# Patient Record
Sex: MALE | Race: WHITE | NOT HISPANIC OR LATINO | Employment: OTHER | URBAN - METROPOLITAN AREA
[De-identification: names, ages, dates, MRNs, and addresses within clinical notes are randomized per-mention and may not be internally consistent; named-entity substitution may affect disease eponyms.]

---

## 2016-12-28 LAB — CRC RECOMMENDATION EXT: NORMAL

## 2019-07-15 ENCOUNTER — HISTORICAL (OUTPATIENT)
Dept: RADIOLOGY | Facility: HOSPITAL | Age: 64
End: 2019-07-15

## 2019-07-19 ENCOUNTER — HISTORICAL (OUTPATIENT)
Dept: RADIOLOGY | Facility: HOSPITAL | Age: 64
End: 2019-07-19

## 2019-08-26 ENCOUNTER — HISTORICAL (OUTPATIENT)
Dept: RADIOLOGY | Facility: HOSPITAL | Age: 64
End: 2019-08-26

## 2020-02-12 LAB
ALBUMIN SERPL-MCNC: 3.9 GM/DL (ref 3.4–5)
ALBUMIN/GLOB SERPL: 1.3 {RATIO}
ALP SERPL-CCNC: 86 UNIT/L (ref 50–136)
ALT SERPL-CCNC: 66 UNIT/L (ref 12–78)
APPEARANCE, UA: CLEAR
APTT PPP: 29.6 SECOND(S) (ref 24.2–33.9)
AST SERPL-CCNC: 29 UNIT/L (ref 15–37)
BACTERIA #/AREA URNS AUTO: NORMAL /HPF
BILIRUB SERPL-MCNC: 0.8 MG/DL (ref 0.2–1)
BILIRUB UR QL STRIP: NEGATIVE
BILIRUBIN DIRECT+TOT PNL SERPL-MCNC: 0.1 MG/DL (ref 0–0.2)
BILIRUBIN DIRECT+TOT PNL SERPL-MCNC: 0.7 MG/DL (ref 0–0.8)
BUN SERPL-MCNC: 16 MG/DL (ref 7–18)
CALCIUM SERPL-MCNC: 8.8 MG/DL (ref 8.5–10.1)
CHLORIDE SERPL-SCNC: 104 MMOL/L (ref 98–107)
CO2 SERPL-SCNC: 28 MMOL/L (ref 21–32)
COLOR UR: YELLOW
CREAT SERPL-MCNC: 0.78 MG/DL (ref 0.7–1.3)
CRP SERPL HS-MCNC: 1.64 MG/L (ref 0–3)
ERYTHROCYTE [DISTWIDTH] IN BLOOD BY AUTOMATED COUNT: 12.7 % (ref 11.5–17)
ERYTHROCYTE [SEDIMENTATION RATE] IN BLOOD: 7 MM/HR (ref 0–15)
GLOBULIN SER-MCNC: 3.1 GM/DL (ref 2.4–3.5)
GLUCOSE (UA): NEGATIVE
GLUCOSE SERPL-MCNC: 110 MG/DL (ref 74–106)
HCT VFR BLD AUTO: 43.6 % (ref 42–52)
HGB BLD-MCNC: 14.3 GM/DL (ref 14–18)
HGB UR QL STRIP: NEGATIVE
INR PPP: 1 (ref 0–1.3)
KETONES UR QL STRIP: NEGATIVE
LEUKOCYTE ESTERASE UR QL STRIP: NEGATIVE
MCH RBC QN AUTO: 32.1 PG (ref 27–31)
MCHC RBC AUTO-ENTMCNC: 32.8 GM/DL (ref 33–36)
MCV RBC AUTO: 97.8 FL (ref 80–94)
NITRITE UR QL STRIP.AUTO: NEGATIVE
PH UR STRIP: 5 [PH] (ref 5–9)
PLATELET # BLD AUTO: 252 X10(3)/MCL (ref 130–400)
PMV BLD AUTO: 9.9 FL (ref 9.4–12.4)
POTASSIUM SERPL-SCNC: 4.9 MMOL/L (ref 3.5–5.1)
PREALB SERPL-MCNC: 37.4 MG/DL (ref 18–38)
PROT SERPL-MCNC: 7 GM/DL (ref 6.4–8.2)
PROT UR QL STRIP: NEGATIVE
PROTHROMBIN TIME: 12.2 SECOND(S) (ref 12–14)
RBC # BLD AUTO: 4.46 X10(6)/MCL (ref 4.7–6.1)
RBC #/AREA URNS HPF: NORMAL /[HPF]
SODIUM SERPL-SCNC: 137 MMOL/L (ref 136–145)
SP GR UR STRIP: 1.01 (ref 1–1.03)
SQUAMOUS EPITHELIAL, UA: NORMAL
TRANSFERRIN SERPL-MCNC: 279 MG/DL (ref 200–360)
UROBILINOGEN UR STRIP-ACNC: 0.2
WBC # SPEC AUTO: 7.2 X10(3)/MCL (ref 4.5–11.5)
WBC #/AREA URNS AUTO: NORMAL /HPF (ref 0–3)

## 2020-02-20 ENCOUNTER — HISTORICAL (OUTPATIENT)
Dept: ADMINISTRATIVE | Facility: HOSPITAL | Age: 65
End: 2020-02-20

## 2020-03-17 ENCOUNTER — HISTORICAL (OUTPATIENT)
Dept: RADIOLOGY | Facility: HOSPITAL | Age: 65
End: 2020-03-17

## 2020-05-22 ENCOUNTER — HISTORICAL (OUTPATIENT)
Dept: RADIOLOGY | Facility: HOSPITAL | Age: 65
End: 2020-05-22

## 2020-07-28 ENCOUNTER — HISTORICAL (OUTPATIENT)
Dept: ADMINISTRATIVE | Facility: HOSPITAL | Age: 65
End: 2020-07-28

## 2020-07-28 LAB
ABS NEUT (OLG): 4.03 X10(3)/MCL (ref 2.1–9.2)
ALBUMIN SERPL-MCNC: 4 GM/DL (ref 3.4–4.8)
ALBUMIN/GLOB SERPL: 1.3 RATIO (ref 1.1–2)
ALP SERPL-CCNC: 93 UNIT/L (ref 40–150)
ALT SERPL-CCNC: 40 UNIT/L (ref 0–55)
APPEARANCE, UA: CLEAR
AST SERPL-CCNC: 24 UNIT/L (ref 5–34)
BILIRUB SERPL-MCNC: 0.6 MG/DL (ref 0.2–1.2)
BILIRUB UR QL STRIP: NEGATIVE
BILIRUBIN DIRECT+TOT PNL SERPL-MCNC: 0.2 MG/DL (ref 0–0.5)
BILIRUBIN DIRECT+TOT PNL SERPL-MCNC: 0.4 MG/DL (ref 0–0.8)
BUN SERPL-MCNC: 15 MG/DL (ref 8.4–25.7)
CALCIUM SERPL-MCNC: 9.9 MG/DL (ref 8.8–10)
CHLORIDE SERPL-SCNC: 102 MMOL/L (ref 98–107)
CO2 SERPL-SCNC: 28 MMOL/L (ref 23–31)
COLOR UR: YELLOW
CREAT SERPL-MCNC: 0.94 MG/DL (ref 0.72–1.25)
ERYTHROCYTE [DISTWIDTH] IN BLOOD BY AUTOMATED COUNT: 12.9 % (ref 11.5–17)
EST. AVERAGE GLUCOSE BLD GHB EST-MCNC: 125.5 MG/DL
GLOBULIN SER-MCNC: 3.1 GM/DL (ref 2.4–3.5)
GLUCOSE (UA): NEGATIVE
GLUCOSE SERPL-MCNC: 124 MG/DL (ref 82–115)
HBA1C MFR BLD: 6 %
HCT VFR BLD AUTO: 43.5 % (ref 42–52)
HGB BLD-MCNC: 14.7 GM/DL (ref 14–18)
HGB UR QL STRIP: NEGATIVE
KETONES UR QL STRIP: NEGATIVE
LEUKOCYTE ESTERASE UR QL STRIP: NEGATIVE
MCH RBC QN AUTO: 32.5 PG (ref 27–31)
MCHC RBC AUTO-ENTMCNC: 33.8 GM/DL (ref 33–36)
MCV RBC AUTO: 96.2 FL (ref 80–94)
MRSA SCREEN BY PCR: NEGATIVE
NITRITE UR QL STRIP: NEGATIVE
NRBC BLD AUTO-RTO: 0 % (ref 0–0.2)
PH UR STRIP: 5 [PH] (ref 5–7)
PLATELET # BLD AUTO: 264 X10(3)/MCL (ref 130–400)
PMV BLD AUTO: 9.6 FL (ref 7.4–10.4)
POTASSIUM SERPL-SCNC: 4.3 MMOL/L (ref 3.5–5.1)
PROT SERPL-MCNC: 7.1 GM/DL (ref 5.8–7.6)
PROT UR QL STRIP: NEGATIVE
RBC # BLD AUTO: 4.52 X10(6)/MCL (ref 4.7–6.1)
SODIUM SERPL-SCNC: 139 MMOL/L (ref 136–145)
SP GR UR STRIP: 1.02 (ref 1–1.03)
UROBILINOGEN UR STRIP-ACNC: NEGATIVE
WBC # SPEC AUTO: 7.4 X10(3)/MCL (ref 4.5–11.5)

## 2020-10-22 ENCOUNTER — HISTORICAL (OUTPATIENT)
Dept: ADMINISTRATIVE | Facility: HOSPITAL | Age: 65
End: 2020-10-22

## 2020-11-17 ENCOUNTER — HISTORICAL (OUTPATIENT)
Dept: ADMINISTRATIVE | Facility: HOSPITAL | Age: 65
End: 2020-11-17

## 2020-12-15 ENCOUNTER — HISTORICAL (OUTPATIENT)
Dept: ADMINISTRATIVE | Facility: HOSPITAL | Age: 65
End: 2020-12-15

## 2021-11-18 ENCOUNTER — HISTORICAL (OUTPATIENT)
Dept: ADMINISTRATIVE | Facility: HOSPITAL | Age: 66
End: 2021-11-18

## 2022-04-10 ENCOUNTER — HISTORICAL (OUTPATIENT)
Dept: ADMINISTRATIVE | Facility: HOSPITAL | Age: 67
End: 2022-04-10

## 2022-04-28 VITALS
DIASTOLIC BLOOD PRESSURE: 72 MMHG | WEIGHT: 205 LBS | BODY MASS INDEX: 27.77 KG/M2 | HEIGHT: 72 IN | SYSTOLIC BLOOD PRESSURE: 119 MMHG

## 2022-05-04 NOTE — HISTORICAL [facility]
This is a historical note converted from Leandra. Formatting and pictures may have been removed.  Please reference Leandra for original formatting and attached multimedia. Chief Complaint  2 wk post op LT STEPAN 11/2/20, gl 1/31/21  History of Present Illness  65-year-old male presents here follow-up total of the left side. ?Patient is 2 weeks out. ?Doing very well. ?Using a walker for ambulation. ?Pain is controlled. ?Happy to recovery thus far.  Review of Systems  Denies fevers, chills, chest pain, shortness of breath. Comprehensive review of systems performed and otherwise negative except as noted in HPI.  Physical Exam  Vitals & Measurements  T:?36.1? ?C (Temporal Artery)? HR:?80(Peripheral)? BP:?112/68?  HT:?182.88?cm? WT:?93.000?kg? BMI:?27.81?  General: No acute distress, alert and oriented, healthy appearing?  HEENT: Head is atraumatic, mucous membranes are moist?  Cardiovascular: Brisk capillary refill  Lungs: Breathing non-labored?  Skin: no rashes appreciated?  Neurologic: Sensation is grossly intact distally  ?  Hip exam:  Left?hip incision clean dry and intact. No erythema, drainage, or signs of infection  No swelling distally. No signs of DVT  No pain with logroll  Sensation intact distally to right foot  Positive FHL/EHL/gastrocsoleus/tib ant brisk capillary refill right foot  ?  Assessment/Plan  Aftercare following joint replacement?Z47.1  ?Patient doing excellent following hip arthroplasty. ?His pain is controlled. ?He is ambulating well.? Happy with his recovery thus far.? Plan to see him back in a month. ?Continue physical therapy for gait training.  Ordered:  Clinic Follow up, *Est. 12/15/20 3:00:00 CST, Order for future visit, Aftercare following joint replacement, [facility]  Post-Op follow-up visit [id number] PC, Aftercare following joint replacement, [facility], 11/17/20 15:48:00 CST  XR Hip Left 2 Views, Routine, *Est. 12/15/20 3:00:00 CST, Arthritis, None, Wheelchair, Patient Has IV?, Rad  Type, Order for future visit, Aftercare following joint replacement, Not Scheduled, *Est. 12/15/20 3:00:00 CST  ?  Orders:  Sugical technique, use of robotics PC, 11/02/20 15:55:00 CST, [facility], Routine, 11/02/20 15:55:00 CST, Osteoarthritis of left hip  Total hip arthroplasty [id number] PC, 11/02/20 15:55:00 CST, LT, [facility], Routine, 11/02/20 15:55:00 CST, Osteoarthritis of left hip  Referrals  Clinic Follow up, *Est. 12/15/20 3:00:00 CST, Order for future visit, Aftercare following joint replacement, [facility]   Problem List/Past Medical History  Ongoing  Back pain  Back pain  Cardiomegaly  Congestive heart failure  Constipation  Diverticulosis  Hypertension  Internal disruption of lumbar intervertebral disc  Lumbar radiculopathy  Neuropathy of upper limb  Sleep apnea  Historical  Acute peptic ulcer of stomach  Chest discomfort  Laxative abuse  Pneumonia  Procedure/Surgical History  AUGUSTO COLLADO Total Hip Arthroplasty (Left) (11/02/2020)  Replacement of Left Hip Joint with Metal on Polyethylene Synthetic Substitute, Open Approach (11/02/2020)  Excision of Lumbar Vertebral Disc, Open Approach (02/20/2020)  Laminectomy Lumbar MIS (.) (02/20/2020)  Laminotomy (hemilaminectomy), with decompression of nerve root(s), including partial facetectomy, foraminotomy and/or excision of herniated intervertebral disc; 1 interspace, lumbar (02/20/2020)  Laminotomy (hemilaminectomy), with decompression of nerve root(s), including partial facetectomy, foraminotomy and/or excision of herniated intervertebral disc; each additional interspace, cervical or lumbar (List separately in addition to code for primar (02/20/2020)  Release Lumbar Nerve, Open Approach (02/20/2020)  Cystoscopy/Retrograde Pyelogram (Left) (07/02/2019)  Drainage of Bladder, Via Natural or Artificial Opening Endoscopic (07/02/2019)  Fluoroscopy of Left Kidney, Ureter and Bladder using Low Osmolar Contrast (07/02/2019)  Colonoscopy  (12/28/2016)  Colonoscopy, flexible; diagnostic, including collection of specimen(s) by brushing or washing, when performed (separate procedure) (12/28/2016)  Inspection of Lower Intestinal Tract, Via Natural or Artificial Opening Endoscopic (12/28/2016)  Esophagogastroduodenoscopy (05/25/2016)  Esophagogastroduodenoscopy, flexible, transoral; with biopsy, single or multiple (05/24/2016)  Excision of Duodenum, Via Natural or Artificial Opening Endoscopic (05/24/2016)  Colonoscopy (2004)  Right knee surgery (1987)  Bilateral inguinal hernia surgery  Repair of facial bone   Medications  Aldactone 25 mg oral tablet, 12.5 mg= 0.5 tab(s), Oral, Daily  aspirin 81 mg oral Delayed Release (EC) tablet, 81 mg= 1 tab(s), Oral, BID  aspirin 81 mg oral tablet, CHEWABLE, 81 mg= 1 tab(s), Chewed, Daily  atorvastatin 40 mg oral tablet, 40 mg= 1 tab(s), Oral, Daily  gabapentin 100 mg oral capsule, 200 mg= 2 cap(s), Oral, BID  lisinopril 2.5 mg oral tablet, 2.5 mg= 1 tab(s), Oral, Daily  meloxicam 7.5 mg oral tablet, 7.5 mg= 1 tab(s), Oral, Daily,? ?Not taking: Last Dose Date/Time Unknown  Nitrostat 0.4 mg sublingual tab, 0.4 mg= 1 tab(s), SL, q5min, PRN  polyethylene glycol 3350 oral powder for reconstitution, Oral, Daily  Toprol-XL 25 mg oral tablet, extended release, 25 mg= 1 tab(s), Oral, Daily  Tylenol, 500 mg= 1 tab(s), Oral, q4hr  Allergies  No Known Allergies  Social History  Abuse/Neglect  No, 11/17/2020  Alcohol  Current, Daily, 11/17/2020  Employment/School  Employed, Work/School description: [employer]. Highest education level: Some college., 06/06/2016  Exercise  Home/Environment  Lives with Spouse. Alcohol abuse in household: No. Substance abuse in household: No. Smoker in household: Yes. Injuries/Abuse/Neglect in household: No. Feels unsafe at home: No. Safe place to go: Yes. Agency(s)/Others notified: No. Family/Friends available for support: Yes. Concern for family members at home: No. Major illness in household: No.  Financial concerns: No. TV/Computer concerns: No., 06/06/2016  Nutrition/Health  Regular, 06/06/2016  Sexual  Sexually active: Yes. History of sexual abuse: No., 06/06/2016  Spiritual/Cultural  Spiritism, Yes, 02/12/2020  Substance Use  Past, 11/17/2020  Tobacco  Former smoker, quit more than 30 days ago, N/A, 11/17/2020  Family History  Alzheimers disease: Father.  Diabetes mellitus type 2: Mother.  Hyperlipidemia.: Mother.  Hypertension.: Mother.  Primary malignant neoplasm of breast: Mother.  Health Maintenance  Health Maintenance  ???Pending?(in the next year)  ??? ??OverDue  ??? ? ? ?HF-ACEI/ARB Prescribed if Clinically Indicated due??12/27/17??and every 1??year(s)  ??? ? ? ?Cognitive Screening due??01/02/20??and every 1??year(s)  ??? ??Due?  ??? ? ? ?Influenza Vaccine due??10/01/20??and every 1??day(s)  ??? ? ? ?ADL Screening due??11/17/20??and every 1??year(s)  ??? ? ? ?HF-Ejection Fraction Past 13 Months if Hospitalized due??11/17/20??and every 1??year(s)  ??? ? ? ?HF-Fluid Restriction/Low Sodium Diet Education due??11/17/20??and every 1??year(s)  ??? ? ? ?HF-LVEF due??11/17/20??Unknown Frequency  ??? ? ? ?Hypertension Management-Education due??11/17/20??and every 1??year(s)  ??? ? ? ?Medicare Annual Wellness Exam due??11/17/20??and every 1??year(s)  ??? ? ? ?Pneumococcal Vaccine due??11/17/20??Unknown Frequency  ??? ? ? ?Tetanus Vaccine due??11/17/20??and every 10??year(s)  ??? ? ? ?Zoster Vaccine due??11/17/20??Unknown Frequency  ??? ??Due In Future?  ??? ? ? ?Obesity Screening not due until??01/01/21??and every 1??year(s)  ??? ? ? ?Advance Directive not due until??01/02/21??and every 1??year(s)  ??? ? ? ?Alcohol Misuse Screening not due until??01/02/21??and every 1??year(s)  ??? ? ? ?Fall Risk Assessment not due until??01/02/21??and every 1??year(s)  ??? ? ? ?Functional Assessment not due until??01/02/21??and every 1??year(s)  ??? ? ? ?Blood Pressure Screening not due until??10/22/21??and every  1??year(s)  ??? ? ? ?Body Mass Index Check not due until??10/22/21??and every 1??year(s)  ??? ? ? ?Depression Screening not due until??10/22/21??and every 1??year(s)  ??? ? ? ?Hypertension Management-Blood Pressure not due until??10/22/21??and every 1??year(s)  ??? ? ? ?Diabetes Screening not due until??11/03/21??and every 1??year(s)  ??? ? ? ?HF-Heart Failure Education not due until??11/03/21??and every 1??year(s)  ??? ? ? ?Hypertension Management-BMP not due until??11/03/21??and every 1??year(s)  ???Satisfied?(in the past 1 year)  ??? ??Satisfied?  ??? ? ? ?Advance Directive on??11/02/20.??Satisfied by Harris Toribio RN  ??? ? ? ?Alcohol Misuse Screening on??07/14/20.??Satisfied by Karey Meyers  ??? ? ? ?Aspirin Therapy for CVD Prevention on??12/17/20.??Satisfied by Bria Negro NP  ??? ? ? ?Blood Pressure Screening on??11/17/20.??Satisfied by Lulu Walker  ??? ? ? ?Body Mass Index Check on??11/17/20.??Satisfied by Lulu Walker  ??? ? ? ?Coronary Artery Disease Maintenance-Antiplatelet Agent Prescribed on??12/17/20.??Satisfied by Bria Negro NP  ??? ? ? ?Depression Screening on??11/17/20.??Satisfied by Lulu Walker  ??? ? ? ?Diabetes Screening on??11/03/20.??Satisfied by Micky Palma  ??? ? ? ?Fall Risk Assessment on??11/17/20.??Satisfied by Lulu Walker  ??? ? ? ?Functional Assessment on??11/03/20.??Satisfied by Minerva Stevens RN  ??? ? ? ?Hypertension Management-Blood Pressure on??11/17/20.??Satisfied by Lulu Walker  ??? ? ? ?Hypertension Management-BMP on??11/03/20.??Satisfied by Micky Palma  ??? ? ? ?Influenza Vaccine on??11/02/20.??Satisfied by Catarino ALSTON, Harris QUINTANA  ??? ? ? ?Obesity Screening on??11/17/20.??Satisfied by Lulu Walker  ?  Diagnostic Results  AP lateral hip reviewed. ?Patients implants appear well fixed with no signs of loosening or subsidence noted.

## 2022-05-04 NOTE — HISTORICAL [facility]
This is a historical note converted from Leandra. Formatting and pictures may have been removed.  Please reference [facility] for original formatting and attached multimedia. Chief Complaint  PRE-OP TODAY FOR LEFT STEPAN SX ON 11/2/20, HAS CARDIAC CLEARANCE.  History of Present Illness  65-year-old patient follow-up?osteoarthritis of the hip.? Patient with history of?surgery scheduled prior to this however?was canceled due to cardiac issues.? He has been cleared.? Continues to have significant complaints of pain to the left hip. ?He has tried activity modification, anti-inflammatories. ?Despite all this he continues to have significant complaints of pain in?disability. ?He would like to proceed with total hip arthroplasty in the left side.  Review of Systems  Denies fevers, chills, chest pain, shortness of breath. Comprehensive review of systems performed and otherwise negative except as noted in HPI.  Physical Exam  Vitals & Measurements  T:?97.9? ?F (Oral)? BP:?128/72?  HT:?182.86?cm? WT:?91.100?kg? BMI:?27.24?  General: No acute distress, alert and oriented, healthy appearing?  HEENT: Head is atraumatic, mucous membranes are moist  Cardiovascular: Brisk capillary refill  Lungs: Breathing non-labored  Skin: no rashes appreciated  Neurologic: Sensation is grossly intact distally  ?  Left hip:  Brisk cap refill distally. ?Sensation intact to his foot. ?Pain with range of motion to left hip including internal and external rotation. ?He has a positive [place]inchfield.  Assessment/Plan  1.?Primary osteoarthritis of left hip?M16.12  At this point the patient is tried and failed all conservative management with regards to their left hip. ?They have tried and failed nonoperative management including: Anti-inflammatories, activity modification, and assistive devices. All of these have failed to completely remove their pain. They have pain putting on shoes and socks, getting in and out of a car, as well as walking on level ground.  Their hip pain is affecting activities of daily living They feel that theyve reached a point of disability with regards to their hip. X-rays reveal advanced, end-stage degenerative osteoarthritis as noted by subchondral sclerosis, joint space narrowing, and periarticular osteophytes. The patient would like to proceed with surgical intervention and would be a good candidate for total hip replacement.  Total hip arthroplasty procedure, alternatives, risks, and benefits were discussed in detail. The risks including but not limited to: infection, need for revision surgery, pain, swelling, loosening, injury to surrounding neurovascular structures, stiffness, incomplete resolution of pain, limb length discrepancy, DVT, PE, and death were discussed in detail. Despite these risks, the patient would like to proceed with surgical intervention. All questions were answered, no guarantees made. Will plan for left STEPAN on 11/2/2020.  Ordered:  Office/Outpatient Visit Level 3 Established [id number] PC, Primary osteoarthritis of left hip, [facility], 10/22/20 9:26:00 CDT  Office/Outpatient Visit Level 3 Established [id number] PC, Primary osteoarthritis of left hip, [facility], 10/22/20 9:17:00 CDT  XR Spine Lumbar 2 or 3 Views, Routine, 10/22/20 9:26:00 CDT, None, Wheelchair, Patient Has IV?, Sitting/Standing Lateral only, Rad Type, Primary osteoarthritis of left hip, Not Scheduled, 10/22/20 9:26:00 CDT  ?   Problem List/Past Medical History  Ongoing  Back pain  Back pain  Cardiomegaly  Congestive heart failure  Constipation  Diverticulosis  Hypertension  Internal disruption of lumbar intervertebral disc  Lumbar radiculopathy  Neuropathy of upper limb  Sleep apnea  Historical  Acute peptic ulcer of stomach  Chest discomfort  Laxative abuse  Pneumonia  Procedure/Surgical History  Excision of Lumbar Vertebral Disc, Open Approach (02/20/2020)  Laminectomy Lumbar MIS (.) (02/20/2020)  Laminotomy (hemilaminectomy), with  decompression of nerve root(s), including partial facetectomy, foraminotomy and/or excision of herniated intervertebral disc; 1 interspace, lumbar (02/20/2020)  Laminotomy (hemilaminectomy), with decompression of nerve root(s), including partial facetectomy, foraminotomy and/or excision of herniated intervertebral disc; each additional interspace, cervical or lumbar (List separately in addition to code for primar (02/20/2020)  Release Lumbar Nerve, Open Approach (02/20/2020)  Cystoscopy/Retrograde Pyelogram (Left) (07/02/2019)  Drainage of Bladder, Via Natural or Artificial Opening Endoscopic (07/02/2019)  Fluoroscopy of Left Kidney, Ureter and Bladder using Low Osmolar Contrast (07/02/2019)  Colonoscopy (12/28/2016)  Colonoscopy, flexible; diagnostic, including collection of specimen(s) by brushing or washing, when performed (separate procedure) (12/28/2016)  Inspection of Lower Intestinal Tract, Via Natural or Artificial Opening Endoscopic (12/28/2016)  Esophagogastroduodenoscopy (05/25/2016)  Esophagogastroduodenoscopy, flexible, transoral; with biopsy, single or multiple (05/24/2016)  Excision of Duodenum, Via Natural or Artificial Opening Endoscopic (05/24/2016)  Colonoscopy (2004)  Right knee surgery (1987)  Bilateral inguinal hernia surgery  Repair of facial bone   Medications  acetaminophen-hydrocodone 325 mg-10 mg oral tablet, 1 tab(s), Oral, QID,? ?Not taking: prn Last Dose Date/Time Unknown  Aldactone 25 mg oral tablet, 12.5 mg= 0.5 tab(s), Oral, Daily  Aspir 81 oral delayed release tablet, 81 mg= 1 tab(s), Oral, Daily  atorvastatin 40 mg oral tablet, 40 mg= 1 tab(s), Oral, Daily  cyclobenzaprine 10 mg oral tablet, 10 mg= 1 tab(s), Oral, TID,? ?Not taking: Last Dose Date/Time Unknown  gabapentin 100 mg oral capsule, 200 mg= 2 cap(s), Oral, TID,? ?Still taking, not as prescribed: Takes two tabs BID  Lipitor 10 mg oral tablet, 10 mg= 1 tab(s), Oral, Daily,? ?Not taking: Last Dose Date/Time  Unknown  lisinopril 2.5 mg oral tablet, 2.5 mg= 1 tab(s), Oral, Daily  meloxicam 7.5 mg oral tablet, 7.5 mg= 1 tab(s), Oral, Daily,? ?Not taking: Last Dose Date/Time Unknown  Nitrostat 0.4 mg sublingual tab, 0.4 mg= 1 tab(s), SL, q5min, PRN  prednisONE 20 mg oral tablet, 20 mg= 1 tab(s), Oral, Daily,? ?Not taking: Last Dose Date/Time Unknown  Toprol-XL 25 mg oral tablet, extended release, 25 mg= 1 tab(s), Oral, Daily  Allergies  No Known Allergies  Social History  Abuse/Neglect  No, No, Yes, 10/22/2020  Alcohol  Current, Beer, Daily, 6 drinks/episode average., 06/06/2016  Employment/School  Employed, Work/School description: [employer]. Highest education level: Some college., 06/06/2016  Exercise  Home/Environment  Lives with Spouse. Alcohol abuse in household: No. Substance abuse in household: No. Smoker in household: Yes. Injuries/Abuse/Neglect in household: No. Feels unsafe at home: No. Safe place to go: Yes. Agency(s)/Others notified: No. Family/Friends available for support: Yes. Concern for family members at home: No. Major illness in household: No. Financial concerns: No. TV/Computer concerns: No., 06/06/2016  Nutrition/Health  Regular, 06/06/2016  Sexual  Sexually active: Yes. History of sexual abuse: No., 06/06/2016  Spiritual/Cultural  Faith, Yes, 02/12/2020  Substance Use  Past, 06/06/2016  Tobacco  Former smoker, quit more than 30 days ago, Cigarettes, N/A, 10 per day. Household tobacco concerns: No. 27 Years (Age stopped)., 10/22/2020  Family History  Alzheimers disease: Father.  Diabetes mellitus type 2: Mother.  Hyperlipidemia.: Mother.  Hypertension.: Mother.  Primary malignant neoplasm of breast: Mother.  Health Maintenance  Health Maintenance  ???Pending?(in the next year)  ??? ??OverDue  ??? ? ? ?Coronary Artery Disease Maintenance-Antiplatelet Agent Prescribed due??and every?  ??? ? ? ?Depression Screening due??08/17/17??and every 1??year(s)  ??? ? ? ?HF-ACEI/ARB Prescribed if Clinically  Indicated due??12/27/17??and every 1??year(s)  ??? ? ? ?Influenza Vaccine due??10/01/19??and every 1??day(s)  ??? ? ? ?Advance Directive due??01/02/20??and every 1??year(s)  ??? ? ? ?Cognitive Screening due??01/02/20??and every 1??year(s)  ??? ? ? ?Aspirin Therapy for CVD Prevention due??07/02/20??and every 1??year(s)  ??? ??Due?  ??? ? ? ?ADL Screening due??10/22/20??and every 1??year(s)  ??? ? ? ?HF-Ejection Fraction Past 13 Months if Hospitalized due??10/22/20??and every 1??year(s)  ??? ? ? ?HF-Fluid Restriction/Low Sodium Diet Education due??10/22/20??and every 1??year(s)  ??? ? ? ?HF-LVEF due??10/22/20??and every?  ??? ? ? ?Hypertension Management-Education due??10/22/20??and every 1??year(s)  ??? ? ? ?Medicare Annual Wellness Exam due??10/22/20??and every 1??year(s)  ??? ? ? ?Pneumococcal Vaccine due??10/22/20??and every?  ??? ? ? ?Tetanus Vaccine due??10/22/20??and every 10??year(s)  ??? ? ? ?Zoster Vaccine due??10/22/20??and every?  ??? ??Due In Future?  ??? ? ? ?Obesity Screening not due until??01/01/21??and every 1??year(s)  ??? ? ? ?Alcohol Misuse Screening not due until??01/02/21??and every 1??year(s)  ??? ? ? ?Fall Risk Assessment not due until??01/02/21??and every 1??year(s)  ??? ? ? ?Functional Assessment not due until??01/02/21??and every 1??year(s)  ??? ? ? ?HF-Heart Failure Education not due until??02/11/21??and every 1??year(s)  ??? ? ? ?Diabetes Screening not due until??10/03/21??and every 1??year(s)  ??? ? ? ?Hypertension Management-BMP not due until??10/03/21??and every 1??year(s)  ???Satisfied?(in the past 1 year)  ??? ??Satisfied?  ??? ? ? ?Alcohol Misuse Screening on??07/14/20.??Satisfied by Karey Meyers  ??? ? ? ?Blood Pressure Screening on??10/22/20.??Satisfied by Milly Rios  ??? ? ? ?Body Mass Index Check on??10/22/20.??Satisfied by Milly Rios  ??? ? ? ?Depression Screening on??10/22/20.??Satisfied by Milly Rios  ??? ? ? ?Diabetes Screening on??10/03/20.??Satisfied by Rodrigo  Davidson OSULLIVAN  ??? ? ? ?Fall Risk Assessment on??10/22/20.??Satisfied by Milly Rios  ??? ? ? ?Functional Assessment on??02/20/20.??Satisfied by Luis ALSTON, Roopa MARQUEZ.  ??? ? ? ?Hypertension Management-Blood Pressure on??10/22/20.??Satisfied by Milly Rios  ??? ? ? ?Obesity Screening on??10/22/20.??Satisfied by Milly Rios  ?  Diagnostic Results  AP lateral hip reviewed. ?Patient with end-stage arthritis with loss of joint space and bone-on-bone articulation.      The patient has a history of CHF and Cardiomegaly and is at higher risk of adverse effects of surgery and anesthesia. MD expect patient to stay 2 midnights in the hospital. Will admit the patient as inpatient for closer monitoring of fluid intake and output, vital signs, neurological assessments and will use telemetry if needed. Plan for discharge once the patient is stable and at baseline.?  In addition, The patient has a history of Sleep apnea and is at higher risk of adverse effects of surgery and anesthesia. MD expect patient to stay 2 midnights in the hospital. Will admit the patient as inpatient for Oxygen Therapy, closer monitoring of oxygenation, respiratory status, Incentive spirometry and Nebs PRN. Plan for discharge once the patient is oxygenating well, stable and at baseline. ?

## 2022-05-04 NOTE — HISTORICAL [facility]
This is a historical note converted from Leandra. Formatting and pictures may have been removed.  Please reference Leandra for original formatting and attached multimedia. Chief Complaint  1 MONTH F/U LEFT STEPAN ON 11/2/20. ?DOING GREAT. ?IN PHYSICAL THERAPY DOING THE QUATS HE FEELS PRESSURE AND HE STOPS.  History of Present Illness  Six 5-year-old female presents today for follow-up of total hip on the?left side. ?6 weeks out. ?Doing very well. ?Not on any pain medication.? Happy to recovery. ?Denies any complaints of pain today. ?Using no assistive devices.  Review of Systems  Denies fevers, chills, chest pain, shortness of breath. Comprehensive review of systems performed and otherwise negative except as noted in HPI.  Physical Exam  Vitals & Measurements  T:?99.1? ?F (Oral)? BP:?111/64?  HT:?182.88?cm? WT:?93.000?kg? BMI:?27.81?  General: No acute distress, alert and oriented, healthy appearing?  HEENT: Head is atraumatic, mucous membranes are moist?  Cardiovascular: Brisk capillary refill  Lungs: Breathing non-labored?  Skin: no rashes appreciated?  Neurologic: Sensation is grossly intact distally  ?  Hip exam:  Left?hip incision clean dry and intact. No erythema, drainage, or signs of infection  No swelling distally. No signs of DVT  No pain with logroll  Sensation intact distally to right foot  Positive FHL/EHL/gastrocsoleus/tib ant brisk capillary refill right foot  ?  Assessment/Plan  1.?Aftercare following joint replacement?Z47.1  ?Patient doing excellent following up arthroplasty. ?Very satisfied his recovery. ?Plan to see him back in 2 months?or sooner with any issues.? We did discuss the current recommendations regarding antibiotic prophylaxis prior to any dental work or colonoscopies. Patient is aware of this and will contact her office should they need any prescriptions for antibiotics called in.  Ordered:  Clinic Follow up, *Est. 02/15/21 3:00:00 CST, Order for future visit, Aftercare following joint  replacement, [facility]  Post-Op follow-up visit [id number], Aftercare following joint replacement, [facility], 12/15/20 10:24:00 CST  ?  Referrals  Clinic Follow up, *Est. 02/18/21 10:15:00 CST, Order for future visit, Aftercare following joint replacement, [facility]   Problem List/Past Medical History  Ongoing  Back pain  Back pain  Cardiomegaly  Congestive heart failure  Constipation  Diverticulosis  Hypertension  Internal disruption of lumbar intervertebral disc  Lumbar radiculopathy  Neuropathy of upper limb  Sleep apnea  Historical  Acute peptic ulcer of stomach  Chest discomfort  Laxative abuse  Pneumonia  Procedure/Surgical History  AUGUSTO COLLADO Total Hip Arthroplasty (Left) (11/02/2020)  Replacement of Left Hip Joint with Metal on Polyethylene Synthetic Substitute, Open Approach (11/02/2020)  Excision of Lumbar Vertebral Disc, Open Approach (02/20/2020)  Laminectomy Lumbar MIS (.) (02/20/2020)  Laminotomy (hemilaminectomy), with decompression of nerve root(s), including partial facetectomy, foraminotomy and/or excision of herniated intervertebral disc; 1 interspace, lumbar (02/20/2020)  Laminotomy (hemilaminectomy), with decompression of nerve root(s), including partial facetectomy, foraminotomy and/or excision of herniated intervertebral disc; each additional interspace, cervical or lumbar (List separately in addition to code for primar (02/20/2020)  Release Lumbar Nerve, Open Approach (02/20/2020)  Cystoscopy/Retrograde Pyelogram (Left) (07/02/2019)  Drainage of Bladder, Via Natural or Artificial Opening Endoscopic (07/02/2019)  Fluoroscopy of Left Kidney, Ureter and Bladder using Low Osmolar Contrast (07/02/2019)  Colonoscopy (12/28/2016)  Colonoscopy, flexible; diagnostic, including collection of specimen(s) by brushing or washing, when performed (separate procedure) (12/28/2016)  Inspection of Lower Intestinal Tract, Via Natural or Artificial Opening Endoscopic  (12/28/2016)  Esophagogastroduodenoscopy (05/25/2016)  Esophagogastroduodenoscopy, flexible, transoral; with biopsy, single or multiple (05/24/2016)  Excision of Duodenum, Via Natural or Artificial Opening Endoscopic (05/24/2016)  Colonoscopy (2004)  Right knee surgery (1987)  Bilateral inguinal hernia surgery  Repair of facial bone   Medications  Aldactone 25 mg oral tablet, 12.5 mg= 0.5 tab(s), Oral, Daily  aspirin 81 mg oral Delayed Release (EC) tablet, 81 mg= 1 tab(s), Oral, BID  aspirin 81 mg oral tablet, CHEWABLE, 81 mg= 1 tab(s), Chewed, Daily,? ?Not taking  atorvastatin 40 mg oral tablet, 40 mg= 1 tab(s), Oral, Daily  gabapentin 100 mg oral capsule, 200 mg= 2 cap(s), Oral, BID  lisinopril 2.5 mg oral tablet, 2.5 mg= 1 tab(s), Oral, Daily  meloxicam 7.5 mg oral tablet, 7.5 mg= 1 tab(s), Oral, Daily,? ?Not taking: Last Dose Date/Time Unknown  Nitrostat 0.4 mg sublingual tab, 0.4 mg= 1 tab(s), SL, q5min, PRN  polyethylene glycol 3350 oral powder for reconstitution, Oral, Daily  Toprol-XL 25 mg oral tablet, extended release, 25 mg= 1 tab(s), Oral, Daily  Tylenol, 500 mg= 1 tab(s), Oral, q4hr  Allergies  No Known Allergies  Social History  Abuse/Neglect  No, 12/15/2020  Alcohol  Current, Daily, 11/17/2020  Employment/School  Employed, Work/School description: [employer]. Highest education level: Some college., 06/06/2016  Exercise  Home/Environment  Lives with Spouse. Alcohol abuse in household: No. Substance abuse in household: No. Smoker in household: Yes. Injuries/Abuse/Neglect in household: No. Feels unsafe at home: No. Safe place to go: Yes. Agency(s)/Others notified: No. Family/Friends available for support: Yes. Concern for family members at home: No. Major illness in household: No. Financial concerns: No. TV/Computer concerns: No., 06/06/2016  Nutrition/Health  Regular, 06/06/2016  Sexual  Sexually active: Yes. History of sexual abuse: No., 06/06/2016  Spiritual/Cultural  Jew, Yes,  02/12/2020  Substance Use  Past, 11/17/2020  Tobacco  Former smoker, quit more than 30 days ago, N/A, 12/15/2020  Family History  Alzheimers disease: Father.  Diabetes mellitus type 2: Mother.  Hyperlipidemia.: Mother.  Hypertension.: Mother.  Primary malignant neoplasm of breast: Mother.  Health Maintenance  Health Maintenance  ???Pending?(in the next year)  ??? ??OverDue  ??? ? ? ?HF-ACEI/ARB Prescribed if Clinically Indicated due??12/27/17??and every 1??year(s)  ??? ? ? ?Cognitive Screening due??01/02/20??and every 1??year(s)  ??? ? ? ?Influenza Vaccine due??10/01/20??and every 1??day(s)  ??? ??Due?  ??? ? ? ?ADL Screening due??12/15/20??and every 1??year(s)  ??? ? ? ?HF-Ejection Fraction Past 13 Months if Hospitalized due??12/15/20??and every 1??year(s)  ??? ? ? ?HF-Fluid Restriction/Low Sodium Diet Education due??12/15/20??and every 1??year(s)  ??? ? ? ?HF-LVEF due??12/15/20??Unknown Frequency  ??? ? ? ?Hypertension Management-Education due??12/15/20??and every 1??year(s)  ??? ? ? ?Medicare Annual Wellness Exam due??12/15/20??and every 1??year(s)  ??? ? ? ?Pneumococcal Vaccine due??12/15/20??Unknown Frequency  ??? ? ? ?Tetanus Vaccine due??12/15/20??and every 10??year(s)  ??? ? ? ?Zoster Vaccine due??12/15/20??Unknown Frequency  ??? ??Due In Future?  ??? ? ? ?Obesity Screening not due until??01/01/21??and every 1??year(s)  ??? ? ? ?Advance Directive not due until??01/02/21??and every 1??year(s)  ??? ? ? ?Alcohol Misuse Screening not due until??01/02/21??and every 1??year(s)  ??? ? ? ?Fall Risk Assessment not due until??01/02/21??and every 1??year(s)  ??? ? ? ?Functional Assessment not due until??01/02/21??and every 1??year(s)  ??? ? ? ?Diabetes Screening not due until??11/03/21??and every 1??year(s)  ??? ? ? ?HF-Heart Failure Education not due until??11/03/21??and every 1??year(s)  ??? ? ? ?Hypertension Management-BMP not due until??11/03/21??and every 1??year(s)  ??? ? ? ?Depression Screening not due  until??11/17/21??and every 1??year(s)  ???Satisfied?(in the past 1 year)  ??? ??Satisfied?  ??? ? ? ?Advance Directive on??11/02/20.??Satisfied by Hraris Toribio RN.  ??? ? ? ?Alcohol Misuse Screening on??07/14/20.??Satisfied by Karey Meyers  ??? ? ? ?Aspirin Therapy for CVD Prevention on??12/17/20.??Satisfied by Bria Negro NP  ??? ? ? ?Blood Pressure Screening on??12/15/20.??Satisfied by Karey Meyers  ??? ? ? ?Body Mass Index Check on??12/15/20.??Satisfied by Karey Meyers  ??? ? ? ?Coronary Artery Disease Maintenance-Antiplatelet Agent Prescribed on??12/17/20.??Satisfied by Bria Negro NP  ??? ? ? ?Depression Screening on??11/17/20.??Satisfied by Lulu Walker.  ??? ? ? ?Diabetes Screening on??11/03/20.??Satisfied by Micky Palma.  ??? ? ? ?Fall Risk Assessment on??12/15/20.??Satisfied by Karey Meyers  ??? ? ? ?Functional Assessment on??11/03/20.??Satisfied by Minerva Stevens RN  ??? ? ? ?Hypertension Management-Blood Pressure on??12/15/20.??Satisfied by Karey Meyers  ??? ? ? ?Hypertension Management-BMP on??11/03/20.??Satisfied by Micky Palma.  ??? ? ? ?Influenza Vaccine on??12/15/20.??Satisfied by Karey Meyers  ??? ? ? ?Obesity Screening on??12/15/20.??Satisfied by Karey Meyers  ?  Diagnostic Results  AP lateral hip reviewed. ?Patients implants appear well fixed with no signs of loosening or subsidence noted.

## 2022-05-04 NOTE — HISTORICAL [facility]
This is a historical note converted from Leandra. Formatting and pictures may have been removed.  Please reference Leandra for original formatting and attached multimedia. Chief Complaint  pt here for 9 MONTH F/U LEFT STEPAN SX ON 11/2/20, he reports mild stiffness in the mornings but otherwise feels great. no complaints.  History of Present Illness  66-year-old male presented to the clinic today 1 year status post left total hip arthroplasty. ?Overall the patient is doing extremely well. ?He has no complaints today here in clinic.? He denies any pain. ?He ambulates without any assistance.  Review of Systems  Denies fevers, chills, chest pain, shortness of breath. Comprehensive review of systems performed and otherwise negative except as noted in HPI  Physical Exam  Vitals & Measurements  T:?37? ?C (Oral)? HR:?60(Peripheral)? BP:?119/72?  HT:?182.88?cm? WT:?93.000?kg? BMI:?27.81?  General: awake and alert, no acute distress, healthy appearing  ?Head and Neck: Head atraumatic/normocephalic. Moist MM  ?CV: brisk cap refill  ?Lungs: non-labored breathing, w/o cough or SOB  ?Skin: no rashes present, warm to touch  ?Neuro: sensation grossly intact distally  ?  Left hip exam:  Left hip incision clean dry and intact. No erythema, drainage, or signs of infection  ?No swelling distally. No signs of DVT  Brisk cap refill right ?foot  ?Sensation intact distally to right foot  ?Positive FHL/EHL/gastrocsoleus/tib ant  Assessment/Plan  1.?Aftercare following left hip joint replacement surgery?Z47.1  ?Patient presents to clinic today 1 year status post left total hip arthroplasty. ?Overall the patient is doing very well. ?Upon examination and x-rays today here in clinic his hip is stable.? We did discuss the current recommendations regarding antibiotic prophylaxis prior to any dental work or colonoscopies. Patient is aware of this and will contact her office should they need any prescriptions for antibiotics called in.? Patient states  understanding.? We will have him follow-up in about 3 to 5 years with x-rays?to reassess his hip at this time.  The above findings, diagnostics, and treatment plan were discussed with Dr. Fernando Sauer who is in agreement with the plan of care.   Problem List/Past Medical History  Ongoing  Back pain  Back pain  Cardiomegaly  Congestive heart failure  Constipation  Diverticulosis  Hypertension  Internal disruption of lumbar intervertebral disc  Lumbar radiculopathy  Neuropathy of upper limb  Sleep apnea  Historical  Acute peptic ulcer of stomach  Chest discomfort  Laxative abuse  Pneumonia  Procedure/Surgical History  AUGUSTO COLLADO Total Hip Arthroplasty (Left) (11/02/2020)  Replacement of Left Hip Joint with Metal on Polyethylene Synthetic Substitute, Open Approach (11/02/2020)  Excision of Lumbar Vertebral Disc, Open Approach (02/20/2020)  Laminectomy Lumbar MIS (.) (02/20/2020)  Laminotomy (hemilaminectomy), with decompression of nerve root(s), including partial facetectomy, foraminotomy and/or excision of herniated intervertebral disc; 1 interspace, lumbar (02/20/2020)  Laminotomy (hemilaminectomy), with decompression of nerve root(s), including partial facetectomy, foraminotomy and/or excision of herniated intervertebral disc; each additional interspace, cervical or lumbar (List separately in addition to code for primar (02/20/2020)  Release Lumbar Nerve, Open Approach (02/20/2020)  Cystoscopy/Retrograde Pyelogram (Left) (07/02/2019)  Drainage of Bladder, Via Natural or Artificial Opening Endoscopic (07/02/2019)  Fluoroscopy of Left Kidney, Ureter and Bladder using Low Osmolar Contrast (07/02/2019)  Colonoscopy (12/28/2016)  Colonoscopy, flexible; diagnostic, including collection of specimen(s) by brushing or washing, when performed (separate procedure) (12/28/2016)  Inspection of Lower Intestinal Tract, Via Natural or Artificial Opening Endoscopic (12/28/2016)  Esophagogastroduodenoscopy  (05/25/2016)  Esophagogastroduodenoscopy, flexible, transoral; with biopsy, single or multiple (05/24/2016)  Excision of Duodenum, Via Natural or Artificial Opening Endoscopic (05/24/2016)  Colonoscopy (2004)  Right knee surgery (1987)  Bilateral inguinal hernia surgery  Repair of facial bone   Medications  Aldactone 25 mg oral tablet, 12.5 mg= 0.5 tab(s), Oral, Daily  aspirin 81 mg oral tablet, CHEWABLE, 81 mg= 1 tab(s), Chewed, Daily  atorvastatin 40 mg oral tablet, 40 mg= 1 tab(s), Oral, Daily  gabapentin 100 mg oral capsule, 200 mg= 2 cap(s), Oral, BID  lisinopril 2.5 mg oral tablet, 2.5 mg= 1 tab(s), Oral, Daily  meloxicam 7.5 mg oral tablet, 7.5 mg= 1 tab(s), Oral, Daily,? ?Not taking: Last Dose Date/Time Unknown  Nitrostat 0.4 mg sublingual tab, 0.4 mg= 1 tab(s), SL, q5min, PRN  polyethylene glycol 3350 oral powder for reconstitution, Oral, Daily  Toprol-XL 25 mg oral tablet, extended release, 25 mg= 1 tab(s), Oral, Daily  Tylenol, 500 mg= 1 tab(s), Oral, q4hr  Allergies  No Known Allergies  Social History  Abuse/Neglect  No, 11/18/2021  Alcohol  Current, Daily, 11/17/2020  Employment/School  Employed, Work/School description: [employer]. Highest education level: Some college., 06/06/2016  Exercise  Home/Environment  Lives with Spouse. Alcohol abuse in household: No. Substance abuse in household: No. Smoker in household: Yes. Injuries/Abuse/Neglect in household: No. Feels unsafe at home: No. Safe place to go: Yes. Agency(s)/Others notified: No. Family/Friends available for support: Yes. Concern for family members at home: No. Major illness in household: No. Financial concerns: No. TV/Computer concerns: No., 06/06/2016  Nutrition/Health  Regular, 06/06/2016  Sexual  Sexually active: Yes. History of sexual abuse: No., 06/06/2016  Spiritual/Cultural  Congregation, Yes, 02/12/2020  Substance Use  Past, 11/17/2020  Tobacco  Former smoker, quit more than 30 days ago, N/A, 11/18/2021  Family History  Alzheimers disease:  Father.  Diabetes mellitus type 2: Mother.  Hyperlipidemia.: Mother.  Hypertension.: Mother.  Primary malignant neoplasm of breast: Mother.  Health Maintenance  Health Maintenance  ???Pending?(in the next year)  ??? ??OverDue  ??? ? ? ?HF-ACEI/ARB Prescribed if Clinically Indicated due??12/27/17??and every 1??year(s)  ??? ? ? ?Advance Directive due??01/02/21??and every 1??year(s)  ??? ? ? ?Alcohol Misuse Screening due??01/02/21??and every 1??year(s)  ??? ? ? ?Cognitive Screening due??01/02/21??and every 1??year(s)  ??? ? ? ?Functional Assessment due??01/02/21??and every 1??year(s)  ??? ? ? ?HF-Heart Failure Education due??11/03/21??and every 1??year(s)  ??? ? ? ?Hypertension Management-BMP due??11/03/21??and every 1??year(s)  ??? ??Due?  ??? ? ? ?Diabetes Screening due??11/03/21??and every 1??year(s)  ??? ? ? ?ADL Screening due??11/18/21??and every 1??year(s)  ??? ? ? ?HF-Ejection Fraction Past 13 Months if Hospitalized due??11/18/21??and every 1??year(s)  ??? ? ? ?HF-Fluid Restriction/Low Sodium Diet Education due??11/18/21??and every 1??year(s)  ??? ? ? ?HF-LVEF due??11/18/21??Unknown Frequency  ??? ? ? ?Hypertension Management-Education due??11/18/21??and every 1??year(s)  ??? ? ? ?Medicare Annual Wellness Exam due??11/18/21??and every 1??year(s)  ??? ? ? ?Pneumococcal Vaccine due??11/18/21??Unknown Frequency  ??? ? ? ?Tetanus Vaccine due??11/18/21??and every 10??year(s)  ??? ? ? ?Zoster Vaccine due??11/18/21??Unknown Frequency  ??? ??Due In Future?  ??? ? ? ?Aspirin Therapy for CVD Prevention not due until??12/17/21??and every 1??year(s)  ??? ? ? ?Obesity Screening not due until??01/01/22??and every 1??year(s)  ??? ? ? ?Fall Risk Assessment not due until??01/02/22??and every 1??year(s)  ???Satisfied?(in the past 1 year)  ??? ??Satisfied?  ??? ? ? ?Aspirin Therapy for CVD Prevention on??12/17/20.??Satisfied by Bria Negro NP  ??? ? ? ?Blood Pressure Screening on??11/18/21.??Satisfied by Thao Wright  Ruby  ??? ? ? ?Body Mass Index Check on??11/18/21.??Satisfied by Thao Wright  ??? ? ? ?Coronary Artery Disease Maintenance-Antiplatelet Agent Prescribed on??12/17/20.??Satisfied by Bria Negro NP  ??? ? ? ?Depression Screening on??11/18/21.??Satisfied by Thao Wright  ??? ? ? ?Fall Risk Assessment on??11/18/21.??Satisfied by Thao Wright  ??? ? ? ?Hypertension Management-Blood Pressure on??11/18/21.??Satisfied by Thao Wright  ??? ? ? ?Influenza Vaccine on??11/18/21.??Satisfied by Thao Wright  ??? ? ? ?Obesity Screening on??11/18/21.??Satisfied by Thao Wright  ?  Diagnostic Results  AP &?lateral?left hip?reviewed. ?Patients implants well fixed with no signs of loosening or subsidence noted.

## 2022-07-26 DIAGNOSIS — Z86.010 PERSONAL HISTORY OF COLONIC POLYPS: Primary | ICD-10-CM

## 2022-07-27 ENCOUNTER — LAB VISIT (OUTPATIENT)
Dept: LAB | Facility: HOSPITAL | Age: 67
End: 2022-07-27
Attending: INTERNAL MEDICINE
Payer: MEDICARE

## 2022-07-27 ENCOUNTER — CLINICAL SUPPORT (OUTPATIENT)
Dept: RESPIRATORY THERAPY | Facility: HOSPITAL | Age: 67
End: 2022-07-27
Attending: INTERNAL MEDICINE
Payer: MEDICARE

## 2022-07-27 ENCOUNTER — ANESTHESIA EVENT (OUTPATIENT)
Dept: SURGERY | Facility: HOSPITAL | Age: 67
End: 2022-07-27
Payer: MEDICARE

## 2022-07-27 DIAGNOSIS — K63.5 POLYP OF COLON, UNSPECIFIED PART OF COLON, UNSPECIFIED TYPE: Primary | ICD-10-CM

## 2022-07-27 DIAGNOSIS — R79.1 ABNORMAL COAGULATION PROFILE: ICD-10-CM

## 2022-07-27 DIAGNOSIS — Z86.010 PERSONAL HISTORY OF COLONIC POLYPS: ICD-10-CM

## 2022-07-27 LAB
ALBUMIN SERPL-MCNC: 3.8 GM/DL (ref 3.4–4.8)
ALBUMIN/GLOB SERPL: 1.3 RATIO (ref 1.1–2)
ALP SERPL-CCNC: 71 UNIT/L (ref 40–150)
ALT SERPL-CCNC: 28 UNIT/L (ref 0–55)
APTT PPP: 29.1 SECONDS (ref 23.2–33.7)
AST SERPL-CCNC: 18 UNIT/L (ref 5–34)
BILIRUBIN DIRECT+TOT PNL SERPL-MCNC: 0.4 MG/DL
BUN SERPL-MCNC: 14 MG/DL (ref 8.4–25.7)
CALCIUM SERPL-MCNC: 9.1 MG/DL (ref 8.8–10)
CHLORIDE SERPL-SCNC: 105 MMOL/L (ref 98–107)
CO2 SERPL-SCNC: 26 MMOL/L (ref 23–31)
CREAT SERPL-MCNC: 0.74 MG/DL (ref 0.73–1.18)
ERYTHROCYTE [DISTWIDTH] IN BLOOD BY AUTOMATED COUNT: 14 % (ref 11.5–17)
GLOBULIN SER-MCNC: 2.9 GM/DL (ref 2.4–3.5)
GLUCOSE SERPL-MCNC: 106 MG/DL (ref 82–115)
HCT VFR BLD AUTO: 40.3 % (ref 42–52)
HGB BLD-MCNC: 13.3 GM/DL (ref 14–18)
INR BLD: 0.98 (ref 0–1.3)
MCH RBC QN AUTO: 32.2 PG (ref 27–31)
MCHC RBC AUTO-ENTMCNC: 33 MG/DL (ref 33–36)
MCV RBC AUTO: 97.6 FL (ref 80–94)
PLATELET # BLD AUTO: 253 X10(3)/MCL (ref 130–400)
PMV BLD AUTO: 9.7 FL (ref 7.4–10.4)
POTASSIUM SERPL-SCNC: 4.9 MMOL/L (ref 3.5–5.1)
PROT SERPL-MCNC: 6.7 GM/DL (ref 5.8–7.6)
PROTHROMBIN TIME: 12.9 SECONDS (ref 12.5–14.5)
RBC # BLD AUTO: 4.13 X10(6)/MCL (ref 4.7–6.1)
SODIUM SERPL-SCNC: 140 MMOL/L (ref 136–145)
WBC # SPEC AUTO: 6.4 X10(3)/MCL (ref 4.5–11.5)

## 2022-07-27 PROCEDURE — 85610 PROTHROMBIN TIME: CPT

## 2022-07-27 PROCEDURE — 85027 COMPLETE CBC AUTOMATED: CPT

## 2022-07-27 PROCEDURE — 80053 COMPREHEN METABOLIC PANEL: CPT

## 2022-07-27 PROCEDURE — 93005 ELECTROCARDIOGRAM TRACING: CPT

## 2022-07-27 PROCEDURE — 36415 COLL VENOUS BLD VENIPUNCTURE: CPT

## 2022-07-27 PROCEDURE — 85730 THROMBOPLASTIN TIME PARTIAL: CPT

## 2022-07-27 RX ORDER — GABAPENTIN 300 MG/1
1 CAPSULE ORAL 2 TIMES DAILY
COMMUNITY
Start: 2022-07-07

## 2022-07-27 RX ORDER — ATORVASTATIN CALCIUM 10 MG/1
10 TABLET, FILM COATED ORAL EVERY MORNING
COMMUNITY
Start: 2022-06-15 | End: 2022-09-08 | Stop reason: ALTCHOICE

## 2022-07-27 RX ORDER — LISINOPRIL 2.5 MG/1
2.5 TABLET ORAL EVERY MORNING
COMMUNITY
Start: 2022-07-22

## 2022-07-27 RX ORDER — SPIRONOLACTONE 25 MG/1
12.5 TABLET ORAL EVERY MORNING
COMMUNITY
Start: 2022-05-09

## 2022-07-27 RX ORDER — METOPROLOL SUCCINATE 25 MG/1
1 TABLET, EXTENDED RELEASE ORAL EVERY MORNING
COMMUNITY

## 2022-07-27 NOTE — ANESTHESIA PREPROCEDURE EVALUATION
07/27/2022  Slick Kamara is a 67 y.o., male.      Pre-op Assessment    I have reviewed the Patient Summary Reports.     I have reviewed the Nursing Notes. I have reviewed the NPO Status.   I have reviewed the Medications.     Review of Systems  Anesthesia Hx:  Denies Family Hx of Anesthesia complications.   Denies Personal Hx of Anesthesia complications.   Social:  Alcohol Use, Former Smoker    Hematology/Oncology:  Hematology Normal   Oncology Normal     EENT/Dental:EENT/Dental Normal   Cardiovascular:   Hypertension, well controlled CHF NYHA Classification III ECG has been reviewed.    Pulmonary:  Pulmonary Normal    Renal/:  Renal/ Normal     Hepatic/GI:   GERD    Musculoskeletal:  Musculoskeletal Normal    Neurological:  Neurology Normal    Endocrine:  Endocrine Normal    Dermatological:  Skin Normal    Psych:  Psychiatric Normal           Physical Exam  General: Cooperative, Alert and Oriented    Airway:  Mallampati: II   Mouth Opening: Normal  TM Distance: Normal  Tongue: Normal  Neck ROM: Normal ROM    Dental:  Intact        Anesthesia Plan  Type of Anesthesia, risks & benefits discussed:    Anesthesia Type: Gen Natural Airway  Intra-op Monitoring Plan: Standard ASA Monitors  Post Op Pain Control Plan:   (medical reason for not using multimodal pain management)  Induction:  IV  Informed Consent: Informed consent signed with the Patient and all parties understand the risks and agree with anesthesia plan.  All questions answered. Patient consented to blood products? Yes  ASA Score: 3    Ready For Surgery From Anesthesia Perspective.     .

## 2022-07-28 ENCOUNTER — ANESTHESIA (OUTPATIENT)
Dept: SURGERY | Facility: HOSPITAL | Age: 67
End: 2022-07-28
Payer: MEDICARE

## 2022-07-28 ENCOUNTER — HOSPITAL ENCOUNTER (OUTPATIENT)
Facility: HOSPITAL | Age: 67
Discharge: HOME | End: 2022-07-28
Attending: INTERNAL MEDICINE | Admitting: INTERNAL MEDICINE
Payer: MEDICARE

## 2022-07-28 DIAGNOSIS — Z86.010 HX OF COLONIC POLYPS: ICD-10-CM

## 2022-07-28 LAB — POCT GLUCOSE: 95 MG/DL (ref 70–110)

## 2022-07-28 PROCEDURE — 37000009 HC ANESTHESIA EA ADD 15 MINS: Performed by: INTERNAL MEDICINE

## 2022-07-28 PROCEDURE — G0105 COLORECTAL SCRN; HI RISK IND: HCPCS | Performed by: INTERNAL MEDICINE

## 2022-07-28 PROCEDURE — 63600175 PHARM REV CODE 636 W HCPCS: Performed by: NURSE ANESTHETIST, CERTIFIED REGISTERED

## 2022-07-28 PROCEDURE — 25000003 PHARM REV CODE 250: Performed by: NURSE ANESTHETIST, CERTIFIED REGISTERED

## 2022-07-28 PROCEDURE — 37000008 HC ANESTHESIA 1ST 15 MINUTES: Performed by: INTERNAL MEDICINE

## 2022-07-28 PROCEDURE — 63600175 PHARM REV CODE 636 W HCPCS: Performed by: ANESTHESIOLOGY

## 2022-07-28 RX ORDER — PROPOFOL 10 MG/ML
VIAL (ML) INTRAVENOUS
Status: DISCONTINUED | OUTPATIENT
Start: 2022-07-28 | End: 2022-07-28

## 2022-07-28 RX ORDER — FENTANYL CITRATE 50 UG/ML
INJECTION, SOLUTION INTRAMUSCULAR; INTRAVENOUS
Status: DISCONTINUED | OUTPATIENT
Start: 2022-07-28 | End: 2022-07-28

## 2022-07-28 RX ORDER — SODIUM CHLORIDE, SODIUM LACTATE, POTASSIUM CHLORIDE, CALCIUM CHLORIDE 600; 310; 30; 20 MG/100ML; MG/100ML; MG/100ML; MG/100ML
INJECTION, SOLUTION INTRAVENOUS CONTINUOUS
Status: DISCONTINUED | OUTPATIENT
Start: 2022-07-28 | End: 2022-07-28 | Stop reason: HOSPADM

## 2022-07-28 RX ORDER — LIDOCAINE HYDROCHLORIDE 10 MG/ML
1 INJECTION, SOLUTION EPIDURAL; INFILTRATION; INTRACAUDAL; PERINEURAL ONCE
Status: ACTIVE | OUTPATIENT
Start: 2022-07-28

## 2022-07-28 RX ORDER — SODIUM CHLORIDE 0.9 % (FLUSH) 0.9 %
10 SYRINGE (ML) INJECTION
Status: DISCONTINUED | OUTPATIENT
Start: 2022-07-28 | End: 2022-07-28 | Stop reason: HOSPADM

## 2022-07-28 RX ORDER — LIDOCAINE HYDROCHLORIDE 20 MG/ML
INJECTION INTRAVENOUS
Status: DISCONTINUED | OUTPATIENT
Start: 2022-07-28 | End: 2022-07-28

## 2022-07-28 RX ADMIN — PROPOFOL 50 MG: 10 INJECTION, EMULSION INTRAVENOUS at 12:07

## 2022-07-28 RX ADMIN — FENTANYL CITRATE 100 MCG: 50 INJECTION, SOLUTION INTRAMUSCULAR; INTRAVENOUS at 12:07

## 2022-07-28 RX ADMIN — PROPOFOL 100 MG: 10 INJECTION, EMULSION INTRAVENOUS at 12:07

## 2022-07-28 RX ADMIN — SODIUM CHLORIDE, POTASSIUM CHLORIDE, SODIUM LACTATE AND CALCIUM CHLORIDE: 600; 310; 30; 20 INJECTION, SOLUTION INTRAVENOUS at 09:07

## 2022-07-28 RX ADMIN — LIDOCAINE HYDROCHLORIDE 20 MG: 20 INJECTION, SOLUTION INTRAVENOUS at 12:07

## 2022-07-28 NOTE — ANESTHESIA POSTPROCEDURE EVALUATION
Anesthesia Post Evaluation    Patient: Slick Kamara    Procedure(s) Performed: Procedure(s) (LRB):  COLONOSCOPY (N/A)    Final Anesthesia Type: general      Patient location during evaluation: OPS  Patient participation: Yes- Able to Participate  Level of consciousness: awake and alert and oriented  Post-procedure vital signs: reviewed and stable  Pain management: adequate  Airway patency: patent    PONV status at discharge: No PONV  Anesthetic complications: no      Cardiovascular status: blood pressure returned to baseline and stable  Respiratory status: unassisted, spontaneous ventilation and room air  Hydration status: euvolemic  Follow-up not needed.  Comments: Patient to bed per self          Vitals Value Taken Time   /72 07/28/22 0905   Temp 36.5 °C (97.7 °F) 07/28/22 0905   Pulse 61 07/28/22 0905   Resp 18 07/28/22 0905   SpO2 99 % 07/28/22 0905         No case tracking events are documented in the log.      Pain/Tess Score: No data recorded

## 2022-07-29 NOTE — OP NOTE
[facility]                     [address] [address]    PATIENT NAME:      AARON FLYNN  YOB: 1955  CSN:               [id number]  MRN:               [id number]  ADMIT DATE:        07/28/2022 08:54:00  PHYSICIAN:         Vern Mitchell III, MD                          OPERATIVE REPORT      DATE OF SURGERY:    07/28/2022 00:00:00    SURGEON:  Vern Mitchell III, MD    PREOPERATIVE DIAGNOSIS:  History of colon polyps.    POSTOPERATIVE DIAGNOSIS:  Normal colon, suboptimal prep.    INDICATIONS:  A 67-year-old white male with average colorectal cancer history   and risk.  The patient was consented for the procedure in my office.  The risks   and benefits of the procedure were explained to him in detail.  He is willing to   undergo the risks.    DESCRIPTION OF PROCEDURE:  He was brought down to the endoscopy suite.  Luis Anderson CRNA, present.  Please see his documentation for medications   administered.    Rectal exam was performed.  It was within normal limits.  Prostate was mildly   enlarged.  No discrete masses.    The Olympus colonoscope was then lubricated, advanced all the way to the cecum.    The cecum was visualized and photographed.  Terminal ileum to about 5 cm.  No   abnormalities noted.  Of note, prep was suboptimal.    360-degree circumferential views were taken of the entire colon.  The cecum,   ascending colon, hepatic flexure, transverse colon, splenic flexure, descending,   and sigmoid were within normal limits.  There was some sigmoid diverticular   disease without perforation and without bleed.  The rectum in retroflexion   showed no internal abnormalities noted.    We will recommend repeat colonoscopy in 5 years pending no changes in his   clinical status.    Condition upon discharge: Stable    Disposition:  Discharge patient to home    ______________________________  Vern Mitchell  III, MD STERLING/ABDIRAHMAN  DD:  07/28/2022  Time:  01:07PM  DT:  07/28/2022  Time:  01:15PM  Job #:  [id number]      OPERATIVE REPORT

## 2022-08-01 VITALS
WEIGHT: 188 LBS | OXYGEN SATURATION: 99 % | DIASTOLIC BLOOD PRESSURE: 69 MMHG | HEART RATE: 53 BPM | TEMPERATURE: 98 F | BODY MASS INDEX: 25.5 KG/M2 | SYSTOLIC BLOOD PRESSURE: 123 MMHG | RESPIRATION RATE: 18 BRPM

## 2022-09-08 ENCOUNTER — HOSPITAL ENCOUNTER (EMERGENCY)
Facility: HOSPITAL | Age: 67
Discharge: HOME | End: 2022-09-08
Attending: INTERNAL MEDICINE
Payer: MEDICARE

## 2022-09-08 VITALS
DIASTOLIC BLOOD PRESSURE: 63 MMHG | HEART RATE: 67 BPM | WEIGHT: 195.38 LBS | OXYGEN SATURATION: 94 % | SYSTOLIC BLOOD PRESSURE: 100 MMHG | HEIGHT: 71 IN | BODY MASS INDEX: 27.35 KG/M2 | TEMPERATURE: 98 F | RESPIRATION RATE: 16 BRPM

## 2022-09-08 DIAGNOSIS — R00.2 PALPITATIONS: ICD-10-CM

## 2022-09-08 DIAGNOSIS — I47.10 SVT (SUPRAVENTRICULAR TACHYCARDIA): Primary | ICD-10-CM

## 2022-09-08 DIAGNOSIS — R00.0 TACHYCARDIA: ICD-10-CM

## 2022-09-08 LAB
ALBUMIN SERPL-MCNC: 3.3 GM/DL (ref 3.4–4.8)
ALBUMIN/GLOB SERPL: 1.2 RATIO (ref 1.1–2)
ALP SERPL-CCNC: 79 UNIT/L (ref 40–150)
ALT SERPL-CCNC: 21 UNIT/L (ref 0–55)
APPEARANCE UR: CLEAR
AST SERPL-CCNC: 21 UNIT/L (ref 5–34)
BASOPHILS # BLD AUTO: 0.05 X10(3)/MCL (ref 0–0.2)
BASOPHILS NFR BLD AUTO: 0.5 %
BILIRUB UR QL STRIP.AUTO: NEGATIVE MG/DL
BILIRUBIN DIRECT+TOT PNL SERPL-MCNC: 0.3 MG/DL
BNP BLD-MCNC: 57.8 PG/ML
BUN SERPL-MCNC: 22 MG/DL (ref 8.4–25.7)
CALCIUM SERPL-MCNC: 8.5 MG/DL (ref 8.8–10)
CHLORIDE SERPL-SCNC: 108 MMOL/L (ref 98–107)
CO2 SERPL-SCNC: 24 MMOL/L (ref 23–31)
COLOR UR AUTO: YELLOW
CREAT SERPL-MCNC: 0.9 MG/DL (ref 0.73–1.18)
EOSINOPHIL # BLD AUTO: 0.08 X10(3)/MCL (ref 0–0.9)
EOSINOPHIL NFR BLD AUTO: 0.8 %
ERYTHROCYTE [DISTWIDTH] IN BLOOD BY AUTOMATED COUNT: 13.5 % (ref 11.5–17)
ETHANOL SERPL-MCNC: <10 MG/DL
GFR SERPLBLD CREATININE-BSD FMLA CKD-EPI: >60 MLS/MIN/1.73/M2
GLOBULIN SER-MCNC: 2.8 GM/DL (ref 2.4–3.5)
GLUCOSE SERPL-MCNC: 124 MG/DL (ref 82–115)
GLUCOSE UR QL STRIP.AUTO: NEGATIVE MG/DL
HCT VFR BLD AUTO: 40.8 % (ref 42–52)
HGB BLD-MCNC: 13.7 GM/DL (ref 14–18)
IMM GRANULOCYTES # BLD AUTO: 0.02 X10(3)/MCL (ref 0–0.04)
IMM GRANULOCYTES NFR BLD AUTO: 0.2 %
KETONES UR QL STRIP.AUTO: ABNORMAL MG/DL
LACTATE SERPL-SCNC: 0.9 MMOL/L (ref 0.5–2.2)
LEUKOCYTE ESTERASE UR QL STRIP.AUTO: NEGATIVE UNIT/L
LYMPHOCYTES # BLD AUTO: 2.61 X10(3)/MCL (ref 0.6–4.6)
LYMPHOCYTES NFR BLD AUTO: 26.8 %
MAGNESIUM SERPL-MCNC: 2.1 MG/DL (ref 1.6–2.6)
MCH RBC QN AUTO: 32.5 PG (ref 27–31)
MCHC RBC AUTO-ENTMCNC: 33.6 MG/DL (ref 33–36)
MCV RBC AUTO: 96.7 FL (ref 80–94)
MONOCYTES # BLD AUTO: 1.19 X10(3)/MCL (ref 0.1–1.3)
MONOCYTES NFR BLD AUTO: 12.2 %
NEUTROPHILS # BLD AUTO: 5.8 X10(3)/MCL (ref 2.1–9.2)
NEUTROPHILS NFR BLD AUTO: 59.5 %
NITRITE UR QL STRIP.AUTO: NEGATIVE
PH UR STRIP.AUTO: 7.5 [PH]
PLATELET # BLD AUTO: 246 X10(3)/MCL (ref 130–400)
PMV BLD AUTO: 10.1 FL (ref 7.4–10.4)
POTASSIUM SERPL-SCNC: 4.2 MMOL/L (ref 3.5–5.1)
PROT SERPL-MCNC: 6.1 GM/DL (ref 5.8–7.6)
PROT UR QL STRIP.AUTO: NEGATIVE MG/DL
RBC # BLD AUTO: 4.22 X10(6)/MCL (ref 4.7–6.1)
RBC UR QL AUTO: NEGATIVE UNIT/L
SARS-COV-2 RDRP RESP QL NAA+PROBE: NEGATIVE
SODIUM SERPL-SCNC: 139 MMOL/L (ref 136–145)
SP GR UR STRIP.AUTO: 1.02
TROPONIN I SERPL-MCNC: 0.02 NG/ML (ref 0–0.04)
TSH SERPL-ACNC: 0.92 UIU/ML (ref 0.35–4.94)
UROBILINOGEN UR STRIP-ACNC: 0.2 MG/DL
WBC # SPEC AUTO: 9.7 X10(3)/MCL (ref 4.5–11.5)

## 2022-09-08 PROCEDURE — 93005 ELECTROCARDIOGRAM TRACING: CPT

## 2022-09-08 PROCEDURE — 36415 COLL VENOUS BLD VENIPUNCTURE: CPT | Mod: 59 | Performed by: INTERNAL MEDICINE

## 2022-09-08 PROCEDURE — 84443 ASSAY THYROID STIM HORMONE: CPT | Performed by: INTERNAL MEDICINE

## 2022-09-08 PROCEDURE — 87635 SARS-COV-2 COVID-19 AMP PRB: CPT | Performed by: INTERNAL MEDICINE

## 2022-09-08 PROCEDURE — 81003 URINALYSIS AUTO W/O SCOPE: CPT | Performed by: INTERNAL MEDICINE

## 2022-09-08 PROCEDURE — 83735 ASSAY OF MAGNESIUM: CPT | Performed by: INTERNAL MEDICINE

## 2022-09-08 PROCEDURE — 83605 ASSAY OF LACTIC ACID: CPT | Performed by: INTERNAL MEDICINE

## 2022-09-08 PROCEDURE — 80053 COMPREHEN METABOLIC PANEL: CPT | Performed by: INTERNAL MEDICINE

## 2022-09-08 PROCEDURE — 82077 ASSAY SPEC XCP UR&BREATH IA: CPT | Performed by: INTERNAL MEDICINE

## 2022-09-08 PROCEDURE — 85025 COMPLETE CBC W/AUTO DIFF WBC: CPT | Performed by: INTERNAL MEDICINE

## 2022-09-08 PROCEDURE — 84484 ASSAY OF TROPONIN QUANT: CPT | Performed by: INTERNAL MEDICINE

## 2022-09-08 PROCEDURE — 25000003 PHARM REV CODE 250: Performed by: INTERNAL MEDICINE

## 2022-09-08 PROCEDURE — 99291 CRITICAL CARE FIRST HOUR: CPT

## 2022-09-08 PROCEDURE — 83880 ASSAY OF NATRIURETIC PEPTIDE: CPT | Performed by: INTERNAL MEDICINE

## 2022-09-08 RX ORDER — METOPROLOL TARTRATE 1 MG/ML
5 INJECTION, SOLUTION INTRAVENOUS
Status: COMPLETED | OUTPATIENT
Start: 2022-09-08 | End: 2022-09-08

## 2022-09-08 RX ORDER — METOPROLOL TARTRATE 25 MG/1
25 TABLET, FILM COATED ORAL
Status: COMPLETED | OUTPATIENT
Start: 2022-09-08 | End: 2022-09-08

## 2022-09-08 RX ORDER — SODIUM CHLORIDE 9 MG/ML
125 INJECTION, SOLUTION INTRAVENOUS ONCE
Status: COMPLETED | OUTPATIENT
Start: 2022-09-08 | End: 2022-09-08

## 2022-09-08 RX ADMIN — SODIUM CHLORIDE 125 ML/HR: 9 INJECTION, SOLUTION INTRAVENOUS at 02:09

## 2022-09-08 RX ADMIN — SODIUM CHLORIDE 1000 ML: 9 INJECTION, SOLUTION INTRAVENOUS at 01:09

## 2022-09-08 RX ADMIN — METOPROLOL TARTRATE 5 MG: 5 INJECTION, SOLUTION INTRAVENOUS at 02:09

## 2022-09-08 RX ADMIN — METOPROLOL TARTRATE 25 MG: 25 TABLET, FILM COATED ORAL at 01:09

## 2022-09-08 NOTE — ED NOTES
Pt arrives to ED ambulatory with reports of heart racing, SOB and left sided chest pain starting approx 1 hour pta. Pt states hard to catch breath. Pt also reports cough x3 days and requesting a COVID test while here. NAD is noted at time of assessment. Will continue to monitor. Caridac monitoring applied.

## 2022-09-08 NOTE — ED PROVIDER NOTES
Encounter Date: 9/8/2022       History     Chief Complaint   Patient presents with    Tachycardia     C/o heart racing, SOB and left sided chest pain starting tonight, tachycardic on arrival, speaking freely in triage     67-year-old white male presents emergency department with rapid heartbeat and chest tightness.  Patient states that he did some yd work earlier and drink a couple of beers them went inside had dinner and went to bed and shortly after he notes that it was feeling like his heart was racing so he checked his pressure his heart rate his heart rate was in the 150s 160s so he came to the emergency department.  He states this happened 1 time in the past he was given some fluids and medications and his heart rate slowed down and he has never been told that he and irregular heartbeat    Review of patient's allergies indicates:  No Known Allergies  Past Medical History:   Diagnosis Date    CHF (congestive heart failure)     GERD (gastroesophageal reflux disease)     High cholesterol     HTN (hypertension)      Past Surgical History:   Procedure Laterality Date    COLONOSCOPY      COLONOSCOPY N/A 7/28/2022    Procedure: COLONOSCOPY;  Surgeon: Vern Mitchell III, MD;  Location: [facility];  Service: Endoscopy;  Laterality: N/A;  Suboptimal prep. Diverticulosis without perforation.    FACIAL FRACTURE SURGERY      HERNIA REPAIR      HIP REPLACEMENT ARTHROPLASTY Left     KNEE ARTHROSCOPY Right     WRIST SURGERY Right      Family History   Problem Relation Age of Onset    Hypertension Mother     Hyperlipidemia Mother     Diabetes Mother     Breast cancer Mother     Pneumonia Father     Colon cancer Brother      Social History     Tobacco Use    Smoking status: Former    Smokeless tobacco: Never   Substance Use Topics    Alcohol use: Yes     Alcohol/week: 7.0 standard drinks     Types: 7 Cans of beer per week    Drug use: Never     Review of Systems   Constitutional: Negative.  Negative for activity change,  appetite change, chills, diaphoresis, fatigue, fever and unexpected weight change.   HENT: Negative.  Negative for congestion, dental problem, drooling, ear discharge, ear pain, facial swelling, hearing loss, mouth sores, nosebleeds, postnasal drip, rhinorrhea, sinus pressure, sinus pain, sneezing, sore throat, tinnitus, trouble swallowing and voice change.    Eyes: Negative.  Negative for photophobia, pain, discharge, redness, itching and visual disturbance.   Respiratory: Negative.  Negative for apnea, cough, choking, chest tightness, shortness of breath, wheezing and stridor.    Cardiovascular:  Positive for chest pain and palpitations. Negative for leg swelling.   Gastrointestinal: Negative.  Negative for abdominal distention, abdominal pain, anal bleeding, blood in stool, constipation, diarrhea, nausea, rectal pain and vomiting.   Endocrine: Negative.  Negative for cold intolerance, heat intolerance, polydipsia, polyphagia and polyuria.   Genitourinary: Negative.  Negative for decreased urine volume, difficulty urinating, dysuria, enuresis, flank pain, frequency, genital sores, hematuria, penile discharge, penile pain, penile swelling, scrotal swelling, testicular pain and urgency.   Musculoskeletal: Negative.  Negative for arthralgias, back pain, gait problem, joint swelling, myalgias, neck pain and neck stiffness.   Skin: Negative.  Negative for color change, pallor, rash and wound.   Allergic/Immunologic: Negative.  Negative for environmental allergies, food allergies and immunocompromised state.   Neurological: Negative.  Negative for dizziness, tremors, seizures, syncope, facial asymmetry, speech difficulty, weakness, light-headedness, numbness and headaches.   Hematological: Negative.  Negative for adenopathy. Does not bruise/bleed easily.   Psychiatric/Behavioral: Negative.  Negative for agitation, behavioral problems, confusion, decreased concentration, dysphoric mood, hallucinations, self-injury, sleep  disturbance and suicidal ideas. The patient is not nervous/anxious and is not hyperactive.    All other systems reviewed and are negative.    Physical Exam     Initial Vitals [09/08/22 0124]   BP Pulse Resp Temp SpO2   (!) 138/91 (!) 147 18 97.7 °F (36.5 °C) 95 %      MAP       --         Physical Exam    Nursing note and vitals reviewed.  Constitutional: He appears well-developed and well-nourished.   HENT:   Head: Normocephalic and atraumatic.   Eyes: Conjunctivae and EOM are normal. Pupils are equal, round, and reactive to light.   Neck: Neck supple.   Normal range of motion.  Cardiovascular:    Tachycardia present.         Heart rate is in the 160s to 170s   Pulmonary/Chest: Breath sounds normal.   Abdominal: Abdomen is soft. Bowel sounds are normal.   Musculoskeletal:         General: Normal range of motion.      Cervical back: Normal range of motion and neck supple.     Neurological: He is alert and oriented to person, place, and time.   Skin: Skin is warm and dry. Capillary refill takes less than 2 seconds.   Psychiatric: He has a normal mood and affect. His behavior is normal. Judgment and thought content normal.       ED Course   Critical Care    Date/Time: 9/8/2022 3:31 AM  Performed by: Tommie Gillette MD  Authorized by: Tommie Gillette MD   Direct patient critical care time: 30 minutes  Additional history critical care time: 5 minutes  Ordering / reviewing critical care time: 10 minutes  Documentation critical care time: 5 minutes  Total critical care time (exclusive of procedural time) : 50 minutes  Critical care time was exclusive of separately billable procedures and treating other patients.  Critical care was necessary to treat or prevent imminent or life-threatening deterioration of the following conditions: cardiac failure, circulatory failure and metabolic crisis.  Critical care was time spent personally by me on the following activities: development of treatment plan with patient or  surrogate, interpretation of cardiac output measurements, evaluation of patient's response to treatment, examination of patient, obtaining history from patient or surrogate, ordering and performing treatments and interventions, ordering and review of laboratory studies, ordering and review of radiographic studies, pulse oximetry, re-evaluation of patient's condition and review of old charts.      Admission on 09/08/2022   Component Date Value Ref Range Status    Sodium Level 09/08/2022 139  136 - 145 mmol/L Final    Potassium Level 09/08/2022 4.2  3.5 - 5.1 mmol/L Final    Chloride 09/08/2022 108 (H)  98 - 107 mmol/L Final    Carbon Dioxide 09/08/2022 24  23 - 31 mmol/L Final    Glucose Level 09/08/2022 124 (H)  82 - 115 mg/dL Final    Blood Urea Nitrogen 09/08/2022 22.0  8.4 - 25.7 mg/dL Final    Creatinine 09/08/2022 0.90  0.73 - 1.18 mg/dL Final    Calcium Level Total 09/08/2022 8.5 (L)  8.8 - 10.0 mg/dL Final    Protein Total 09/08/2022 6.1  5.8 - 7.6 gm/dL Final    Albumin Level 09/08/2022 3.3 (L)  3.4 - 4.8 gm/dL Final    Globulin 09/08/2022 2.8  2.4 - 3.5 gm/dL Final    Albumin/Globulin Ratio 09/08/2022 1.2  1.1 - 2.0 ratio Final    Bilirubin Total 09/08/2022 0.3  <=1.5 mg/dL Final    Alkaline Phosphatase 09/08/2022 79  40 - 150 unit/L Final    Alanine Aminotransferase 09/08/2022 21  0 - 55 unit/L Final    Aspartate Aminotransferase 09/08/2022 21  5 - 34 unit/L Final    eGFR 09/08/2022 >60  mls/min/1.73/m2 Final    Color, UA 09/08/2022 Yellow  Yellow, Colorless, Other, Clear Final    Appearance, UA 09/08/2022 Clear  Clear Final    Specific Gravity, UA 09/08/2022 1.020   Final    pH, UA 09/08/2022 7.5  5.0, 5.5, 6.0, 6.5, 7.0, 7.5, 8.0, 8.5 Final    Protein, UA 09/08/2022 Negative  Negative, 300  mg/dL Final    Glucose, UA 09/08/2022 Negative  Negative, Normal mg/dL Final    Ketones, UA 09/08/2022 Trace (A)  Negative, +1, +2, +3, +4, +5, >=160, >=80 mg/dL Final    Blood, UA 09/08/2022 Negative  Negative unit/L  Final    Bilirubin, UA 09/08/2022 Negative  Negative mg/dL Final    Urobilinogen, UA 09/08/2022 0.2  0.2, 1.0, Normal mg/dL Final    Nitrites, UA 09/08/2022 Negative  Negative Final    Leukocyte Esterase, UA 09/08/2022 Negative  Negative, 75  unit/L Final    Ethanol Level 09/08/2022 <10.0  <=10.0 mg/dL Final    Lactic Acid Level 09/08/2022 0.9  0.5 - 2.2 mmol/L Final    Natriuretic Peptide 09/08/2022 57.8  <=100.0 pg/mL Final    Troponin-I 09/08/2022 0.020  0.000 - 0.045 ng/mL Final    Thyroid Stimulating Hormone 09/08/2022 0.9245  0.3500 - 4.9400 uIU/mL Final    Magnesium Level 09/08/2022 2.10  1.60 - 2.60 mg/dL Final    WBC 09/08/2022 9.7  4.5 - 11.5 x10(3)/mcL Final    RBC 09/08/2022 4.22 (L)  4.70 - 6.10 x10(6)/mcL Final    Hgb 09/08/2022 13.7 (L)  14.0 - 18.0 gm/dL Final    Hct 09/08/2022 40.8 (L)  42.0 - 52.0 % Final    MCV 09/08/2022 96.7 (H)  80.0 - 94.0 fL Final    MCH 09/08/2022 32.5 (H)  27.0 - 31.0 pg Final    MCHC 09/08/2022 33.6  33.0 - 36.0 mg/dL Final    RDW 09/08/2022 13.5  11.5 - 17.0 % Final    Platelet 09/08/2022 246  130 - 400 x10(3)/mcL Final    MPV 09/08/2022 10.1  7.4 - 10.4 fL Final    Neut % 09/08/2022 59.5  % Final    Lymph % 09/08/2022 26.8  % Final    Mono % 09/08/2022 12.2  % Final    Eos % 09/08/2022 0.8  % Final    Basophil % 09/08/2022 0.5  % Final    Lymph # 09/08/2022 2.61  0.6 - 4.6 x10(3)/mcL Final    Neut # 09/08/2022 5.8  2.1 - 9.2 x10(3)/mcL Final    Mono # 09/08/2022 1.19  0.1 - 1.3 x10(3)/mcL Final    Eos # 09/08/2022 0.08  0 - 0.9 x10(3)/mcL Final    Baso # 09/08/2022 0.05  0 - 0.2 x10(3)/mcL Final    IG# 09/08/2022 0.02  0 - 0.04 x10(3)/mcL Final    IG% 09/08/2022 0.2  % Final    SARS COV-2 MOLECULAR 09/08/2022 Negative  Negative Final       Labs Reviewed   COMPREHENSIVE METABOLIC PANEL - Abnormal; Notable for the following components:       Result Value    Chloride 108 (*)     Glucose Level 124 (*)     Calcium Level Total 8.5 (*)     Albumin Level 3.3 (*)     All other  components within normal limits   URINALYSIS, REFLEX TO URINE CULTURE - Abnormal; Notable for the following components:    Ketones, UA Trace (*)     All other components within normal limits   CBC WITH DIFFERENTIAL - Abnormal; Notable for the following components:    RBC 4.22 (*)     Hgb 13.7 (*)     Hct 40.8 (*)     MCV 96.7 (*)     MCH 32.5 (*)     All other components within normal limits   ALCOHOL,MEDICAL (ETHANOL) - Normal   LACTIC ACID, PLASMA - Normal   B-TYPE NATRIURETIC PEPTIDE - Normal   TROPONIN I - Normal   TSH - Normal   MAGNESIUM - Normal   SARS-COV-2 RNA AMPLIFICATION, QUAL - Normal   CBC W/ AUTO DIFFERENTIAL    Narrative:     The following orders were created for panel order CBC auto differential.  Procedure                               Abnormality         Status                     ---------                               -----------         ------                     CBC with Differential[[id number]]        Abnormal            Final result                 Please view results for these tests on the individual orders.     EKG Readings: (Independently Interpreted)   EKG done at 1:23 a.m. on 09/08/2022 shows SVT with a heart rate of 143 with a left axis deviation and some nonspecific T-wave abnormalities    Repeat EKG done at 2:51 a.m. shows normal sinus rhythm with a ventricular rate of 71 with a left axis deviation       Imaging Results              X-Ray Chest AP Portable (Preliminary result)  Result time 09/08/22 03:30:52      ED Interpretation by Tommie Gillette MD (09/08/22 03:30:52, [facility] - Emergency Dept, Emergency Medicine)    No acute cardiopulmonary changes noted, enlarged cardiac silhouette seen                                     Medications   sodium chloride 0.9% bolus 1,000 mL (0 mLs Intravenous Stopped 9/8/22 0206)   0.9%  NaCl infusion (125 mL/hr Intravenous New Bag 9/8/22 0205)   metoprolol injection 5 mg (5 mg Intravenous Given 9/8/22 0200)   metoprolol tartrate  (LOPRESSOR) tablet 25 mg (25 mg Oral Given 9/8/22 0159)                 ED Course as of 09/08/22 0333   Thu Sep 08, 2022   0200 Patient is in the room and is being getting IV fluids and still very tachycardic so I have elected to give him IV and oral metoprolol [PL]   0232 Patient is responding well to metoprolol he is feeling better his heart rate is still above 100 but is slowly improving [PL]   0315 Patient's heart rate is now in the 70s and he feels good enough to go home [PL]      ED Course User Index  [PL] Tommie Gillette MD             Clinical Impression:   Final diagnoses:  [R00.0] Tachycardia  [I47.1] SVT (supraventricular tachycardia) (Primary)  [R00.2] Palpitations        ED Disposition Condition    Discharge Stable          ED Prescriptions    None       Follow-up Information       Follow up With Specialties Details Why Contact Info    Vern Mitchell III, MD Internal Medicine In 3 days  [address]  [place] [address]  [phone]                 Tommie Gillette MD  09/08/22 0332

## 2022-11-04 ENCOUNTER — DOCUMENTATION ONLY (OUTPATIENT)
Dept: PRIMARY CARE CLINIC | Facility: CLINIC | Age: 67
End: 2022-11-04
Payer: MEDICARE

## 2023-05-19 ENCOUNTER — HOSPITAL ENCOUNTER (OUTPATIENT)
Dept: RADIOLOGY | Facility: HOSPITAL | Age: 68
Discharge: HOME | End: 2023-05-19
Attending: FAMILY MEDICINE
Payer: MEDICARE

## 2023-05-19 DIAGNOSIS — I50.23 ACUTE ON CHRONIC SYSTOLIC HEART FAILURE: ICD-10-CM

## 2023-05-19 PROCEDURE — 71046 X-RAY EXAM CHEST 2 VIEWS: CPT | Mod: TC

## 2023-11-15 ENCOUNTER — HOSPITAL ENCOUNTER (EMERGENCY)
Facility: HOSPITAL | Age: 68
Discharge: HOME | End: 2023-11-15
Attending: FAMILY MEDICINE
Payer: MEDICARE

## 2023-11-15 VITALS
OXYGEN SATURATION: 97 % | WEIGHT: 213 LBS | TEMPERATURE: 98 F | BODY MASS INDEX: 29.71 KG/M2 | RESPIRATION RATE: 16 BRPM | SYSTOLIC BLOOD PRESSURE: 116 MMHG | HEART RATE: 76 BPM | DIASTOLIC BLOOD PRESSURE: 77 MMHG

## 2023-11-15 DIAGNOSIS — R06.00 DYSPNEA: ICD-10-CM

## 2023-11-15 DIAGNOSIS — J06.9 VIRAL UPPER RESPIRATORY INFECTION: Primary | ICD-10-CM

## 2023-11-15 LAB
ALBUMIN SERPL-MCNC: 3.7 G/DL (ref 3.4–4.8)
ALBUMIN/GLOB SERPL: 1.2 RATIO (ref 1.1–2)
ALP SERPL-CCNC: 70 UNIT/L (ref 40–150)
ALT SERPL-CCNC: 35 UNIT/L (ref 0–55)
AST SERPL-CCNC: 25 UNIT/L (ref 5–34)
BASOPHILS # BLD AUTO: 0.04 X10(3)/MCL
BASOPHILS NFR BLD AUTO: 0.6 %
BILIRUB SERPL-MCNC: 0.6 MG/DL
BNP BLD-MCNC: 497 PG/ML
BUN SERPL-MCNC: 15 MG/DL (ref 8.4–25.7)
CALCIUM SERPL-MCNC: 9.1 MG/DL (ref 8.8–10)
CHLORIDE SERPL-SCNC: 102 MMOL/L (ref 98–107)
CK MB SERPL-MCNC: 1.9 NG/ML
CK SERPL-CCNC: 107 U/L (ref 30–200)
CO2 SERPL-SCNC: 27 MMOL/L (ref 23–31)
CREAT SERPL-MCNC: 1.01 MG/DL (ref 0.73–1.18)
EOSINOPHIL # BLD AUTO: 0.16 X10(3)/MCL (ref 0–0.9)
EOSINOPHIL NFR BLD AUTO: 2.6 %
ERYTHROCYTE [DISTWIDTH] IN BLOOD BY AUTOMATED COUNT: 12.7 % (ref 11.5–17)
FLUAV AG UPPER RESP QL IA.RAPID: NOT DETECTED
FLUAV AG UPPER RESP QL IA.RAPID: NOT DETECTED
FLUBV AG UPPER RESP QL IA.RAPID: NOT DETECTED
FLUBV AG UPPER RESP QL IA.RAPID: NOT DETECTED
GFR SERPLBLD CREATININE-BSD FMLA CKD-EPI: >60 MLS/MIN/1.73/M2
GLOBULIN SER-MCNC: 3 GM/DL (ref 2.4–3.5)
GLUCOSE SERPL-MCNC: 118 MG/DL (ref 82–115)
HCT VFR BLD AUTO: 38.5 % (ref 42–52)
HGB BLD-MCNC: 12.9 G/DL (ref 14–18)
IMM GRANULOCYTES # BLD AUTO: 0.02 X10(3)/MCL (ref 0–0.04)
IMM GRANULOCYTES NFR BLD AUTO: 0.3 %
LYMPHOCYTES # BLD AUTO: 1.96 X10(3)/MCL (ref 0.6–4.6)
LYMPHOCYTES NFR BLD AUTO: 31.3 %
MAGNESIUM SERPL-MCNC: 2.3 MG/DL (ref 1.6–2.6)
MCH RBC QN AUTO: 32.3 PG (ref 27–31)
MCHC RBC AUTO-ENTMCNC: 33.5 G/DL (ref 33–36)
MCV RBC AUTO: 96.5 FL (ref 80–94)
MONOCYTES # BLD AUTO: 0.67 X10(3)/MCL (ref 0.1–1.3)
MONOCYTES NFR BLD AUTO: 10.7 %
NEUTROPHILS # BLD AUTO: 3.41 X10(3)/MCL (ref 2.1–9.2)
NEUTROPHILS NFR BLD AUTO: 54.5 %
PLATELET # BLD AUTO: 189 X10(3)/MCL (ref 130–400)
PMV BLD AUTO: 10.9 FL (ref 7.4–10.4)
POTASSIUM SERPL-SCNC: 3.9 MMOL/L (ref 3.5–5.1)
PROT SERPL-MCNC: 6.7 GM/DL (ref 5.8–7.6)
RBC # BLD AUTO: 3.99 X10(6)/MCL (ref 4.7–6.1)
RSV A 5' UTR RNA NPH QL NAA+PROBE: DETECTED
SARS-COV-2 RNA RESP QL NAA+PROBE: NOT DETECTED
SARS-COV-2 RNA RESP QL NAA+PROBE: NOT DETECTED
SODIUM SERPL-SCNC: 139 MMOL/L (ref 136–145)
TROPONIN I SERPL-MCNC: 0.01 NG/ML (ref 0–0.04)
WBC # SPEC AUTO: 6.26 X10(3)/MCL (ref 4.5–11.5)

## 2023-11-15 PROCEDURE — 82550 ASSAY OF CK (CPK): CPT | Performed by: FAMILY MEDICINE

## 2023-11-15 PROCEDURE — 99285 EMERGENCY DEPT VISIT HI MDM: CPT | Mod: 25

## 2023-11-15 PROCEDURE — 84484 ASSAY OF TROPONIN QUANT: CPT | Performed by: FAMILY MEDICINE

## 2023-11-15 PROCEDURE — 83735 ASSAY OF MAGNESIUM: CPT | Performed by: FAMILY MEDICINE

## 2023-11-15 PROCEDURE — 25000003 PHARM REV CODE 250: Performed by: FAMILY MEDICINE

## 2023-11-15 PROCEDURE — 93005 ELECTROCARDIOGRAM TRACING: CPT

## 2023-11-15 PROCEDURE — 0240U COVID/FLU A&B PCR: CPT | Performed by: FAMILY MEDICINE

## 2023-11-15 PROCEDURE — 83880 ASSAY OF NATRIURETIC PEPTIDE: CPT | Performed by: FAMILY MEDICINE

## 2023-11-15 PROCEDURE — 93010 EKG 12-LEAD: ICD-10-PCS | Mod: ,,, | Performed by: INTERNAL MEDICINE

## 2023-11-15 PROCEDURE — 0241U COVID/RSV/FLU A&B PCR: CPT | Performed by: FAMILY MEDICINE

## 2023-11-15 PROCEDURE — 80053 COMPREHEN METABOLIC PANEL: CPT | Performed by: FAMILY MEDICINE

## 2023-11-15 PROCEDURE — 93010 ELECTROCARDIOGRAM REPORT: CPT | Mod: ,,, | Performed by: INTERNAL MEDICINE

## 2023-11-15 PROCEDURE — 82553 CREATINE MB FRACTION: CPT | Performed by: FAMILY MEDICINE

## 2023-11-15 PROCEDURE — 85025 COMPLETE CBC W/AUTO DIFF WBC: CPT | Performed by: FAMILY MEDICINE

## 2023-11-15 RX ORDER — CETIRIZINE HYDROCHLORIDE 10 MG/1
10 TABLET ORAL DAILY
Qty: 14 TABLET | Refills: 0 | Status: SHIPPED | OUTPATIENT
Start: 2023-11-15 | End: 2023-11-29

## 2023-11-15 RX ORDER — FLUTICASONE PROPIONATE 50 MCG
2 SPRAY, SUSPENSION (ML) NASAL DAILY
Qty: 15 G | Refills: 0 | Status: SHIPPED | OUTPATIENT
Start: 2023-11-15

## 2023-11-15 RX ORDER — ACETAMINOPHEN 325 MG/1
650 TABLET ORAL
Status: COMPLETED | OUTPATIENT
Start: 2023-11-15 | End: 2023-11-15

## 2023-11-15 RX ORDER — METHYLPREDNISOLONE 4 MG/1
TABLET ORAL
Qty: 1 EACH | Refills: 0 | Status: SHIPPED | OUTPATIENT
Start: 2023-11-15

## 2023-11-15 RX ORDER — BENZONATATE 100 MG/1
100 CAPSULE ORAL 3 TIMES DAILY PRN
Qty: 20 CAPSULE | Refills: 0 | Status: SHIPPED | OUTPATIENT
Start: 2023-11-15 | End: 2023-12-06

## 2023-11-15 RX ORDER — BENZONATATE 100 MG/1
200 CAPSULE ORAL
Status: COMPLETED | OUTPATIENT
Start: 2023-11-15 | End: 2023-11-15

## 2023-11-15 RX ADMIN — ACETAMINOPHEN 650 MG: 325 TABLET, FILM COATED ORAL at 04:11

## 2023-11-15 RX ADMIN — BENZONATATE 200 MG: 100 CAPSULE ORAL at 04:11

## 2023-11-15 NOTE — ED PROVIDER NOTES
Encounter Date: 11/15/2023       History     Chief Complaint   Patient presents with    Generalized Body Aches     States began 2 days ago with cough, body aches, and headache     Patient is a 68-year-old gentleman presents emergency room with complaints of 2 days' history of productive cough, shortness of breath, postnasal drip, body aches.  Denies fevers.  Reports he had seen his cardiologist 2 days prior and was discontinued from lisinopril due to concerns of the lisinopril my it has been contributing to his cough.  Patient reports cough is worse at nighttime, and often feels that he was smothering.  Patient has to sit up in a chair to get relief at nighttime.  Patient reports during the day, he was able to be more mobile without difficulty.  Patient has a history of nonischemic cardiomyopathy, currently compliant with medications.  Denies chest pain.  Denies abdominal pain nausea or vomiting.    The history is provided by the patient.     Review of patient's allergies indicates:  No Known Allergies  Past Medical History:   Diagnosis Date    CHF (congestive heart failure)     GERD (gastroesophageal reflux disease)     High cholesterol     HTN (hypertension)      Past Surgical History:   Procedure Laterality Date    COLONOSCOPY  12/28/2016    COLONOSCOPY N/A 07/28/2022    Procedure: COLONOSCOPY;  Surgeon: Vern Mitchell III, MD;  Location: [facility];  Service: Endoscopy;  Laterality: N/A;  Suboptimal prep. Diverticulosis without perforation.    FACIAL FRACTURE SURGERY      HERNIA REPAIR      HIP REPLACEMENT ARTHROPLASTY Left     KNEE ARTHROSCOPY Right     WRIST SURGERY Right      Family History   Problem Relation Age of Onset    Hypertension Mother     Hyperlipidemia Mother     Diabetes Mother     Breast cancer Mother     Pneumonia Father     Colon cancer Brother      Social History     Tobacco Use    Smoking status: Former    Smokeless tobacco: Never   Substance Use Topics    Alcohol use: Yes     Alcohol/week:  7.0 standard drinks of alcohol     Types: 7 Cans of beer per week    Drug use: Never     Review of Systems   Constitutional:  Positive for fatigue. Negative for chills and fever.   HENT:  Negative for ear pain, rhinorrhea and sore throat.    Eyes:  Negative for photophobia and pain.   Respiratory:  Positive for cough and shortness of breath. Negative for wheezing.    Cardiovascular:  Negative for chest pain.   Gastrointestinal:  Negative for abdominal pain, diarrhea, nausea and vomiting.   Genitourinary:  Negative for dysuria.   Musculoskeletal:  Positive for myalgias.   Neurological:  Negative for dizziness, weakness and headaches.   All other systems reviewed and are negative.      Physical Exam     Initial Vitals [11/15/23 0401]   BP Pulse Resp Temp SpO2   124/79 75 18 97.5 °F (36.4 °C) 99 %      MAP       --         Physical Exam    Nursing note and vitals reviewed.  Constitutional: He appears well-developed and well-nourished.   HENT:   Head: Normocephalic and atraumatic.   Mouth/Throat: Oropharynx is clear and moist.   Eyes: EOM are normal. Pupils are equal, round, and reactive to light.   Neck: Neck supple.   Normal range of motion.  Cardiovascular:  Normal rate, regular rhythm, normal heart sounds and intact distal pulses.     Exam reveals no gallop and no friction rub.       No murmur heard.  Pulmonary/Chest: Breath sounds normal. No respiratory distress. He has no wheezes. He has no rhonchi.   Abdominal: Abdomen is soft. Bowel sounds are normal. He exhibits no distension. There is no abdominal tenderness.   Musculoskeletal:         General: Normal range of motion.      Cervical back: Normal range of motion and neck supple.     Neurological: He is alert and oriented to person, place, and time. He has normal strength.   Skin: Skin is warm and dry. Capillary refill takes less than 2 seconds.   Psychiatric: He has a normal mood and affect. His behavior is normal. Judgment and thought content normal.         ED  Course   Procedures  Labs Reviewed   COMPREHENSIVE METABOLIC PANEL - Abnormal; Notable for the following components:       Result Value    Glucose Level 118 (*)     All other components within normal limits   B-TYPE NATRIURETIC PEPTIDE - Abnormal; Notable for the following components:    Natriuretic Peptide 497.0 (*)     All other components within normal limits   CBC WITH DIFFERENTIAL - Abnormal; Notable for the following components:    RBC 3.99 (*)     Hgb 12.9 (*)     Hct 38.5 (*)     MCV 96.5 (*)     MCH 32.3 (*)     MPV 10.9 (*)     All other components within normal limits   COVID/RSV/FLU A&B PCR - Abnormal; Notable for the following components:    Respiratory Syncytial Virus PCR Detected (*)     All other components within normal limits    Narrative:     The Xpert Xpress SARS-CoV-2/FLU/RSV plus is a rapid, multiplexed real-time PCR test intended for the simultaneous qualitative detection and differentiation of SARS-CoV-2, Influenza A, Influenza B, and respiratory syncytial virus (RSV) viral RNA in either nasopharyngeal swab or nasal swab specimens.         COVID/FLU A&B PCR - Normal    Narrative:     The Xpert Xpress SARS-CoV-2/FLU/RSV plus is a rapid, multiplexed real-time PCR test intended for the simultaneous qualitative detection and differentiation of SARS-CoV-2, Influenza A, Influenza B, and respiratory syncytial virus (RSV) viral RNA in either nasopharyngeal swab or nasal swab specimens.         CK - Normal   CK-MB - Normal   TROPONIN I - Normal   MAGNESIUM - Normal   CBC W/ AUTO DIFFERENTIAL    Narrative:     The following orders were created for panel order CBC Auto Differential.  Procedure                               Abnormality         Status                     ---------                               -----------         ------                     CBC with Differential[[id number]]        Abnormal            Final result                 Please view results for these tests on the individual orders.      EKG Readings: (Independently Interpreted)   My Independent EKG Interpretation  11/15/2023 4:20 AM  Rate: 75 bpm  Rhythm: Sinus  Axis: Leftward  Intervals: Normal  ST Changes: Nonspecific  Impression: Normal sinus rhythm with nonspecific ST changes           Imaging Results              X-Ray Chest 1 View (Preliminary result)  Result time 11/15/23 04:41:29      Wet Read by Wilfredo Edwards MD (11/15/23 04:41:29, [facility] - Emergency Dept, Emergency Medicine)    No acute abnormality appreciated.                                     Medications   benzonatate capsule 200 mg (200 mg Oral Given 11/15/23 0445)   acetaminophen tablet 650 mg (650 mg Oral Given 11/15/23 0445)     Medical Decision Making  Patient is a 68-year-old gentleman presents emergency room complaints of cough, body aches, increasing shortness of breath for the last 2 days.  History of nonischemic cardiomyopathy.  At this time, lungs are clear to auscultation.  Blood pressure is well controlled.  No lower extremity edema.  Findings likely related to viral upper respiratory infection, but component of pulmonary edema may be present.  Will obtain laboratory evaluation including cardiac workup BNP and chest x-ray.  Will continue to monitor.      Differential diagnosis:  Viral syndrome, upper respiratory infection, COVID-19, influenza, acute decompensated heart failure.    Amount and/or Complexity of Data Reviewed  External Data Reviewed: notes.     Details: 08/11/20 - Left Heart Cath Reviewed:  Impression   1. No obstructive coronary stenosis   2.  Nonischemic cardiomyopathy with reduced ejection fraction (25% with global hypokinesis)  3.  Possible prior arrhythmia   Labs: ordered. Decision-making details documented in ED Course.  Radiology: ordered and independent interpretation performed.    Risk  OTC drugs.  Prescription drug management.               ED Course as of 11/15/23 0513   Wed Nov 15, 2023   0439 WBC: 6.26 [MW]   0439  "Hemoglobin(!): 12.9 [MW]   0439 Hematocrit(!): 38.5 [MW]   0439 Platelet Count: 189 [MW]   0504 Lab reports positive RSV - requesting to change the "COVID/FLU" to a "COVID/FLU/RSV" in order to release the results. [MW]   0511 Discussed with patient regarding results.  No signs on physical examination fluid overload; bedside ultrasound performed, and ejection fraction does appear to be reduced, likely around the 30% ejection fraction range.  No pericardial effusion appreciated.  No B lines appreciated on pulmonary ultrasound at the bases.  Patient's symptoms likely related to a viral upper respiratory infection.  Discussed with the patient, even with regarding a steroid taper to help with symptoms.  Patient's RSV did result as positive.  Stable for discharge to home.  Patient will follow up with primary care physician.  ER precautions for any acute worsening. [MW]      ED Course User Index  [MW] Wilfredo Edwards MD                    Clinical Impression:   Final diagnoses:  [R06.00] Dyspnea  [J06.9] Viral upper respiratory infection (Primary)        ED Disposition Condition    Discharge Stable          ED Prescriptions       Medication Sig Dispense Start Date End Date Auth. Provider    cetirizine (ZYRTEC) 10 MG tablet Take 1 tablet (10 mg total) by mouth once daily. for 14 days 14 tablet 11/15/2023 11/29/2023 Wilfredo Edwards MD    benzonatate (TESSALON) 100 MG capsule Take 1 capsule (100 mg total) by mouth 3 (three) times daily as needed for Cough. 20 capsule 11/15/2023 12/6/2023 Wilfredo Edwards MD    methylPREDNISolone (MEDROL DOSEPACK) 4 mg tablet Take as directed on ely 1 each 11/15/2023 -- Wilfredo Edwards MD    fluticasone propionate (FLONASE) 50 mcg/actuation nasal spray 2 sprays (100 mcg total) by Each Nostril route once daily. 15 g 11/15/2023 -- Wilfredo Edwards MD          Follow-up Information       Follow up With Specialties Details Why Contact Info    Vern Mitchell. " III, MD Internal Medicine   [address]  [facility] [facility] [address]  [phone]      [facility] - Emergency Dept Emergency Medicine  As needed, If symptoms worsen [address]  [facility] [address]  [phone]             Wilfredo Edwards MD  11/15/23 0513

## 2024-06-26 ENCOUNTER — HOSPITAL ENCOUNTER (OUTPATIENT)
Dept: RADIOLOGY | Facility: HOSPITAL | Age: 69
Discharge: HOME | End: 2024-06-26
Attending: INTERNAL MEDICINE
Payer: MEDICARE

## 2024-06-26 DIAGNOSIS — R05.1 ACUTE COUGH: ICD-10-CM

## 2024-06-26 PROCEDURE — 71046 X-RAY EXAM CHEST 2 VIEWS: CPT | Mod: TC

## 2024-06-27 ENCOUNTER — HOSPITAL ENCOUNTER (INPATIENT)
Facility: HOSPITAL | Age: 69
LOS: 6 days | Discharge: ANOTHER HOSPITAL | DRG: 193 | End: 2024-07-03
Attending: STUDENT IN AN ORGANIZED HEALTH CARE EDUCATION/TRAINING PROGRAM | Admitting: INTERNAL MEDICINE
Payer: MEDICARE

## 2024-06-27 DIAGNOSIS — I47.10 SVT (SUPRAVENTRICULAR TACHYCARDIA): Primary | ICD-10-CM

## 2024-06-27 DIAGNOSIS — R00.0 TACHYCARDIA: ICD-10-CM

## 2024-06-27 DIAGNOSIS — R06.02 SHORTNESS OF BREATH: ICD-10-CM

## 2024-06-27 DIAGNOSIS — R06.02 SHORTNESS OF BREATH ON EXERTION: ICD-10-CM

## 2024-06-27 DIAGNOSIS — R07.9 CHEST PAIN: ICD-10-CM

## 2024-06-27 DIAGNOSIS — J18.9 COMMUNITY ACQUIRED PNEUMONIA, UNSPECIFIED LATERALITY: ICD-10-CM

## 2024-06-27 PROBLEM — I50.22 CHF (CONGESTIVE HEART FAILURE), NYHA CLASS II, CHRONIC, SYSTOLIC: Status: ACTIVE | Noted: 2024-06-27

## 2024-06-27 LAB
ALBUMIN SERPL-MCNC: 3.8 G/DL (ref 3.4–4.8)
ALBUMIN/GLOB SERPL: 0.9 RATIO (ref 1.1–2)
ALP SERPL-CCNC: 95 UNIT/L (ref 40–150)
ALT SERPL-CCNC: 22 UNIT/L (ref 0–55)
ANION GAP SERPL CALC-SCNC: 11 MEQ/L
APTT PPP: 33.1 SECONDS (ref 24.6–37.2)
AST SERPL-CCNC: 17 UNIT/L (ref 5–34)
BASOPHILS # BLD AUTO: 0.02 X10(3)/MCL
BASOPHILS NFR BLD AUTO: 0.1 %
BILIRUB SERPL-MCNC: 2.1 MG/DL
BILIRUB UR QL STRIP.AUTO: NEGATIVE
BNP BLD-MCNC: 1038.6 PG/ML
BUN SERPL-MCNC: 21 MG/DL (ref 8.4–25.7)
CALCIUM SERPL-MCNC: 9.9 MG/DL (ref 8.8–10)
CHLORIDE SERPL-SCNC: 100 MMOL/L (ref 98–107)
CLARITY UR: CLEAR
CO2 SERPL-SCNC: 21 MMOL/L (ref 23–31)
COLOR UR AUTO: YELLOW
CREAT SERPL-MCNC: 0.95 MG/DL (ref 0.73–1.18)
CREAT/UREA NIT SERPL: 22
D DIMER PPP IA.FEU-MCNC: 2.4 UG/ML FEU (ref 0–0.5)
EOSINOPHIL # BLD AUTO: 0 X10(3)/MCL (ref 0–0.9)
EOSINOPHIL NFR BLD AUTO: 0 %
ERYTHROCYTE [DISTWIDTH] IN BLOOD BY AUTOMATED COUNT: 13.8 % (ref 11.5–17)
FLUAV AG UPPER RESP QL IA.RAPID: NOT DETECTED
FLUBV AG UPPER RESP QL IA.RAPID: NOT DETECTED
GFR SERPLBLD CREATININE-BSD FMLA CKD-EPI: >60 ML/MIN/1.73/M2
GLOBULIN SER-MCNC: 4.1 GM/DL (ref 2.4–3.5)
GLUCOSE SERPL-MCNC: 132 MG/DL (ref 82–115)
GLUCOSE UR QL STRIP: NEGATIVE
HCT VFR BLD AUTO: 35.9 % (ref 42–52)
HGB BLD-MCNC: 12.1 G/DL (ref 14–18)
HGB UR QL STRIP: NEGATIVE
IMM GRANULOCYTES # BLD AUTO: 0.04 X10(3)/MCL (ref 0–0.04)
IMM GRANULOCYTES NFR BLD AUTO: 0.3 %
INR PPP: 1.2
KETONES UR QL STRIP: NEGATIVE
LACTATE SERPL-SCNC: 0.9 MMOL/L (ref 0.5–2.2)
LEUKOCYTE ESTERASE UR QL STRIP: NEGATIVE
LYMPHOCYTES # BLD AUTO: 1.19 X10(3)/MCL (ref 0.6–4.6)
LYMPHOCYTES NFR BLD AUTO: 8.1 %
MAGNESIUM SERPL-MCNC: 2.3 MG/DL (ref 1.6–2.6)
MCH RBC QN AUTO: 31.7 PG (ref 27–31)
MCHC RBC AUTO-ENTMCNC: 33.7 G/DL (ref 33–36)
MCV RBC AUTO: 94 FL (ref 80–94)
MONOCYTES # BLD AUTO: 1.21 X10(3)/MCL (ref 0.1–1.3)
MONOCYTES NFR BLD AUTO: 8.2 %
NEUTROPHILS # BLD AUTO: 12.24 X10(3)/MCL (ref 2.1–9.2)
NEUTROPHILS NFR BLD AUTO: 83.3 %
NITRITE UR QL STRIP: NEGATIVE
PH UR STRIP: 5 [PH]
PLATELET # BLD AUTO: 258 X10(3)/MCL (ref 130–400)
PMV BLD AUTO: 9.9 FL (ref 7.4–10.4)
POTASSIUM SERPL-SCNC: 4.1 MMOL/L (ref 3.5–5.1)
PROT SERPL-MCNC: 7.9 GM/DL (ref 5.8–7.6)
PROT UR QL STRIP: NEGATIVE
PROTHROMBIN TIME: 15.7 SECONDS (ref 12.5–14.5)
RBC # BLD AUTO: 3.82 X10(6)/MCL (ref 4.7–6.1)
SARS-COV-2 RNA RESP QL NAA+PROBE: NOT DETECTED
SODIUM SERPL-SCNC: 132 MMOL/L (ref 136–145)
SP GR UR STRIP.AUTO: <=1.005 (ref 1–1.03)
TROPONIN I SERPL-MCNC: 0.02 NG/ML (ref 0–0.04)
TSH SERPL-ACNC: 1.23 UIU/ML (ref 0.35–4.94)
UROBILINOGEN UR STRIP-ACNC: 0.2
WBC # BLD AUTO: 14.7 X10(3)/MCL (ref 4.5–11.5)

## 2024-06-27 PROCEDURE — 63600175 PHARM REV CODE 636 W HCPCS: Performed by: INTERNAL MEDICINE

## 2024-06-27 PROCEDURE — 85730 THROMBOPLASTIN TIME PARTIAL: CPT | Performed by: STUDENT IN AN ORGANIZED HEALTH CARE EDUCATION/TRAINING PROGRAM

## 2024-06-27 PROCEDURE — 87040 BLOOD CULTURE FOR BACTERIA: CPT | Performed by: STUDENT IN AN ORGANIZED HEALTH CARE EDUCATION/TRAINING PROGRAM

## 2024-06-27 PROCEDURE — 20000000 HC ICU ROOM

## 2024-06-27 PROCEDURE — 99291 CRITICAL CARE FIRST HOUR: CPT | Mod: 25

## 2024-06-27 PROCEDURE — 81003 URINALYSIS AUTO W/O SCOPE: CPT | Performed by: STUDENT IN AN ORGANIZED HEALTH CARE EDUCATION/TRAINING PROGRAM

## 2024-06-27 PROCEDURE — 83880 ASSAY OF NATRIURETIC PEPTIDE: CPT | Performed by: STUDENT IN AN ORGANIZED HEALTH CARE EDUCATION/TRAINING PROGRAM

## 2024-06-27 PROCEDURE — 0240U COVID/FLU A&B PCR: CPT | Performed by: STUDENT IN AN ORGANIZED HEALTH CARE EDUCATION/TRAINING PROGRAM

## 2024-06-27 PROCEDURE — 85025 COMPLETE CBC W/AUTO DIFF WBC: CPT | Performed by: STUDENT IN AN ORGANIZED HEALTH CARE EDUCATION/TRAINING PROGRAM

## 2024-06-27 PROCEDURE — 25000003 PHARM REV CODE 250: Performed by: STUDENT IN AN ORGANIZED HEALTH CARE EDUCATION/TRAINING PROGRAM

## 2024-06-27 PROCEDURE — 93005 ELECTROCARDIOGRAM TRACING: CPT

## 2024-06-27 PROCEDURE — 83605 ASSAY OF LACTIC ACID: CPT | Performed by: STUDENT IN AN ORGANIZED HEALTH CARE EDUCATION/TRAINING PROGRAM

## 2024-06-27 PROCEDURE — 84443 ASSAY THYROID STIM HORMONE: CPT | Performed by: STUDENT IN AN ORGANIZED HEALTH CARE EDUCATION/TRAINING PROGRAM

## 2024-06-27 PROCEDURE — 84484 ASSAY OF TROPONIN QUANT: CPT | Performed by: STUDENT IN AN ORGANIZED HEALTH CARE EDUCATION/TRAINING PROGRAM

## 2024-06-27 PROCEDURE — 85610 PROTHROMBIN TIME: CPT | Performed by: STUDENT IN AN ORGANIZED HEALTH CARE EDUCATION/TRAINING PROGRAM

## 2024-06-27 PROCEDURE — 83735 ASSAY OF MAGNESIUM: CPT | Performed by: STUDENT IN AN ORGANIZED HEALTH CARE EDUCATION/TRAINING PROGRAM

## 2024-06-27 PROCEDURE — 96361 HYDRATE IV INFUSION ADD-ON: CPT

## 2024-06-27 PROCEDURE — 96375 TX/PRO/DX INJ NEW DRUG ADDON: CPT

## 2024-06-27 PROCEDURE — 85379 FIBRIN DEGRADATION QUANT: CPT | Performed by: STUDENT IN AN ORGANIZED HEALTH CARE EDUCATION/TRAINING PROGRAM

## 2024-06-27 PROCEDURE — 63600175 PHARM REV CODE 636 W HCPCS: Performed by: STUDENT IN AN ORGANIZED HEALTH CARE EDUCATION/TRAINING PROGRAM

## 2024-06-27 PROCEDURE — 25500020 PHARM REV CODE 255: Performed by: STUDENT IN AN ORGANIZED HEALTH CARE EDUCATION/TRAINING PROGRAM

## 2024-06-27 PROCEDURE — 96365 THER/PROPH/DIAG IV INF INIT: CPT

## 2024-06-27 PROCEDURE — 80053 COMPREHEN METABOLIC PANEL: CPT | Performed by: STUDENT IN AN ORGANIZED HEALTH CARE EDUCATION/TRAINING PROGRAM

## 2024-06-27 PROCEDURE — 93010 ELECTROCARDIOGRAM REPORT: CPT | Mod: ,,, | Performed by: INTERNAL MEDICINE

## 2024-06-27 RX ORDER — BISACODYL 10 MG/1
10 SUPPOSITORY RECTAL DAILY PRN
Status: DISCONTINUED | OUTPATIENT
Start: 2024-06-28 | End: 2024-07-03 | Stop reason: HOSPADM

## 2024-06-27 RX ORDER — ATORVASTATIN CALCIUM 10 MG/1
10 TABLET, FILM COATED ORAL NIGHTLY
COMMUNITY
End: 2024-07-03

## 2024-06-27 RX ORDER — GABAPENTIN 300 MG/1
300 CAPSULE ORAL 2 TIMES DAILY
Status: DISCONTINUED | OUTPATIENT
Start: 2024-06-28 | End: 2024-06-28

## 2024-06-27 RX ORDER — LISINOPRIL 2.5 MG/1
2.5 TABLET ORAL EVERY MORNING
Status: DISCONTINUED | OUTPATIENT
Start: 2024-06-28 | End: 2024-07-03 | Stop reason: HOSPADM

## 2024-06-27 RX ORDER — SPIRONOLACTONE 25 MG/1
25 TABLET ORAL DAILY
COMMUNITY
End: 2024-07-03

## 2024-06-27 RX ORDER — AMIODARONE HYDROCHLORIDE 150 MG/3ML
150 INJECTION, SOLUTION INTRAVENOUS
Status: COMPLETED | OUTPATIENT
Start: 2024-06-27 | End: 2024-06-27

## 2024-06-27 RX ORDER — SODIUM CHLORIDE 0.9 % (FLUSH) 0.9 %
10 SYRINGE (ML) INJECTION EVERY 12 HOURS PRN
Status: DISCONTINUED | OUTPATIENT
Start: 2024-06-28 | End: 2024-07-03 | Stop reason: HOSPADM

## 2024-06-27 RX ORDER — TALC
6 POWDER (GRAM) TOPICAL NIGHTLY PRN
Status: DISCONTINUED | OUTPATIENT
Start: 2024-06-27 | End: 2024-07-03 | Stop reason: HOSPADM

## 2024-06-27 RX ORDER — SODIUM CHLORIDE 0.9 % (FLUSH) 0.9 %
10 SYRINGE (ML) INJECTION
Status: DISCONTINUED | OUTPATIENT
Start: 2024-06-27 | End: 2024-07-03 | Stop reason: HOSPADM

## 2024-06-27 RX ORDER — ONDANSETRON HYDROCHLORIDE 2 MG/ML
4 INJECTION, SOLUTION INTRAVENOUS EVERY 8 HOURS PRN
Status: DISCONTINUED | OUTPATIENT
Start: 2024-06-28 | End: 2024-07-03 | Stop reason: HOSPADM

## 2024-06-27 RX ORDER — METOPROLOL TARTRATE 1 MG/ML
5 INJECTION, SOLUTION INTRAVENOUS
Status: COMPLETED | OUTPATIENT
Start: 2024-06-27 | End: 2024-06-27

## 2024-06-27 RX ORDER — ENOXAPARIN SODIUM 100 MG/ML
40 INJECTION SUBCUTANEOUS EVERY 24 HOURS
Status: DISCONTINUED | OUTPATIENT
Start: 2024-06-28 | End: 2024-07-03 | Stop reason: HOSPADM

## 2024-06-27 RX ORDER — ATORVASTATIN CALCIUM 10 MG/1
10 TABLET, FILM COATED ORAL NIGHTLY
Status: DISCONTINUED | OUTPATIENT
Start: 2024-06-28 | End: 2024-07-03 | Stop reason: HOSPADM

## 2024-06-27 RX ORDER — LISINOPRIL 2.5 MG/1
2.5 TABLET ORAL DAILY
COMMUNITY
End: 2024-07-03

## 2024-06-27 RX ORDER — SODIUM CHLORIDE, SODIUM LACTATE, POTASSIUM CHLORIDE, CALCIUM CHLORIDE 600; 310; 30; 20 MG/100ML; MG/100ML; MG/100ML; MG/100ML
INJECTION, SOLUTION INTRAVENOUS CONTINUOUS
Status: DISCONTINUED | OUTPATIENT
Start: 2024-06-27 | End: 2024-06-29

## 2024-06-27 RX ORDER — METOPROLOL SUCCINATE 50 MG/1
25 TABLET, EXTENDED RELEASE ORAL DAILY
COMMUNITY
End: 2024-07-03

## 2024-06-27 RX ORDER — POLYETHYLENE GLYCOL 3350 17 G/17G
17 POWDER, FOR SOLUTION ORAL DAILY
Status: DISCONTINUED | OUTPATIENT
Start: 2024-06-28 | End: 2024-06-28

## 2024-06-27 RX ORDER — ACETYLCYSTEINE 100 MG/ML
4 SOLUTION ORAL; RESPIRATORY (INHALATION)
Status: DISCONTINUED | OUTPATIENT
Start: 2024-06-28 | End: 2024-06-28

## 2024-06-27 RX ORDER — IPRATROPIUM BROMIDE AND ALBUTEROL SULFATE 2.5; .5 MG/3ML; MG/3ML
3 SOLUTION RESPIRATORY (INHALATION)
Status: DISCONTINUED | OUTPATIENT
Start: 2024-06-28 | End: 2024-06-28

## 2024-06-27 RX ORDER — MORPHINE SULFATE 4 MG/ML
1 INJECTION, SOLUTION INTRAMUSCULAR; INTRAVENOUS EVERY 4 HOURS PRN
Status: DISCONTINUED | OUTPATIENT
Start: 2024-06-27 | End: 2024-07-03 | Stop reason: HOSPADM

## 2024-06-27 RX ORDER — FUROSEMIDE 40 MG/1
40 TABLET ORAL DAILY
COMMUNITY
Start: 2024-04-10 | End: 2024-07-03

## 2024-06-27 RX ORDER — METOPROLOL SUCCINATE 25 MG/1
25 TABLET, EXTENDED RELEASE ORAL DAILY
Status: DISCONTINUED | OUTPATIENT
Start: 2024-06-28 | End: 2024-07-03 | Stop reason: HOSPADM

## 2024-06-27 RX ADMIN — AMIODARONE HYDROCHLORIDE 1 MG/MIN: 1.8 INJECTION, SOLUTION INTRAVENOUS at 10:06

## 2024-06-27 RX ADMIN — AMIODARONE HYDROCHLORIDE 1 MG/MIN: 1.8 INJECTION, SOLUTION INTRAVENOUS at 05:06

## 2024-06-27 RX ADMIN — METOPROLOL TARTRATE 5 MG: 1 INJECTION, SOLUTION INTRAVENOUS at 02:06

## 2024-06-27 RX ADMIN — SODIUM CHLORIDE, POTASSIUM CHLORIDE, SODIUM LACTATE AND CALCIUM CHLORIDE: 600; 310; 30; 20 INJECTION, SOLUTION INTRAVENOUS at 07:06

## 2024-06-27 RX ADMIN — AMIODARONE HYDROCHLORIDE 150 MG: 50 INJECTION, SOLUTION INTRAVENOUS at 05:06

## 2024-06-27 RX ADMIN — CEFTRIAXONE SODIUM 1 G: 1 INJECTION, POWDER, FOR SOLUTION INTRAMUSCULAR; INTRAVENOUS at 04:06

## 2024-06-27 RX ADMIN — IOPAMIDOL 100 ML: 755 INJECTION, SOLUTION INTRAVENOUS at 03:06

## 2024-06-27 RX ADMIN — SODIUM CHLORIDE, POTASSIUM CHLORIDE, SODIUM LACTATE AND CALCIUM CHLORIDE 500 ML: 600; 310; 30; 20 INJECTION, SOLUTION INTRAVENOUS at 03:06

## 2024-06-27 RX ADMIN — DOXYCYCLINE 100 MG: 100 INJECTION, POWDER, LYOPHILIZED, FOR SOLUTION INTRAVENOUS at 05:06

## 2024-06-27 RX ADMIN — MORPHINE SULFATE 1 MG: 4 INJECTION INTRAVENOUS at 10:06

## 2024-06-27 RX ADMIN — SODIUM CHLORIDE, POTASSIUM CHLORIDE, SODIUM LACTATE AND CALCIUM CHLORIDE 1000 ML: 600; 310; 30; 20 INJECTION, SOLUTION INTRAVENOUS at 02:06

## 2024-06-27 NOTE — ED PROVIDER NOTES
Encounter Date: 6/27/2024       History     Chief Complaint   Patient presents with    Shortness of Breath     C/o SOB x 2-3 days and Rt upper back(lung) pain. HR 150s. States h/o tachycardia/SVT.     HPI    69-year-old male with a past medical history of hypertension, CHF, GERD and history of SVT presents emergency department for shortness of breath and right-sided lung pain.  Patient states it has been going on for last 2-3 days.  States it is heart rate over this timeframe has been going up and down upwards to the 150s.  States it after a few minutes up to an hour the heart rate improved.  He feels like he can not breathe.  Denies any swelling.  States that this shortness of breath no worse upon lying down.  No chest pain.    Review of patient's allergies indicates:  No Known Allergies  Past Medical History:   Diagnosis Date    CHF (congestive heart failure)     GERD (gastroesophageal reflux disease)     High cholesterol     HTN (hypertension)      Past Surgical History:   Procedure Laterality Date    COLONOSCOPY  12/28/2016    COLONOSCOPY N/A 07/28/2022    Procedure: COLONOSCOPY;  Surgeon: Vern Mitchell III, MD;  Location: [facility];  Service: Endoscopy;  Laterality: N/A;  Suboptimal prep. Diverticulosis without perforation.    FACIAL FRACTURE SURGERY      HERNIA REPAIR      HIP REPLACEMENT ARTHROPLASTY Left     KNEE ARTHROSCOPY Right     WRIST SURGERY Right      Family History   Problem Relation Name Age of Onset    Hypertension Mother      Hyperlipidemia Mother      Diabetes Mother      Breast cancer Mother      Pneumonia Father      Colon cancer Brother       Social History     Tobacco Use    Smoking status: Former    Smokeless tobacco: Never   Substance Use Topics    Alcohol use: Yes     Alcohol/week: 7.0 standard drinks of alcohol     Types: 7 Cans of beer per week    Drug use: Never     Review of Systems   Constitutional:  Negative for fever.   HENT:  Negative for sore throat.    Respiratory:  Positive  for shortness of breath. Negative for cough.    Cardiovascular:  Positive for palpitations. Negative for chest pain.   Gastrointestinal:  Negative for abdominal pain, constipation, diarrhea, nausea and vomiting.   Genitourinary:  Negative for dysuria.   Musculoskeletal:  Negative for back pain.   Skin:  Negative for rash.   Neurological:  Negative for weakness and headaches.   Hematological:  Does not bruise/bleed easily.   All other systems reviewed and are negative.      Physical Exam     Initial Vitals [06/27/24 1428]   BP Pulse Resp Temp SpO2   117/77 (!) 151 20 98.1 °F (36.7 °C) 96 %      MAP       --         Physical Exam    Nursing note and vitals reviewed.  Constitutional: He appears well-developed and well-nourished. No distress.   Cardiovascular:  Regular rhythm.   Tachycardia present.         Heart rate in the 130s to 150s range   Pulmonary/Chest: Breath sounds normal. No respiratory distress.   Abdominal: Abdomen is soft. There is no abdominal tenderness.   Musculoskeletal:         General: No tenderness. Normal range of motion.     Neurological: He is alert and oriented to person, place, and time.   Skin: Skin is warm. Capillary refill takes less than 2 seconds.         ED Course   Critical Care    Date/Time: 6/27/2024 5:30 PM    Performed by: Silvio Hatch MD  Authorized by: Vern Mitchell III, MD  Direct patient critical care time: 45 minutes  Additional history critical care time: 2 minutes  Ordering / reviewing critical care time: 5 minutes  Documentation critical care time: 5 minutes  Consulting other physicians critical care time: 10 minutes  Total critical care time (exclusive of procedural time) : 67 minutes  Critical care time was exclusive of separately billable procedures and treating other patients.  Critical care was necessary to treat or prevent imminent or life-threatening deterioration of the following conditions: cardiac failure.  Critical care was time spent personally by me  on the following activities: development of treatment plan with patient or surrogate, discussions with consultants, interpretation of cardiac output measurements, evaluation of patient's response to treatment, examination of patient, obtaining history from patient or surrogate, ordering and performing treatments and interventions, ordering and review of laboratory studies, ordering and review of radiographic studies, pulse oximetry, re-evaluation of patient's condition and review of old charts.        Labs Reviewed   B-TYPE NATRIURETIC PEPTIDE - Abnormal; Notable for the following components:       Result Value    Natriuretic Peptide 1,038.6 (*)     All other components within normal limits   COMPREHENSIVE METABOLIC PANEL - Abnormal; Notable for the following components:    Sodium 132 (*)     CO2 21 (*)     Glucose 132 (*)     Protein Total 7.9 (*)     Globulin 4.1 (*)     Albumin/Globulin Ratio 0.9 (*)     Bilirubin Total 2.1 (*)     All other components within normal limits   PROTIME-INR - Abnormal; Notable for the following components:    PT 15.7 (*)     All other components within normal limits   CBC WITH DIFFERENTIAL - Abnormal; Notable for the following components:    WBC 14.70 (*)     RBC 3.82 (*)     Hgb 12.1 (*)     Hct 35.9 (*)     MCH 31.7 (*)     Neut # 12.24 (*)     All other components within normal limits   D DIMER, QUANTITATIVE - Abnormal; Notable for the following components:    D-Dimer 2.40 (*)     All other components within normal limits   APTT - Normal   COVID/FLU A&B PCR - Normal    Narrative:     The Xpert Xpress SARS-CoV-2/FLU/RSV plus is a rapid, multiplexed real-time PCR test intended for the simultaneous qualitative detection and differentiation of SARS-CoV-2, Influenza A, Influenza B, and respiratory syncytial virus (RSV) viral RNA in either nasopharyngeal swab or nasal swab specimens.         TROPONIN I - Normal   MAGNESIUM - Normal   URINALYSIS, REFLEX TO URINE CULTURE - Normal   TSH -  Normal   LACTIC ACID, PLASMA - Normal   BLOOD CULTURE OLG   BLOOD CULTURE OLG   CBC W/ AUTO DIFFERENTIAL    Narrative:     The following orders were created for panel order CBC auto differential.  Procedure                               Abnormality         Status                     ---------                               -----------         ------                     CBC with Differential[[id number]]       Abnormal            Final result                 Please view results for these tests on the individual orders.     EKG Readings: (Independently Interpreted)   Initial Reading: No STEMI. Rhythm: Supraventricular Tachycardia (SVT). Heart Rate: 147. Ectopy: No Ectopy. Conduction: Normal. ST Segments: Non-Specific ST Segment Depression. T Waves: Normal. Axis: Left Axis Deviation. Clinical Impression: Supraventricular Tachycardia (SVT)     ECG Results              EKG 12-lead (In process)        Collection Time Result Time QRS Duration OHS QTC Calculation    06/27/24 14:19:30 06/27/24 16:55:17 98 453                     In process by Interface, Lab In [facility] (06/27/24 16:55:26)                   Narrative:    Test Reason : R00.0,    Vent. Rate : 147 BPM     Atrial Rate : 030 BPM     P-R Int : 000 ms          QRS Dur : 098 ms      QT Int : 290 ms       P-R-T Axes : 000 -47 041 degrees     QTc Int : 453 ms    Supraventricular tachycardia  Left axis deviation  Nonspecific ST abnormality  Abnormal ECG  When compared with ECG of 15-NOV-2023 04:20,  CA interval has decreased  Vent. rate has increased BY  72 BPM  T wave inversion no longer evident in Lateral leads    Referred By: [email or web address]   SELF           Confirmed By:                                   Imaging Results              CTA Chest Non-Coronary (PE Studies) (Final result)  Result time 06/27/24 15:57:36      Final result by Isidoro Yates MD (06/27/24 15:57:36)                   Impression:      1. No significant filling defects noted to the pulmonary arteries  to indicate pulmonary embolus  2. Patchy hazy opacification of the majority of the right lower lobe suspicious for infiltrate and atelectasis with small right posterior basilar pleural reaction  3. Mediastinal and hilar lymphadenopathy with the right side greater than the left  4. Cardiomegaly  5. Thoracic spondylosis  6. Findings and other details as above      Electronically signed by: Isidoro Yates  Date:    06/27/2024  Time:    15:57               Narrative:    EXAMINATION:  CT ANGIOGRAM CHEST WITH IV CONTRAST:    CLINICAL HISTORY:  High clinical suspicion for pulmonary embolus    TECHNIQUE:  PATIENT RADIATION DOSE: DLP(mGycm) 348    As per PQRS measures, all CT scans at this facility used dose modulation, iterative reconstruction, and/or weight based dose adjustment when appropriate to reduce radiation dose to as low as reasonably achievable.    COMPARISON:  11/08/2016    FINDINGS:  Serial axial images were obtained of the chest with the administration of IV contrast. Additional coronal and sagittal mip reconstructions as well as 3-D rotational spin reconstructions were performed. NASCET criteria was utilized. Degenerative changes are noted to the thoracolumbar spine.  There is mild anterior wedge configuration at lower thoracic vertebral bodies.  Thyroid lobes are relatively symmetric in size.  Atherosclerosis is seen within the aorta and branching vessels. Multiple enlarged lymph nodes seen within the mediastinum and shaggy bilaterally with the right side greater than the left.  A small right pleural reaction is present.  No axillary lymphadenopathy is identified.  Contrast is identified within the heart and pulmonary vessels.  No significant filling defects are noted to the pulmonary arteries to indicate pulmonary embolus.  The airways are grossly patent.  Patchy hazy opacities are evident throughout the majority of the right lower lobe.  Atelectasis and/or scarring is evident the left lower lobe.                                        X-Ray Chest 1 View (Final result)  Result time 06/27/24 16:22:10      Final result by Isidoro Yates MD (06/27/24 16:22:10)                   Impression:      1. Cardiomegaly  2. Elevated right hemidiaphragm with suspect right basilar infiltrate and or atelectasis  3. Thoracic spondylosis and scoliosis      Electronically signed by: Isidoro Yates  Date:    06/27/2024  Time:    16:22               Narrative:    EXAMINATION:  XR CHEST 1 VIEW    CLINICAL HISTORY:  , Shortness of breath.    COMPARISON:  06/26/2024    FINDINGS:  An AP view or more reveals  the heart to be enlarged. The trachea is midline. There is mild elevation of the right hemidiaphragm. Mild patchy hazy increased is evident the right lung base.  Degenerative changes and curvature are noted to the thoracic spine.                                       Medications   cefTRIAXone (Rocephin) 1 g in D5W 100 mL IVPB (MB+) (0 g Intravenous Stopped 6/27/24 1718)   doxycycline 100 mg in D5W 100 mL IVPB (MB+) (100 mg Intravenous New Bag 6/27/24 1730)   amiodarone 360 mg/200 mL (1.8 mg/mL) infusion (1 mg/min Intravenous Verify Only 6/27/24 1719)   sodium chloride 0.9% flush 10 mL (has no administration in time range)   melatonin tablet 6 mg (has no administration in time range)   lactated ringers infusion (has no administration in time range)   lactated ringers bolus 1,000 mL (0 mLs Intravenous Stopped 6/27/24 1530)   metoprolol injection 5 mg (5 mg Intravenous Given 6/27/24 1447)   iopamidoL (ISOVUE-370) injection 100 mL (100 mLs Intravenous Given 6/27/24 1516)   lactated ringers bolus 500 mL (0 mLs Intravenous Stopped 6/27/24 1648)   amiodarone injection 150 mg (150 mg Intravenous Given 6/27/24 1708)     Medical Decision Making  Problems Addressed:  Community acquired pneumonia, unspecified laterality: acute illness or injury  SVT (supraventricular tachycardia): acute illness or injury that poses a threat to life or bodily  functions    Amount and/or Complexity of Data Reviewed  Labs: ordered. Decision-making details documented in ED Course.  Radiology: ordered.  ECG/medicine tests: ordered and independent interpretation performed.    Risk  OTC drugs.  Prescription drug management.  Decision regarding hospitalization.               ED Course as of 06/27/24 1730   Thu Jun 27, 2024   1433 WBC(!): 14.70 [BS]   1433 Hemoglobin(!): 12.1 [BS]   1433 Hematocrit(!): 35.9 [BS]   1433 Platelet Count: 258 [BS]   1440 Patient received some fluids.  He is still taking in the 130s 140s range.  He does not seem to be in a CHF exacerbation.  Will give him 5 of IV metoprolol and see if this convert him back to normal sinus rhythm out of SVT [BS]   1459 Heart rate has improved to the 120s [BS]   1459 D-Dimer(!): 2.40  CT PE study ordered [BS]   1500 Troponin I: 0.017 [BS]   1501 BNP(!): 1,038.6  Patient does not have crackles on his exam.  We will continue the fluid bolus.  Possible PE.  If PE study returns negative will give Lasix. [BS]   1623 Patient has no signs of overload including the CT scan of the lungs.  Possibly elevated BNP from him being in SVT for a prolonged amount of time as he states he has been having this symptoms for last 3 days. [BS]   1728 Dr. France on-call for Dr. Mitchell agrees with admission.  Keep patient on tele. [BS]      ED Course User Index  [BS] Silvio Hatch MD                             Clinical Impression:  Final diagnoses:  [R00.0] Tachycardia  [R06.02] Shortness of breath  [I47.10] SVT (supraventricular tachycardia) (Primary)  [J18.9] Community acquired pneumonia, unspecified laterality          ED Disposition Condition    Admit Stable                Silvio Hatch MD  06/27/24 1731

## 2024-06-27 NOTE — Clinical Note
Diagnosis: Tachycardia [722962]   Future Attending Provider: SNOW MOTT III [[id number]]   Reason for IP Medical Treatment  (Clinical interventions that can only be accomplished in the IP setting? ) :: Heart rate controlled, pneumonia   I certify that Inpatient services for greater than or equal to 2 midnights are medically necessary:: Yes   Plans for Post-Acute care--if anticipated (pick the single best option):: A. No post acute care anticipated at this time

## 2024-06-28 PROBLEM — I50.23 ACUTE ON CHRONIC SYSTOLIC CONGESTIVE HEART FAILURE, NYHA CLASS 3: Status: ACTIVE | Noted: 2024-06-27

## 2024-06-28 LAB
ALBUMIN SERPL-MCNC: 3.1 G/DL (ref 3.4–4.8)
ALBUMIN/GLOB SERPL: 0.8 RATIO (ref 1.1–2)
ALP SERPL-CCNC: 88 UNIT/L (ref 40–150)
ALT SERPL-CCNC: 20 UNIT/L (ref 0–55)
ANION GAP SERPL CALC-SCNC: 8 MEQ/L
AST SERPL-CCNC: 20 UNIT/L (ref 5–34)
AV PEAK GRADIENT: 4 MMHG
AV VALVE AREA BY VELOCITY RATIO: 1.7 CM²
AV VELOCITY RATIO: 0.51
B PERT.PT PRMT NPH QL NAA+NON-PROBE: NOT DETECTED
BASOPHILS # BLD AUTO: 0.02 X10(3)/MCL
BASOPHILS NFR BLD AUTO: 0.2 %
BILIRUB SERPL-MCNC: 0.7 MG/DL
BSA FOR ECHO PROCEDURE: 2.1 M2
BUN SERPL-MCNC: 24 MG/DL (ref 8.4–25.7)
C PNEUM DNA NPH QL NAA+NON-PROBE: NOT DETECTED
CALCIUM SERPL-MCNC: 9 MG/DL (ref 8.8–10)
CHLORIDE SERPL-SCNC: 101 MMOL/L (ref 98–107)
CO2 SERPL-SCNC: 20 MMOL/L (ref 23–31)
CREAT SERPL-MCNC: 0.94 MG/DL (ref 0.73–1.18)
CREAT/UREA NIT SERPL: 26
CV ECHO LV RWT: 0.43 CM
DOP CALC AO PEAK VEL: 0.97 M/S
DOP CALC LVOT AREA: 3.4 CM2
DOP CALC LVOT DIAMETER: 2.07 CM
DOP CALC LVOT PEAK VEL: 0.49 M/S
DOP CALC MV VTI: 25 CM
E WAVE DECELERATION TIME: 227.6 MSEC
E/A RATIO: 2.66
ECHO LV POSTERIOR WALL: 1.29 CM (ref 0.6–1.1)
EOSINOPHIL # BLD AUTO: 0.01 X10(3)/MCL (ref 0–0.9)
EOSINOPHIL NFR BLD AUTO: 0.1 %
ERYTHROCYTE [DISTWIDTH] IN BLOOD BY AUTOMATED COUNT: 13.9 % (ref 11.5–17)
FRACTIONAL SHORTENING: 6 % (ref 28–44)
GFR SERPLBLD CREATININE-BSD FMLA CKD-EPI: >60 ML/MIN/1.73/M2
GLOBULIN SER-MCNC: 3.7 GM/DL (ref 2.4–3.5)
GLUCOSE SERPL-MCNC: 202 MG/DL (ref 82–115)
HADV DNA NPH QL NAA+NON-PROBE: NOT DETECTED
HCOV 229E RNA NPH QL NAA+NON-PROBE: NOT DETECTED
HCOV HKU1 RNA NPH QL NAA+NON-PROBE: NOT DETECTED
HCOV NL63 RNA NPH QL NAA+NON-PROBE: NOT DETECTED
HCOV OC43 RNA NPH QL NAA+NON-PROBE: NOT DETECTED
HCT VFR BLD AUTO: 31.8 % (ref 42–52)
HGB BLD-MCNC: 10.5 G/DL (ref 14–18)
HMPV RNA NPH QL NAA+NON-PROBE: NOT DETECTED
HPIV1 RNA NPH QL NAA+NON-PROBE: NOT DETECTED
HPIV2 RNA NPH QL NAA+NON-PROBE: NOT DETECTED
HPIV3 RNA NPH QL NAA+NON-PROBE: NOT DETECTED
HPIV4 RNA NPH QL NAA+NON-PROBE: NOT DETECTED
IMM GRANULOCYTES # BLD AUTO: 0.07 X10(3)/MCL (ref 0–0.04)
IMM GRANULOCYTES NFR BLD AUTO: 0.5 %
INTERVENTRICULAR SEPTUM: 1.12 CM (ref 0.6–1.1)
LEFT ATRIUM SIZE: 0 CM
LEFT INTERNAL DIMENSION IN SYSTOLE: 5.68 CM (ref 2.1–4)
LEFT VENTRICLE DIASTOLIC VOLUME INDEX: 87.92 ML/M2
LEFT VENTRICLE DIASTOLIC VOLUME: 181.11 ML
LEFT VENTRICLE MASS INDEX: 154 G/M2
LEFT VENTRICLE SYSTOLIC VOLUME INDEX: 77 ML/M2
LEFT VENTRICLE SYSTOLIC VOLUME: 158.64 ML
LEFT VENTRICULAR INTERNAL DIMENSION IN DIASTOLE: 6.02 CM (ref 3.5–6)
LEFT VENTRICULAR MASS: 317.52 G
LVED V (TEICH): 181.11 ML
LVES V (TEICH): 158.64 ML
LYMPHOCYTES # BLD AUTO: 1.18 X10(3)/MCL (ref 0.6–4.6)
LYMPHOCYTES NFR BLD AUTO: 8.9 %
M PNEUMO DNA NPH QL NAA+NON-PROBE: NOT DETECTED
MAGNESIUM SERPL-MCNC: 2.3 MG/DL (ref 1.6–2.6)
MCH RBC QN AUTO: 31.7 PG (ref 27–31)
MCHC RBC AUTO-ENTMCNC: 33 G/DL (ref 33–36)
MCV RBC AUTO: 96.1 FL (ref 80–94)
MONOCYTES # BLD AUTO: 0.97 X10(3)/MCL (ref 0.1–1.3)
MONOCYTES NFR BLD AUTO: 7.3 %
MV MEAN GRADIENT: 1 MMHG
MV PEAK A VEL: 0.32 M/S
MV PEAK E VEL: 0.85 M/S
MV PEAK GRADIENT: 4 MMHG
MV STENOSIS PRESSURE HALF TIME: 66.01 MS
MV VALVE AREA P 1/2 METHOD: 3.33 CM2
NEUTROPHILS # BLD AUTO: 11.07 X10(3)/MCL (ref 2.1–9.2)
NEUTROPHILS NFR BLD AUTO: 83 %
OHS CV RV/LV RATIO: 0.56 CM
PHOSPHATE SERPL-MCNC: 2 MG/DL (ref 2.3–4.7)
PISA MRMAX VEL: 2.9 M/S
PISA TR MAX VEL: 1.29 M/S
PLATELET # BLD AUTO: 213 X10(3)/MCL (ref 130–400)
PMV BLD AUTO: 10.6 FL (ref 7.4–10.4)
POTASSIUM SERPL-SCNC: 4.1 MMOL/L (ref 3.5–5.1)
PROT SERPL-MCNC: 6.8 GM/DL (ref 5.8–7.6)
PV PEAK GRADIENT: 1 MMHG
PV PEAK VELOCITY: 0.48 M/S
RBC # BLD AUTO: 3.31 X10(6)/MCL (ref 4.7–6.1)
RIGHT VENTRICULAR END-DIASTOLIC DIMENSION: 3.35 CM
RSV RNA NPH QL NAA+NON-PROBE: NOT DETECTED
RV+EV RNA NPH QL NAA+NON-PROBE: NOT DETECTED
SODIUM SERPL-SCNC: 129 MMOL/L (ref 136–145)
TR MAX PG: 7 MMHG
WBC # BLD AUTO: 13.32 X10(3)/MCL (ref 4.5–11.5)
Z-SCORE OF LEFT VENTRICULAR DIMENSION IN END DIASTOLE: -0.33
Z-SCORE OF LEFT VENTRICULAR DIMENSION IN END SYSTOLE: 3.16

## 2024-06-28 PROCEDURE — 36415 COLL VENOUS BLD VENIPUNCTURE: CPT | Performed by: INTERNAL MEDICINE

## 2024-06-28 PROCEDURE — 80053 COMPREHEN METABOLIC PANEL: CPT | Performed by: INTERNAL MEDICINE

## 2024-06-28 PROCEDURE — 94640 AIRWAY INHALATION TREATMENT: CPT

## 2024-06-28 PROCEDURE — 84100 ASSAY OF PHOSPHORUS: CPT | Performed by: INTERNAL MEDICINE

## 2024-06-28 PROCEDURE — 87798 DETECT AGENT NOS DNA AMP: CPT | Performed by: INTERNAL MEDICINE

## 2024-06-28 PROCEDURE — 83735 ASSAY OF MAGNESIUM: CPT | Performed by: INTERNAL MEDICINE

## 2024-06-28 PROCEDURE — 63600175 PHARM REV CODE 636 W HCPCS: Performed by: INTERNAL MEDICINE

## 2024-06-28 PROCEDURE — 25000003 PHARM REV CODE 250: Performed by: INTERNAL MEDICINE

## 2024-06-28 PROCEDURE — 25000242 PHARM REV CODE 250 ALT 637 W/ HCPCS: Performed by: INTERNAL MEDICINE

## 2024-06-28 PROCEDURE — 85025 COMPLETE CBC W/AUTO DIFF WBC: CPT | Performed by: INTERNAL MEDICINE

## 2024-06-28 PROCEDURE — 20000000 HC ICU ROOM

## 2024-06-28 PROCEDURE — 87899 AGENT NOS ASSAY W/OPTIC: CPT | Performed by: INTERNAL MEDICINE

## 2024-06-28 PROCEDURE — 94761 N-INVAS EAR/PLS OXIMETRY MLT: CPT

## 2024-06-28 RX ORDER — HYDROCODONE BITARTRATE AND ACETAMINOPHEN 7.5; 325 MG/15ML; MG/15ML
15 SOLUTION ORAL EVERY 6 HOURS PRN
Status: DISCONTINUED | OUTPATIENT
Start: 2024-06-28 | End: 2024-07-02

## 2024-06-28 RX ORDER — ACETYLCYSTEINE 100 MG/ML
4 SOLUTION ORAL; RESPIRATORY (INHALATION) EVERY 6 HOURS PRN
Status: DISCONTINUED | OUTPATIENT
Start: 2024-06-28 | End: 2024-06-28

## 2024-06-28 RX ORDER — PANTOPRAZOLE SODIUM 40 MG/1
40 TABLET, DELAYED RELEASE ORAL DAILY
Status: DISCONTINUED | OUTPATIENT
Start: 2024-06-29 | End: 2024-07-03 | Stop reason: HOSPADM

## 2024-06-28 RX ORDER — DOCUSATE SODIUM 50 MG/5ML
50 LIQUID ORAL DAILY
Status: DISCONTINUED | OUTPATIENT
Start: 2024-06-29 | End: 2024-07-03 | Stop reason: HOSPADM

## 2024-06-28 RX ORDER — MUPIROCIN 20 MG/G
OINTMENT TOPICAL 2 TIMES DAILY
Status: DISPENSED | OUTPATIENT
Start: 2024-06-28 | End: 2024-07-03

## 2024-06-28 RX ORDER — BENZONATATE 100 MG/1
200 CAPSULE ORAL 3 TIMES DAILY PRN
Status: DISCONTINUED | OUTPATIENT
Start: 2024-06-27 | End: 2024-07-03 | Stop reason: HOSPADM

## 2024-06-28 RX ORDER — ACETYLCYSTEINE 200 MG/ML
2 SOLUTION ORAL; RESPIRATORY (INHALATION) EVERY 6 HOURS PRN
Status: DISCONTINUED | OUTPATIENT
Start: 2024-06-28 | End: 2024-07-03 | Stop reason: HOSPADM

## 2024-06-28 RX ORDER — LORAZEPAM 2 MG/ML
2 INJECTION INTRAMUSCULAR EVERY 4 HOURS PRN
Status: DISCONTINUED | OUTPATIENT
Start: 2024-06-28 | End: 2024-07-02

## 2024-06-28 RX ORDER — PANTOPRAZOLE SODIUM 40 MG/10ML
40 INJECTION, POWDER, LYOPHILIZED, FOR SOLUTION INTRAVENOUS EVERY 12 HOURS
Status: DISCONTINUED | OUTPATIENT
Start: 2024-06-28 | End: 2024-06-28

## 2024-06-28 RX ORDER — IPRATROPIUM BROMIDE AND ALBUTEROL SULFATE 2.5; .5 MG/3ML; MG/3ML
3 SOLUTION RESPIRATORY (INHALATION) EVERY 6 HOURS PRN
Status: DISCONTINUED | OUTPATIENT
Start: 2024-06-28 | End: 2024-07-03 | Stop reason: HOSPADM

## 2024-06-28 RX ADMIN — BENZONATATE 200 MG: 100 CAPSULE ORAL at 09:06

## 2024-06-28 RX ADMIN — ONDANSETRON 4 MG: 2 INJECTION INTRAMUSCULAR; INTRAVENOUS at 08:06

## 2024-06-28 RX ADMIN — MUPIROCIN 1 G: 20 OINTMENT TOPICAL at 08:06

## 2024-06-28 RX ADMIN — BENZONATATE 200 MG: 100 CAPSULE ORAL at 03:06

## 2024-06-28 RX ADMIN — ENOXAPARIN SODIUM 40 MG: 40 INJECTION SUBCUTANEOUS at 05:06

## 2024-06-28 RX ADMIN — HYDROCODONE BITARTRATE AND ACETAMINOPHEN 15 ML: 7.5; 325 SOLUTION ORAL at 09:06

## 2024-06-28 RX ADMIN — CEFEPIME 2 G: 2 INJECTION, POWDER, FOR SOLUTION INTRAVENOUS at 08:06

## 2024-06-28 RX ADMIN — IPRATROPIUM BROMIDE AND ALBUTEROL SULFATE 3 ML: .5; 3 SOLUTION RESPIRATORY (INHALATION) at 01:06

## 2024-06-28 RX ADMIN — CEFEPIME 2 G: 2 INJECTION, POWDER, FOR SOLUTION INTRAVENOUS at 12:06

## 2024-06-28 RX ADMIN — ATORVASTATIN CALCIUM 10 MG: 10 TABLET, FILM COATED ORAL at 08:06

## 2024-06-28 RX ADMIN — BENZONATATE 200 MG: 100 CAPSULE ORAL at 01:06

## 2024-06-28 RX ADMIN — LISINOPRIL 2.5 MG: 2.5 TABLET ORAL at 07:06

## 2024-06-28 RX ADMIN — CEFEPIME 2 G: 2 INJECTION, POWDER, FOR SOLUTION INTRAVENOUS at 03:06

## 2024-06-28 RX ADMIN — IPRATROPIUM BROMIDE AND ALBUTEROL SULFATE 3 ML: .5; 3 SOLUTION RESPIRATORY (INHALATION) at 07:06

## 2024-06-28 RX ADMIN — METOPROLOL SUCCINATE 25 MG: 25 TABLET, EXTENDED RELEASE ORAL at 08:06

## 2024-06-28 RX ADMIN — MORPHINE SULFATE 1 MG: 4 INJECTION INTRAVENOUS at 02:06

## 2024-06-28 RX ADMIN — AZITHROMYCIN MONOHYDRATE 500 MG: 500 INJECTION, POWDER, LYOPHILIZED, FOR SOLUTION INTRAVENOUS at 01:06

## 2024-06-28 RX ADMIN — IPRATROPIUM BROMIDE AND ALBUTEROL SULFATE 3 ML: .5; 3 SOLUTION RESPIRATORY (INHALATION) at 12:06

## 2024-06-28 NOTE — ASSESSMENT & PLAN NOTE
Patient is identified as having Systolic (HFrEF) heart failure that is Acute on chronic. CHF is currently controlled. Latest ECHO performed and demonstrates- No results found for this or any previous visit.  . Continue Beta Blocker and ACE/ARB and monitor clinical status closely. Monitor on telemetry. Patient is off CHF pathway.  Monitor strict Is&Os and daily weights.  Place on fluid restriction of  patient was seems to be a little on the dry side . Cardiology has been consulted. Continue to stress to patient importance of self efficacy and  on diet for CHF. Last BNP reviewed- and noted below   Recent Labs   Lab 06/27/24  1429   BNP 1,038.6*

## 2024-06-28 NOTE — CONSULTS
[facility] - ICU    Cardiology  Consult Note    Patient Name: Slick Kamara  MRN: [id number]  Admission Date: 6/27/2024  Hospital Length of Stay: 1 days  Code Status: Full Code   Attending Provider: Vern Mitchell III, *   Consulting Provider: PRESTON Luciano  Primary Care Physician: Vern Mitchell III, MD  Principal Problem:<principal problem not specified>    Patient information was obtained from patient and ER records.     Subjective:     Chief Complaint/Reason for Consult: Tachycardia and CP    HPI:  Patient is a 69-year-old  male with a history of hypotension, cardiomyopathy, history of SVT came to hospital complaining of right-sided lower back pain.  He reported pain is 6/10 which gets worse with deep breath as well cough.  Patient was found to have been in tachycardia.  His heart rate was in 130-150s an EKG confirmed supraventricular tachycardia.  He denied any fever but had chills overnight.  He also had some shortness of breath.  Patient reported that he has been recommended ICD device but he decided against it.  He follows up with Dr. Lou.  No other symptoms reported.  He denied smoking, recent alcohol, drug use.  He quit using alcohol  very recently.    6/28: VSS, NAD. C/o cough and pleuritic CP. On Amio with no more SVT. BP stable. Had elevated ddimer and neg CTA for PE    Previous Cardiac Diagnostics:  He reported no stress test or recent cardiac catheterization.  Reported EF 20% per patient      Past Medical History:   Diagnosis Date    CHF (congestive heart failure)     GERD (gastroesophageal reflux disease)     High cholesterol     HTN (hypertension)      Past Surgical History:   Procedure Laterality Date    COLONOSCOPY  12/28/2016    COLONOSCOPY N/A 07/28/2022    Procedure: COLONOSCOPY;  Surgeon: Vern Mitchell III, MD;  Location: [facility];  Service: Endoscopy;  Laterality: N/A;  Suboptimal prep. Diverticulosis without perforation.    FACIAL FRACTURE SURGERY       HERNIA REPAIR      HIP REPLACEMENT ARTHROPLASTY Left     KNEE ARTHROSCOPY Right     WRIST SURGERY Right      Review of patient's allergies indicates:  No Known Allergies  Current Facility-Administered Medications on File Prior to Encounter   Medication    LIDOcaine (PF) 10 mg/ml (1%) injection 10 mg     Current Outpatient Medications on File Prior to Encounter   Medication Sig    atorvastatin (LIPITOR) 10 MG tablet Take 10 mg by mouth every evening.    furosemide (LASIX) 40 MG tablet Take 40 mg by mouth once daily.    lisinopriL (PRINIVIL,ZESTRIL) 2.5 MG tablet Take 2.5 mg by mouth once daily.    metoprolol succinate (TOPROL-XL) 25 MG 24 hr tablet Take 1 tablet by mouth every morning.    spironolactone (ALDACTONE) 25 MG tablet Take 12.5 mg by mouth every morning.    cetirizine (ZYRTEC) 10 MG tablet Take 1 tablet (10 mg total) by mouth once daily. for 14 days    fluticasone propionate (FLONASE) 50 mcg/actuation nasal spray 2 sprays (100 mcg total) by Each Nostril route once daily.    gabapentin (NEURONTIN) 300 MG capsule Take 1 capsule by mouth 2 (two) times a day.    lisinopriL (PRINIVIL,ZESTRIL) 2.5 MG tablet Take 2.5 mg by mouth every morning.    methylPREDNISolone (MEDROL DOSEPACK) 4 mg tablet Take as directed on ely    metoprolol succinate (TOPROL-XL) 50 MG 24 hr tablet Take 25 mg by mouth once daily.    spironolactone (ALDACTONE) 25 MG tablet Take 25 mg by mouth once daily.     Family History       Problem Relation (Age of Onset)    Breast cancer Mother    Colon cancer Brother    Diabetes Mother    Hyperlipidemia Mother    Hypertension Mother    Pneumonia Father          Tobacco Use    Smoking status: Former    Smokeless tobacco: Never   Substance and Sexual Activity    Alcohol use: Yes     Alcohol/week: 7.0 standard drinks of alcohol     Types: 7 Cans of beer per week    Drug use: Never    Sexual activity: Not on file       Review of Systems   Constitutional:  Positive for fatigue.   Respiratory:  Positive  for cough and shortness of breath.    Cardiovascular:  Positive for chest pain. Negative for leg swelling.   Genitourinary: Negative.    Neurological:  Positive for weakness.   Hematological: Negative.    Psychiatric/Behavioral: Negative.     :  Comprehensive review of systems performed and is negative.  Objective:     Vital Signs (Most Recent):  Temp: 98.4 °F (36.9 °C) (06/28/24 0800)  Pulse: 94 (06/28/24 0826)  Resp: (!) 31 (06/28/24 0800)  BP: 107/75 (06/28/24 0826)  SpO2: 96 % (06/28/24 0800) Vital Signs (24h Range):  Temp:  [98.1 °F (36.7 °C)-98.4 °F (36.9 °C)] 98.4 °F (36.9 °C)  Pulse:  [] 94  Resp:  [14-35] 31  SpO2:  [87 %-100 %] 96 %  BP: ()/(52-77) 107/75   Weight: 91.6 kg (202 lb)  Body mass index is 30.26 kg/m².  SpO2: 96 %       Intake/Output Summary (Last 24 hours) at 6/28/2024 0937  Last data filed at 6/28/2024 0621  Gross per 24 hour   Intake 2712.49 ml   Output 500 ml   Net 2212.49 ml     Lines/Drains/Airways       Peripheral Intravenous Line  Duration                  Peripheral IV - Single Lumen 06/27/24 1428 20 G Right Antecubital <1 day         Peripheral IV - Single Lumen 06/27/24 1707 20 G Left;Posterior Hand <1 day                  Significant Labs:  Recent Results (from the past 72 hour(s))   EKG 12-lead    Collection Time: 06/27/24  2:19 PM   Result Value Ref Range    QRS Duration 98 ms    [facility] QTC Calculation 453 ms   COVID/FLU A&B PCR    Collection Time: 06/27/24  2:27 PM   Result Value Ref Range    Influenza A PCR Not Detected Not Detected    Influenza B PCR Not Detected Not Detected    SARS-CoV-2 PCR Not Detected Not Detected, Negative   APTT    Collection Time: 06/27/24  2:29 PM   Result Value Ref Range    PTT 33.1 24.6 - 37.2 seconds   Brain natriuretic peptide    Collection Time: 06/27/24  2:29 PM   Result Value Ref Range    Natriuretic Peptide 1,038.6 (H) <=100.0 pg/mL   Comprehensive metabolic panel    Collection Time: 06/27/24  2:29 PM   Result Value Ref Range    Sodium  132 (L) 136 - 145 mmol/L    Potassium 4.1 3.5 - 5.1 mmol/L    Chloride 100 98 - 107 mmol/L    CO2 21 (L) 23 - 31 mmol/L    Glucose 132 (H) 82 - 115 mg/dL    Blood Urea Nitrogen 21.0 8.4 - 25.7 mg/dL    Creatinine 0.95 0.73 - 1.18 mg/dL    Calcium 9.9 8.8 - 10.0 mg/dL    Protein Total 7.9 (H) 5.8 - 7.6 gm/dL    Albumin 3.8 3.4 - 4.8 g/dL    Globulin 4.1 (H) 2.4 - 3.5 gm/dL    Albumin/Globulin Ratio 0.9 (L) 1.1 - 2.0 ratio    Bilirubin Total 2.1 (H) <=1.5 mg/dL    ALP 95 40 - 150 unit/L    ALT 22 0 - 55 unit/L    AST 17 5 - 34 unit/L    eGFR >60 mL/min/1.73/m2    Anion Gap 11.0 mEq/L    BUN/Creatinine Ratio 22    Troponin I    Collection Time: 06/27/24  2:29 PM   Result Value Ref Range    Troponin-I 0.017 0.000 - 0.045 ng/mL   Protime-INR    Collection Time: 06/27/24  2:29 PM   Result Value Ref Range    PT 15.7 (H) 12.5 - 14.5 seconds    INR 1.2 <=1.3   Magnesium    Collection Time: 06/27/24  2:29 PM   Result Value Ref Range    Magnesium Level 2.30 1.60 - 2.60 mg/dL   TSH    Collection Time: 06/27/24  2:29 PM   Result Value Ref Range    TSH 1.231 0.350 - 4.940 uIU/mL   CBC with Differential    Collection Time: 06/27/24  2:29 PM   Result Value Ref Range    WBC 14.70 (H) 4.50 - 11.50 x10(3)/mcL    RBC 3.82 (L) 4.70 - 6.10 x10(6)/mcL    Hgb 12.1 (L) 14.0 - 18.0 g/dL    Hct 35.9 (L) 42.0 - 52.0 %    MCV 94.0 80.0 - 94.0 fL    MCH 31.7 (H) 27.0 - 31.0 pg    MCHC 33.7 33.0 - 36.0 g/dL    RDW 13.8 11.5 - 17.0 %    Platelet 258 130 - 400 x10(3)/mcL    MPV 9.9 7.4 - 10.4 fL    Neut % 83.3 %    Lymph % 8.1 %    Mono % 8.2 %    Eos % 0.0 %    Basophil % 0.1 %    Lymph # 1.19 0.6 - 4.6 x10(3)/mcL    Neut # 12.24 (H) 2.1 - 9.2 x10(3)/mcL    Mono # 1.21 0.1 - 1.3 x10(3)/mcL    Eos # 0.00 0 - 0.9 x10(3)/mcL    Baso # 0.02 <=0.2 x10(3)/mcL    IG# 0.04 0 - 0.04 x10(3)/mcL    IG% 0.3 %   D dimer, quantitative    Collection Time: 06/27/24  2:29 PM   Result Value Ref Range    D-Dimer 2.40 (H) 0.00 - 0.50 ug/mL FEU   Lactic acid, plasma     Collection Time: 06/27/24  4:15 PM   Result Value Ref Range    Lactic Acid Level 0.9 0.5 - 2.2 mmol/L   Urinalysis, Reflex to Urine Culture    Collection Time: 06/27/24  4:56 PM    Specimen: Urine   Result Value Ref Range    Color, UA Yellow Yellow, Light-Yellow, Dark Yellow, Nell, Straw    Appearance, UA Clear Clear    Specific Gravity, UA <=1.005 1.005 - 1.030    pH, UA 5.0 5.0 - 8.5    Protein, UA Negative Negative    Glucose, UA Negative Negative, Normal    Ketones, UA Negative Negative    Blood, UA Negative Negative    Bilirubin, UA Negative Negative    Urobilinogen, UA 0.2 0.2, 1.0, Normal    Nitrites, UA Negative Negative    Leukocyte Esterase, UA Negative Negative   Comprehensive metabolic panel    Collection Time: 06/28/24  4:04 AM   Result Value Ref Range    Sodium 129 (L) 136 - 145 mmol/L    Potassium 4.1 3.5 - 5.1 mmol/L    Chloride 101 98 - 107 mmol/L    CO2 20 (L) 23 - 31 mmol/L    Glucose 202 (H) 82 - 115 mg/dL    Blood Urea Nitrogen 24.0 8.4 - 25.7 mg/dL    Creatinine 0.94 0.73 - 1.18 mg/dL    Calcium 9.0 8.8 - 10.0 mg/dL    Protein Total 6.8 5.8 - 7.6 gm/dL    Albumin 3.1 (L) 3.4 - 4.8 g/dL    Globulin 3.7 (H) 2.4 - 3.5 gm/dL    Albumin/Globulin Ratio 0.8 (L) 1.1 - 2.0 ratio    Bilirubin Total 0.7 <=1.5 mg/dL    ALP 88 40 - 150 unit/L    ALT 20 0 - 55 unit/L    AST 20 5 - 34 unit/L    eGFR >60 mL/min/1.73/m2    Anion Gap 8.0 mEq/L    BUN/Creatinine Ratio 26    Magnesium    Collection Time: 06/28/24  4:04 AM   Result Value Ref Range    Magnesium Level 2.30 1.60 - 2.60 mg/dL   Phosphorus    Collection Time: 06/28/24  4:04 AM   Result Value Ref Range    Phosphorus Level 2.0 (L) 2.3 - 4.7 mg/dL   CBC with Differential    Collection Time: 06/28/24  4:04 AM   Result Value Ref Range    WBC 13.32 (H) 4.50 - 11.50 x10(3)/mcL    RBC 3.31 (L) 4.70 - 6.10 x10(6)/mcL    Hgb 10.5 (L) 14.0 - 18.0 g/dL    Hct 31.8 (L) 42.0 - 52.0 %    MCV 96.1 (H) 80.0 - 94.0 fL    MCH 31.7 (H) 27.0 - 31.0 pg    MCHC 33.0 33.0  - 36.0 g/dL    RDW 13.9 11.5 - 17.0 %    Platelet 213 130 - 400 x10(3)/mcL    MPV 10.6 (H) 7.4 - 10.4 fL    Neut % 83.0 %    Lymph % 8.9 %    Mono % 7.3 %    Eos % 0.1 %    Basophil % 0.2 %    Lymph # 1.18 0.6 - 4.6 x10(3)/mcL    Neut # 11.07 (H) 2.1 - 9.2 x10(3)/mcL    Mono # 0.97 0.1 - 1.3 x10(3)/mcL    Eos # 0.01 0 - 0.9 x10(3)/mcL    Baso # 0.02 <=0.2 x10(3)/mcL    IG# 0.07 (H) 0 - 0.04 x10(3)/mcL    IG% 0.5 %     Significant Imaging:  Imaging Results              CTA Chest Non-Coronary (PE Studies) (Final result)  Result time 06/27/24 15:57:36      Final result by Isidoro Yates MD (06/27/24 15:57:36)                   Impression:      1. No significant filling defects noted to the pulmonary arteries to indicate pulmonary embolus  2. Patchy hazy opacification of the majority of the right lower lobe suspicious for infiltrate and atelectasis with small right posterior basilar pleural reaction  3. Mediastinal and hilar lymphadenopathy with the right side greater than the left  4. Cardiomegaly  5. Thoracic spondylosis  6. Findings and other details as above      Electronically signed by: Isidoro Yates  Date:    06/27/2024  Time:    15:57               Narrative:    EXAMINATION:  CT ANGIOGRAM CHEST WITH IV CONTRAST:    CLINICAL HISTORY:  High clinical suspicion for pulmonary embolus    TECHNIQUE:  PATIENT RADIATION DOSE: DLP(mGycm) 348    As per PQRS measures, all CT scans at this facility used dose modulation, iterative reconstruction, and/or weight based dose adjustment when appropriate to reduce radiation dose to as low as reasonably achievable.    COMPARISON:  11/08/2016    FINDINGS:  Serial axial images were obtained of the chest with the administration of IV contrast. Additional coronal and sagittal mip reconstructions as well as 3-D rotational spin reconstructions were performed. NASCET criteria was utilized. Degenerative changes are noted to the thoracolumbar spine.  There is mild anterior wedge  configuration at lower thoracic vertebral bodies.  Thyroid lobes are relatively symmetric in size.  Atherosclerosis is seen within the aorta and branching vessels. Multiple enlarged lymph nodes seen within the mediastinum and shaggy bilaterally with the right side greater than the left.  A small right pleural reaction is present.  No axillary lymphadenopathy is identified.  Contrast is identified within the heart and pulmonary vessels.  No significant filling defects are noted to the pulmonary arteries to indicate pulmonary embolus.  The airways are grossly patent.  Patchy hazy opacities are evident throughout the majority of the right lower lobe.  Atelectasis and/or scarring is evident the left lower lobe.                                       X-Ray Chest 1 View (Final result)  Result time 06/27/24 16:22:10      Final result by Isidoro Yates MD (06/27/24 16:22:10)                   Impression:      1. Cardiomegaly  2. Elevated right hemidiaphragm with suspect right basilar infiltrate and or atelectasis  3. Thoracic spondylosis and scoliosis      Electronically signed by: Isidoro Yates  Date:    06/27/2024  Time:    16:22               Narrative:    EXAMINATION:  XR CHEST 1 VIEW    CLINICAL HISTORY:  , Shortness of breath.    COMPARISON:  06/26/2024    FINDINGS:  An AP view or more reveals  the heart to be enlarged. The trachea is midline. There is mild elevation of the right hemidiaphragm. Mild patchy hazy increased is evident the right lung base.  Degenerative changes and curvature are noted to the thoracic spine.                                    EKG:  Supraventricular tachycardia    Telemetry:  SR    Physical Exam  Constitutional:       General: He is not in acute distress.  HENT:      Mouth/Throat:      Mouth: Mucous membranes are moist.   Eyes:      Extraocular Movements: Extraocular movements intact.   Cardiovascular:      Rate and Rhythm: Normal rate and regular rhythm.   Pulmonary:      Effort: Pulmonary  effort is normal.      Breath sounds: Rales present.   Abdominal:      Palpations: Abdomen is soft.   Musculoskeletal:      Right lower leg: No edema.      Left lower leg: No edema.   Skin:     General: Skin is warm and dry.   Neurological:      General: No focal deficit present.      Mental Status: He is alert and oriented to person, place, and time.   Psychiatric:         Mood and Affect: Mood normal.         Home Medications:   Current Facility-Administered Medications on File Prior to Encounter   Medication Dose Route Frequency Provider Last Rate Last Admin    LIDOcaine (PF) 10 mg/ml (1%) injection 10 mg  1 mL Intradermal Once Chi Sanchez DO         Current Outpatient Medications on File Prior to Encounter   Medication Sig Dispense Refill    atorvastatin (LIPITOR) 10 MG tablet Take 10 mg by mouth every evening.      furosemide (LASIX) 40 MG tablet Take 40 mg by mouth once daily.      lisinopriL (PRINIVIL,ZESTRIL) 2.5 MG tablet Take 2.5 mg by mouth once daily.      metoprolol succinate (TOPROL-XL) 25 MG 24 hr tablet Take 1 tablet by mouth every morning.      spironolactone (ALDACTONE) 25 MG tablet Take 12.5 mg by mouth every morning.      cetirizine (ZYRTEC) 10 MG tablet Take 1 tablet (10 mg total) by mouth once daily. for 14 days 14 tablet 0    fluticasone propionate (FLONASE) 50 mcg/actuation nasal spray 2 sprays (100 mcg total) by Each Nostril route once daily. 15 g 0    gabapentin (NEURONTIN) 300 MG capsule Take 1 capsule by mouth 2 (two) times a day.      lisinopriL (PRINIVIL,ZESTRIL) 2.5 MG tablet Take 2.5 mg by mouth every morning.      methylPREDNISolone (MEDROL DOSEPACK) 4 mg tablet Take as directed on ely 1 each 0    metoprolol succinate (TOPROL-XL) 50 MG 24 hr tablet Take 25 mg by mouth once daily.      spironolactone (ALDACTONE) 25 MG tablet Take 25 mg by mouth once daily.       Current Inpatient Medications:    Current Facility-Administered Medications:     acetylcysteine 200 mg/ml (20%)  solution 2 mL, 2 mL, Nebulization, Q6H PRN, Vern Mitchell III, MD    albuterol-ipratropium 2.5 mg-0.5 mg/3 mL nebulizer solution 3 mL, 3 mL, Nebulization, Q6H PRN, Vern Mitchell III, MD, 3 mL at 06/28/24 0738    amiodarone 360 mg/200 mL (1.8 mg/mL) infusion, 0.5 mg/min, Intravenous, Continuous, Vern Mitchell III, MD, Last Rate: 16.7 mL/hr at 06/28/24 0621, 0.5 mg/min at 06/28/24 0621    atorvastatin tablet 10 mg, 10 mg, Oral, QHS, Vern Mitchell III, MD    azithromycin (ZITHROMAX) 500 mg in D5W 250 mL IVPB (Vial-Mate), 500 mg, Intravenous, Q24H, Vern Mitchell III, MD, Stopped at 06/28/24 0206    benzonatate capsule 200 mg, 200 mg, Oral, TID PRN, Vern Mitchell III, MD, 200 mg at 06/28/24 0108    bisacodyL suppository 10 mg, 10 mg, Rectal, Daily PRN, Vern Mitchell III, MD    ceFEPIme (MAXIPIME) 2 g in D5W 100 mL IVPB (MB+), 2 g, Intravenous, Q8H, Vern Mitchell III, MD, Last Rate: 200 mL/hr at 06/28/24 0826, 2 g at 06/28/24 0826    enoxaparin injection 40 mg, 40 mg, Subcutaneous, Daily, Vern Mitchell III, MD    gabapentin capsule 300 mg, 300 mg, Oral, BID, Vern Mitchell III, MD    lactated ringers infusion, , Intravenous, Continuous, Vern Mitchell III, MD, Last Rate: 50 mL/hr at 06/28/24 0621, Rate Verify at 06/28/24 0621    lisinopriL tablet 2.5 mg, 2.5 mg, Oral, QAM, Vern Mitchell III, MD, 2.5 mg at 06/28/24 0748    melatonin tablet 6 mg, 6 mg, Oral, Nightly PRN, Vern Mitchell III, MD    metoprolol succinate (TOPROL-XL) 24 hr tablet 25 mg, 25 mg, Oral, Daily, Vern Mitchell III, MD, 25 mg at 06/28/24 0826    morphine injection 1 mg, 1 mg, Intravenous, Q4H PRN, Vern Mitchell III, MD, 1 mg at 06/28/24 0248    mupirocin 2 % ointment, , Nasal, BID, Vern Mitchell III, MD, 1 g at 06/28/24 0826    ondansetron injection 4 mg, 4 mg, Intravenous, Q8H PRN, Vern Mitchell III, MD    polyethylene glycol packet 17 g, 17 g, Oral, Daily, Paula  Vern TAY III, MD    sodium chloride 0.9% flush 10 mL, 10 mL, Intravenous, PRN, Vern Mitchell III, MD    sodium chloride 0.9% flush 10 mL, 10 mL, Intravenous, Q12H PRN, Vern Mitchell III, MD    Facility-Administered Medications Ordered in Other Encounters:     LIDOcaine (PF) 10 mg/ml (1%) injection 10 mg, 1 mL, Intradermal, Once, Chi Sanchez DO  VTE Risk Mitigation (From admission, onward)           Ordered     enoxaparin injection 40 mg  Daily         06/27/24 2347     IP VTE HIGH RISK PATIENT  Once         06/27/24 2347     Place sequential compression device  Until discontinued         06/27/24 1729                  Assessment:   Supraventricular tachycardia - not recurrent          - likely precipitated by PNA   Pleuritic chest pain          - CTA neg PE  Pneumonia  Heart failure with BNP 1038.6           -Euvolemic on exam  History of cardiomyopathy unknown type          - nonischemic per patient      Plan:   Ok to DC Amiodarone as BP now stable  Continue Metoprolol and can uptitrate as needed for recurrence   Ok to hold Lisinopril if BP not allowable to above  Antibiotics per primary team  Will need follow up with primary cardiologist upon DC  Call if needed    Thank you for your consult.     Etelvina Baez, PRESTON  Cardiology  [facility] - ICU  06/28/2024

## 2024-06-28 NOTE — HPI
69-year-old pleasant white male with a history of an ischemic cardiomyopathy proximally 10 years ago with a residual depressed EF of a proximally 20-30%.  His cardiologist is Dr. Lou in [place].  He was last echo was about a year ago the patient stated.  I saw him in the office last Wednesday at which point he had a cough but no chest pain.  He was given an order for chest x-ray and performed that yesterday.  It did not show anything concerning but yesterday started having significant chest pain that was right-sided and almost flank as well.  He decided to come in the emergency room and get checked out.  He had on CT a negative CT for PE but a significantly developing early right-sided pneumonia for which he is being admitted.  Patient denies any rigors or fever but is having a cough and pleuritic cough at times that he says is incessantly uncontrollable.      Patient was also in SVT was placed on amiodarone drip and broken to sinus rhythm once he arrived proximally in ICU.  He would received about 2 L of fluids.  He is being kept on the amiodarone protocol.

## 2024-06-28 NOTE — PLAN OF CARE
Problem: Adult Inpatient Plan of Care  Goal: Plan of Care Review  Outcome: Met  Goal: Patient-Specific Goal (Individualized)  Outcome: Met  Goal: Absence of Hospital-Acquired Illness or Injury  Outcome: Met  Goal: Optimal Comfort and Wellbeing  Outcome: Met  Goal: Readiness for Transition of Care  Outcome: Met     Problem: Comorbidity Management  Goal: Maintenance of Heart Failure Symptom Control  Outcome: Met  Goal: Blood Pressure in Desired Range  Outcome: Met     Problem: Pneumonia  Goal: Fluid Balance  Outcome: Met  Goal: Resolution of Infection Signs and Symptoms  Outcome: Met  Goal: Effective Oxygenation and Ventilation  Outcome: Met     Problem: Dysrhythmia  Goal: Normalized Cardiac Rhythm  Outcome: Met

## 2024-06-28 NOTE — CONSULTS
[facility] - Emergency Dept    Cardiology  Consult Note    Patient Name: Slick Kamara  MRN: [id number]  Admission Date: 6/27/2024  Hospital Length of Stay: 0 days  Code Status: Full Code   Attending Provider: Vern Mitchell III, *   Consulting Provider: Eduardo Taylor MD  Primary Care Physician: Vern Mitchell III, MD  Principal Problem:<principal problem not specified>    Patient information was obtained from patient and ER records.     Subjective:     Chief Complaint/Reason for Consult: Tachycardia and CP    HPI:  Patient is a 69-year-old  male with a history of hypotension, cardiomyopathy, history of SVT came to hospital complaining of right-sided lower back pain.  He reported pain is 6/10 which gets worse with deep breath as well cough.  Patient was found to have been in tachycardia.  His heart rate was in 130-150s an EKG confirmed supraventricular tachycardia.  He denied any fever but had chills overnight.  He also had some shortness of breath.  Patient reported that he has been recommended ICD device but he decided against it.  He follows up with Dr. Lou.  No other symptoms reported.  He denied smoking, recent alcohol, drug use.  He quit using alcohol  very recently.    Previous Cardiac Diagnostics:  He reported no stress test or recent cardiac catheterization.      Past Medical History:   Diagnosis Date    CHF (congestive heart failure)     GERD (gastroesophageal reflux disease)     High cholesterol     HTN (hypertension)      Past Surgical History:   Procedure Laterality Date    COLONOSCOPY  12/28/2016    COLONOSCOPY N/A 07/28/2022    Procedure: COLONOSCOPY;  Surgeon: Vern Mitchell III, MD;  Location: [facility];  Service: Endoscopy;  Laterality: N/A;  Suboptimal prep. Diverticulosis without perforation.    FACIAL FRACTURE SURGERY      HERNIA REPAIR      HIP REPLACEMENT ARTHROPLASTY Left     KNEE ARTHROSCOPY Right     WRIST SURGERY Right      Review of patient's allergies  indicates:  No Known Allergies  Current Facility-Administered Medications on File Prior to Encounter   Medication    LIDOcaine (PF) 10 mg/ml (1%) injection 10 mg     Current Outpatient Medications on File Prior to Encounter   Medication Sig    atorvastatin (LIPITOR) 10 MG tablet Take 10 mg by mouth every evening.    furosemide (LASIX) 40 MG tablet Take 40 mg by mouth once daily.    lisinopriL (PRINIVIL,ZESTRIL) 2.5 MG tablet Take 2.5 mg by mouth once daily.    metoprolol succinate (TOPROL-XL) 25 MG 24 hr tablet Take 1 tablet by mouth every morning.    spironolactone (ALDACTONE) 25 MG tablet Take 12.5 mg by mouth every morning.    cetirizine (ZYRTEC) 10 MG tablet Take 1 tablet (10 mg total) by mouth once daily. for 14 days    fluticasone propionate (FLONASE) 50 mcg/actuation nasal spray 2 sprays (100 mcg total) by Each Nostril route once daily.    gabapentin (NEURONTIN) 300 MG capsule Take 1 capsule by mouth 2 (two) times a day.    lisinopriL (PRINIVIL,ZESTRIL) 2.5 MG tablet Take 2.5 mg by mouth every morning.    methylPREDNISolone (MEDROL DOSEPACK) 4 mg tablet Take as directed on ely    metoprolol succinate (TOPROL-XL) 50 MG 24 hr tablet Take 25 mg by mouth once daily.    spironolactone (ALDACTONE) 25 MG tablet Take 25 mg by mouth once daily.     Family History       Problem Relation (Age of Onset)    Breast cancer Mother    Colon cancer Brother    Diabetes Mother    Hyperlipidemia Mother    Hypertension Mother    Pneumonia Father          Tobacco Use    Smoking status: Former    Smokeless tobacco: Never   Substance and Sexual Activity    Alcohol use: Yes     Alcohol/week: 7.0 standard drinks of alcohol     Types: 7 Cans of beer per week    Drug use: Never    Sexual activity: Not on file       Review of Systems:  Comprehensive review of systems performed and is negative.  Objective:     Vital Signs (Most Recent):  Temp: 98.1 °F (36.7 °C) (06/27/24 1428)  Pulse: 99 (06/27/24 1933)  Resp: (!) 24 (06/27/24 1933)  BP:  94/61 (06/27/24 1933)  SpO2: 97 % (06/27/24 1903) Vital Signs (24h Range):  Temp:  [98.1 °F (36.7 °C)] 98.1 °F (36.7 °C)  Pulse:  [] 99  Resp:  [19-34] 24  SpO2:  [91 %-100 %] 97 %  BP: ()/(58-77) 94/61   Weight: 91.6 kg (202 lb)  Body mass index is 30.26 kg/m².  SpO2: 97 %       Intake/Output Summary (Last 24 hours) at 6/27/2024 2014  Last data filed at 6/27/2024 1950  Gross per 24 hour   Intake 1289.7 ml   Output --   Net 1289.7 ml     Lines/Drains/Airways       Peripheral Intravenous Line  Duration                  Peripheral IV - Single Lumen 06/27/24 1428 20 G Right Antecubital <1 day         Peripheral IV - Single Lumen 06/27/24 1707 20 G Left;Posterior Hand <1 day                  Significant Labs:  Recent Results (from the past 72 hour(s))   EKG 12-lead    Collection Time: 06/27/24  2:19 PM   Result Value Ref Range    QRS Duration 98 ms    [facility] QTC Calculation 453 ms   COVID/FLU A&B PCR    Collection Time: 06/27/24  2:27 PM   Result Value Ref Range    Influenza A PCR Not Detected Not Detected    Influenza B PCR Not Detected Not Detected    SARS-CoV-2 PCR Not Detected Not Detected, Negative   APTT    Collection Time: 06/27/24  2:29 PM   Result Value Ref Range    PTT 33.1 24.6 - 37.2 seconds   Brain natriuretic peptide    Collection Time: 06/27/24  2:29 PM   Result Value Ref Range    Natriuretic Peptide 1,038.6 (H) <=100.0 pg/mL   Comprehensive metabolic panel    Collection Time: 06/27/24  2:29 PM   Result Value Ref Range    Sodium 132 (L) 136 - 145 mmol/L    Potassium 4.1 3.5 - 5.1 mmol/L    Chloride 100 98 - 107 mmol/L    CO2 21 (L) 23 - 31 mmol/L    Glucose 132 (H) 82 - 115 mg/dL    Blood Urea Nitrogen 21.0 8.4 - 25.7 mg/dL    Creatinine 0.95 0.73 - 1.18 mg/dL    Calcium 9.9 8.8 - 10.0 mg/dL    Protein Total 7.9 (H) 5.8 - 7.6 gm/dL    Albumin 3.8 3.4 - 4.8 g/dL    Globulin 4.1 (H) 2.4 - 3.5 gm/dL    Albumin/Globulin Ratio 0.9 (L) 1.1 - 2.0 ratio    Bilirubin Total 2.1 (H) <=1.5 mg/dL    ALP 95 40  - 150 unit/L    ALT 22 0 - 55 unit/L    AST 17 5 - 34 unit/L    eGFR >60 mL/min/1.73/m2    Anion Gap 11.0 mEq/L    BUN/Creatinine Ratio 22    Troponin I    Collection Time: 06/27/24  2:29 PM   Result Value Ref Range    Troponin-I 0.017 0.000 - 0.045 ng/mL   Protime-INR    Collection Time: 06/27/24  2:29 PM   Result Value Ref Range    PT 15.7 (H) 12.5 - 14.5 seconds    INR 1.2 <=1.3   Magnesium    Collection Time: 06/27/24  2:29 PM   Result Value Ref Range    Magnesium Level 2.30 1.60 - 2.60 mg/dL   TSH    Collection Time: 06/27/24  2:29 PM   Result Value Ref Range    TSH 1.231 0.350 - 4.940 uIU/mL   CBC with Differential    Collection Time: 06/27/24  2:29 PM   Result Value Ref Range    WBC 14.70 (H) 4.50 - 11.50 x10(3)/mcL    RBC 3.82 (L) 4.70 - 6.10 x10(6)/mcL    Hgb 12.1 (L) 14.0 - 18.0 g/dL    Hct 35.9 (L) 42.0 - 52.0 %    MCV 94.0 80.0 - 94.0 fL    MCH 31.7 (H) 27.0 - 31.0 pg    MCHC 33.7 33.0 - 36.0 g/dL    RDW 13.8 11.5 - 17.0 %    Platelet 258 130 - 400 x10(3)/mcL    MPV 9.9 7.4 - 10.4 fL    Neut % 83.3 %    Lymph % 8.1 %    Mono % 8.2 %    Eos % 0.0 %    Basophil % 0.1 %    Lymph # 1.19 0.6 - 4.6 x10(3)/mcL    Neut # 12.24 (H) 2.1 - 9.2 x10(3)/mcL    Mono # 1.21 0.1 - 1.3 x10(3)/mcL    Eos # 0.00 0 - 0.9 x10(3)/mcL    Baso # 0.02 <=0.2 x10(3)/mcL    IG# 0.04 0 - 0.04 x10(3)/mcL    IG% 0.3 %   D dimer, quantitative    Collection Time: 06/27/24  2:29 PM   Result Value Ref Range    D-Dimer 2.40 (H) 0.00 - 0.50 ug/mL FEU   Lactic acid, plasma    Collection Time: 06/27/24  4:15 PM   Result Value Ref Range    Lactic Acid Level 0.9 0.5 - 2.2 mmol/L   Urinalysis, Reflex to Urine Culture    Collection Time: 06/27/24  4:56 PM    Specimen: Urine   Result Value Ref Range    Color, UA Yellow Yellow, Light-Yellow, Dark Yellow, Nell, Straw    Appearance, UA Clear Clear    Specific Gravity, UA <=1.005 1.005 - 1.030    pH, UA 5.0 5.0 - 8.5    Protein, UA Negative Negative    Glucose, UA Negative Negative, Normal    Ketones,  UA Negative Negative    Blood, UA Negative Negative    Bilirubin, UA Negative Negative    Urobilinogen, UA 0.2 0.2, 1.0, Normal    Nitrites, UA Negative Negative    Leukocyte Esterase, UA Negative Negative     Significant Imaging:  Imaging Results              CTA Chest Non-Coronary (PE Studies) (Final result)  Result time 06/27/24 15:57:36      Final result by Isidoro Yates MD (06/27/24 15:57:36)                   Impression:      1. No significant filling defects noted to the pulmonary arteries to indicate pulmonary embolus  2. Patchy hazy opacification of the majority of the right lower lobe suspicious for infiltrate and atelectasis with small right posterior basilar pleural reaction  3. Mediastinal and hilar lymphadenopathy with the right side greater than the left  4. Cardiomegaly  5. Thoracic spondylosis  6. Findings and other details as above      Electronically signed by: Isidoro Yates  Date:    06/27/2024  Time:    15:57               Narrative:    EXAMINATION:  CT ANGIOGRAM CHEST WITH IV CONTRAST:    CLINICAL HISTORY:  High clinical suspicion for pulmonary embolus    TECHNIQUE:  PATIENT RADIATION DOSE: DLP(mGycm) 348    As per PQRS measures, all CT scans at this facility used dose modulation, iterative reconstruction, and/or weight based dose adjustment when appropriate to reduce radiation dose to as low as reasonably achievable.    COMPARISON:  11/08/2016    FINDINGS:  Serial axial images were obtained of the chest with the administration of IV contrast. Additional coronal and sagittal mip reconstructions as well as 3-D rotational spin reconstructions were performed. NASCET criteria was utilized. Degenerative changes are noted to the thoracolumbar spine.  There is mild anterior wedge configuration at lower thoracic vertebral bodies.  Thyroid lobes are relatively symmetric in size.  Atherosclerosis is seen within the aorta and branching vessels. Multiple enlarged lymph nodes seen within the mediastinum  and shaggy bilaterally with the right side greater than the left.  A small right pleural reaction is present.  No axillary lymphadenopathy is identified.  Contrast is identified within the heart and pulmonary vessels.  No significant filling defects are noted to the pulmonary arteries to indicate pulmonary embolus.  The airways are grossly patent.  Patchy hazy opacities are evident throughout the majority of the right lower lobe.  Atelectasis and/or scarring is evident the left lower lobe.                                       X-Ray Chest 1 View (Final result)  Result time 06/27/24 16:22:10      Final result by Isidoro Yates MD (06/27/24 16:22:10)                   Impression:      1. Cardiomegaly  2. Elevated right hemidiaphragm with suspect right basilar infiltrate and or atelectasis  3. Thoracic spondylosis and scoliosis      Electronically signed by: Isidoro Yates  Date:    06/27/2024  Time:    16:22               Narrative:    EXAMINATION:  XR CHEST 1 VIEW    CLINICAL HISTORY:  , Shortness of breath.    COMPARISON:  06/26/2024    FINDINGS:  An AP view or more reveals  the heart to be enlarged. The trachea is midline. There is mild elevation of the right hemidiaphragm. Mild patchy hazy increased is evident the right lung base.  Degenerative changes and curvature are noted to the thoracic spine.                                    EKG:  Supraventricular tachycardia    Telemetry:  SVT    Physical Exam:  Alert and oriented x3.  S1-S2 tachycardia  Clear to auscultation bilaterally    Home Medications:   Current Facility-Administered Medications on File Prior to Encounter   Medication Dose Route Frequency Provider Last Rate Last Admin    LIDOcaine (PF) 10 mg/ml (1%) injection 10 mg  1 mL Intradermal Once Chi Sanchez DO         Current Outpatient Medications on File Prior to Encounter   Medication Sig Dispense Refill    atorvastatin (LIPITOR) 10 MG tablet Take 10 mg by mouth every evening.      furosemide  (LASIX) 40 MG tablet Take 40 mg by mouth once daily.      lisinopriL (PRINIVIL,ZESTRIL) 2.5 MG tablet Take 2.5 mg by mouth once daily.      metoprolol succinate (TOPROL-XL) 25 MG 24 hr tablet Take 1 tablet by mouth every morning.      spironolactone (ALDACTONE) 25 MG tablet Take 12.5 mg by mouth every morning.      cetirizine (ZYRTEC) 10 MG tablet Take 1 tablet (10 mg total) by mouth once daily. for 14 days 14 tablet 0    fluticasone propionate (FLONASE) 50 mcg/actuation nasal spray 2 sprays (100 mcg total) by Each Nostril route once daily. 15 g 0    gabapentin (NEURONTIN) 300 MG capsule Take 1 capsule by mouth 2 (two) times a day.      lisinopriL (PRINIVIL,ZESTRIL) 2.5 MG tablet Take 2.5 mg by mouth every morning.      methylPREDNISolone (MEDROL DOSEPACK) 4 mg tablet Take as directed on ely 1 each 0    metoprolol succinate (TOPROL-XL) 50 MG 24 hr tablet Take 25 mg by mouth once daily.      spironolactone (ALDACTONE) 25 MG tablet Take 25 mg by mouth once daily.       Current Inpatient Medications:    Current Facility-Administered Medications:     amiodarone 360 mg/200 mL (1.8 mg/mL) infusion, 1 mg/min, Intravenous, Continuous, Silvio Hatch MD, Last Rate: 33.3 mL/hr at 06/27/24 1950, 1 mg/min at 06/27/24 1950    cefTRIAXone (Rocephin) 1 g in D5W 100 mL IVPB (MB+), 1 g, Intravenous, Q24H, Silvio Hatch MD, Stopped at 06/27/24 1718    doxycycline 100 mg in D5W 100 mL IVPB (MB+), 100 mg, Intravenous, Q12H, Silvio Hatch MD, Stopped at 06/27/24 1834    lactated ringers infusion, , Intravenous, Continuous, Silvio Hatch MD, Last Rate: 75 mL/hr at 06/27/24 1950, Rate Verify at 06/27/24 1950    melatonin tablet 6 mg, 6 mg, Oral, Nightly PRN, Silvio Hatch MD    sodium chloride 0.9% flush 10 mL, 10 mL, Intravenous, PRN, Silvio Hatch MD    Current Outpatient Medications:     atorvastatin (LIPITOR) 10 MG tablet, Take 10 mg by mouth every evening., Disp: , Rfl:     furosemide (LASIX) 40 MG  tablet, Take 40 mg by mouth once daily., Disp: , Rfl:     lisinopriL (PRINIVIL,ZESTRIL) 2.5 MG tablet, Take 2.5 mg by mouth once daily., Disp: , Rfl:     metoprolol succinate (TOPROL-XL) 25 MG 24 hr tablet, Take 1 tablet by mouth every morning., Disp: , Rfl:     spironolactone (ALDACTONE) 25 MG tablet, Take 12.5 mg by mouth every morning., Disp: , Rfl:     cetirizine (ZYRTEC) 10 MG tablet, Take 1 tablet (10 mg total) by mouth once daily. for 14 days, Disp: 14 tablet, Rfl: 0    fluticasone propionate (FLONASE) 50 mcg/actuation nasal spray, 2 sprays (100 mcg total) by Each Nostril route once daily., Disp: 15 g, Rfl: 0    gabapentin (NEURONTIN) 300 MG capsule, Take 1 capsule by mouth 2 (two) times a day., Disp: , Rfl:     lisinopriL (PRINIVIL,ZESTRIL) 2.5 MG tablet, Take 2.5 mg by mouth every morning., Disp: , Rfl:     methylPREDNISolone (MEDROL DOSEPACK) 4 mg tablet, Take as directed on ely, Disp: 1 each, Rfl: 0    metoprolol succinate (TOPROL-XL) 50 MG 24 hr tablet, Take 25 mg by mouth once daily., Disp: , Rfl:     spironolactone (ALDACTONE) 25 MG tablet, Take 25 mg by mouth once daily., Disp: , Rfl:     Facility-Administered Medications Ordered in Other Encounters:     LIDOcaine (PF) 10 mg/ml (1%) injection 10 mg, 1 mL, Intradermal, Once, Chi Sanchez DO  VTE Risk Mitigation (From admission, onward)           Ordered     IP VTE HIGH RISK PATIENT  Once         06/27/24 1729     Place sequential compression device  Until discontinued         06/27/24 1729                  Assessment:   Supraventricular tachycardia  Pleuritic chest pain  Pneumonia  Heart failure with BNP 1038.6  History of cardiomyopathy unknown type      Plan:   -patient's chest pain is atypical and appears noncardiac.  His troponin is negative.  Patient's chest x-ray confirmed pneumonia with right lower lobe infiltrate.  Pain is consistent with pleurisy.  Management of chest pain as per primary care team.  - Recommend to start patient on  amiodarone due to persistent unstable blood pressure.  If patient's blood pressure continues to drop in DC cardioversion can be performed.  - continue home medications.  Patient does not appear clinically in heart failure the BNP is elevated.  Continue diuretics once blood pressure is stabilized.  -recommend echocardiogram to evaluate heart function.  -would also like to get records from patient's cardiologist who evaluate type of cardiomyopathy.    Plan discussed with ER physician and patient.    Patient meets ASPEN criteria for   malnutrition of   per RD assessment as evidenced by:                       A minimum of two characteristics is recommended for diagnosis of either severe or non-severe malnutrition.    Thank you for your consult.     Eduardo Taylor MD  Cardiology  [facility] - Emergency Dept  06/27/2024

## 2024-06-28 NOTE — H&P
[facility] - ICU Hospital Medicine  History & Physical    Patient Name: Slick Kamara  MRN: [id number]  Patient Class: IP- Inpatient  Admission Date: 6/27/2024  Attending Physician: Vern Mitchell III, *   Primary Care Provider: Vern Mitchell III, MD         Patient information was obtained from patient and ER records.     Subjective:     Principal Problem:<principal problem not specified>    Chief Complaint:   Chief Complaint   Patient presents with    Shortness of Breath     C/o SOB x 2-3 days and Rt upper back(lung) pain. HR 150s. States h/o tachycardia/SVT.        HPI: 69-year-old pleasant white male with a history of an ischemic cardiomyopathy proximally 10 years ago with a residual depressed EF of a proximally 20-30%.  His cardiologist is Dr. Lou in [place].  He was last echo was about a year ago the patient stated.  I saw him in the office last Wednesday at which point he had a cough but no chest pain.  He was given an order for chest x-ray and performed that yesterday.  It did not show anything concerning but yesterday started having significant chest pain that was right-sided and almost flank as well.  He decided to come in the emergency room and get checked out.  He had on CT a negative CT for PE but a significantly developing early right-sided pneumonia for which he is being admitted.  Patient denies any rigors or fever but is having a cough and pleuritic cough at times that he says is incessantly uncontrollable.      Patient was also in SVT was placed on amiodarone drip and broken to sinus rhythm once he arrived proximally in ICU.  He would received about 2 L of fluids.  He is being kept on the amiodarone protocol.    Past Medical History:   Diagnosis Date    CHF (congestive heart failure)     GERD (gastroesophageal reflux disease)     High cholesterol     HTN (hypertension)        Past Surgical History:   Procedure Laterality Date    COLONOSCOPY  12/28/2016    COLONOSCOPY N/A  07/28/2022    Procedure: COLONOSCOPY;  Surgeon: Vern Mitchell III, MD;  Location: [facility];  Service: Endoscopy;  Laterality: N/A;  Suboptimal prep. Diverticulosis without perforation.    FACIAL FRACTURE SURGERY      HERNIA REPAIR      HIP REPLACEMENT ARTHROPLASTY Left     KNEE ARTHROSCOPY Right     WRIST SURGERY Right        Review of patient's allergies indicates:  No Known Allergies    Current Facility-Administered Medications on File Prior to Encounter   Medication    LIDOcaine (PF) 10 mg/ml (1%) injection 10 mg     Current Outpatient Medications on File Prior to Encounter   Medication Sig    atorvastatin (LIPITOR) 10 MG tablet Take 10 mg by mouth every evening.    furosemide (LASIX) 40 MG tablet Take 40 mg by mouth once daily.    lisinopriL (PRINIVIL,ZESTRIL) 2.5 MG tablet Take 2.5 mg by mouth once daily.    metoprolol succinate (TOPROL-XL) 25 MG 24 hr tablet Take 1 tablet by mouth every morning.    spironolactone (ALDACTONE) 25 MG tablet Take 12.5 mg by mouth every morning.    cetirizine (ZYRTEC) 10 MG tablet Take 1 tablet (10 mg total) by mouth once daily. for 14 days    fluticasone propionate (FLONASE) 50 mcg/actuation nasal spray 2 sprays (100 mcg total) by Each Nostril route once daily.    gabapentin (NEURONTIN) 300 MG capsule Take 1 capsule by mouth 2 (two) times a day.    lisinopriL (PRINIVIL,ZESTRIL) 2.5 MG tablet Take 2.5 mg by mouth every morning.    methylPREDNISolone (MEDROL DOSEPACK) 4 mg tablet Take as directed on ely    metoprolol succinate (TOPROL-XL) 50 MG 24 hr tablet Take 25 mg by mouth once daily.    spironolactone (ALDACTONE) 25 MG tablet Take 25 mg by mouth once daily.     Family History       Problem Relation (Age of Onset)    Breast cancer Mother    Colon cancer Brother    Diabetes Mother    Hyperlipidemia Mother    Hypertension Mother    Pneumonia Father          Tobacco Use    Smoking status: Former    Smokeless tobacco: Never   Substance and Sexual Activity    Alcohol use: Yes      Alcohol/week: 7.0 standard drinks of alcohol     Types: 7 Cans of beer per week    Drug use: Never    Sexual activity: Not on file     Review of Systems   Respiratory:  Positive for cough, chest tightness and shortness of breath.    Cardiovascular:  Positive for chest pain.     Objective:     Vital Signs (Most Recent):  Temp: 98.4 °F (36.9 °C) (06/27/24 2130)  Pulse: 92 (06/27/24 2300)  Resp: (!) 32 (06/27/24 2300)  BP: 115/66 (06/27/24 2245)  SpO2: (!) 94 % (06/27/24 2300) Vital Signs (24h Range):  Temp:  [98.1 °F (36.7 °C)-98.4 °F (36.9 °C)] 98.4 °F (36.9 °C)  Pulse:  [] 92  Resp:  [19-35] 32  SpO2:  [87 %-100 %] 94 %  BP: ()/(52-77) 115/66     Weight: 91.6 kg (202 lb)  Body mass index is 30.26 kg/m².     Physical Exam      On physical exam patient was alert orient x3.  He is in normal sinus rhythm.  He looks uncomfortable when he coughs because of the pain in the right side.  Cranial nerves 2-12 are intact no JVD regular rate and rhythm no rubs gallops or murmurs on an amiodarone drip.  Decreased breath sounds and crackles on the right side.  No flank tenderness.  Abdomen is soft nontender nondistended no clubbing cyanosis or edema.  He was tan all over for his ethnic background.     Significant Labs: All pertinent labs within the past 24 hours have been reviewed.  Recent Lab Results         06/27/24  1656   06/27/24  1615   06/27/24  1429   06/27/24  1427        Influenza A, Molecular       Not Detected       Influenza B, Molecular       Not Detected       Albumin/Globulin Ratio     0.9         Albumin     3.8         ALP     95         ALT     22         Anion Gap     11.0         Appearance, UA Clear             PTT     33.1  Comment: For Minimal Heparin Infusion, the goal aPTT 64-85 seconds corresponds to an anti-Xa of 0.3-0.5.    For Low Intensity and High Intensity Heparin, the goal aPTT  seconds corresponds to an anti-Xa of 0.3-0.7         AST     17         Baso #     0.02          Basophil %     0.1         Bilirubin (UA) Negative             BILIRUBIN TOTAL     2.1         BNP     1,038.6         BUN     21.0         BUN/CREAT RATIO     22         Calcium     9.9         Chloride     100         CO2     21         Color, UA Yellow             Creatinine     0.95         D-Dimer     2.40  Comment: D-dimer values of less than 0.5ug/mL FEU in adult patients with a clinically low pre-test probability of developing a DVT yield  a 99% negative predictive value.  D-dimer increases naturally with age, therefore the negative predictive value in patients older than 80 is 21 - 31%.    D-Dimer testing at [facility] and [facility] is used as an aid in the diagnosis of VTE/PE. The D-Dimer test must be used with one or more additional tests such as imaging studies to evaluate VTE/PE         eGFR     >60         Eos #     0.00         Eos %     0.0         Globulin, Total     4.1         Glucose     132         Glucose, UA Negative             Hematocrit     35.9         Hemoglobin     12.1         Immature Grans (Abs)     0.04         Immature Granulocytes     0.3         INR     1.2         Ketones, UA Negative             Lactic Acid Level   0.9           Leukocyte Esterase, UA Negative             Lymph #     1.19         LYMPH %     8.1         Magnesium      2.30         MCH     31.7         MCHC     33.7         MCV     94.0         Mono #     1.21         Mono %     8.2         MPV     9.9         Neut #     12.24         Neut %     83.3         NITRITE UA Negative             Blood, UA Negative             pH, UA 5.0             Platelet Count     258         Potassium     4.1         PROTEIN TOTAL     7.9         Protein, UA Negative             PT     15.7         RBC     3.82         RDW     13.8         SARS-CoV2 (COVID-19) Qualitative PCR       Not Detected       Sodium     132         Specific Gravity,UA <=1.005             Troponin I     0.017         TSH      1.231         Urobilinogen, UA 0.2             WBC     14.70                 Significant Imaging: I have reviewed all pertinent imaging results/findings within the past 24 hours.  Assessment/Plan:     CHF (congestive heart failure), NYHA class II, chronic, systolic  Patient is identified as having Systolic (HFrEF) heart failure that is Acute on chronic. CHF is currently controlled. Latest ECHO performed and demonstrates- No results found for this or any previous visit.  . Continue Beta Blocker and ACE/ARB and monitor clinical status closely. Monitor on telemetry. Patient is off CHF pathway.  Monitor strict Is&Os and daily weights.  Place on fluid restriction of  patient was seems to be a little on the dry side . Cardiology has been consulted. Continue to stress to patient importance of self efficacy and  on diet for CHF. Last BNP reviewed- and noted below   Recent Labs   Lab 06/27/24  1429   BNP 1,038.6*       Community acquired pneumonia  Patient has a diagnosis of pneumonia. The cause of the pneumonia is suspected to be bacterial in etiology but organism is not known. The pneumonia is improving. The patient has the following signs/symptoms of pneumonia: persistent hypoxia , cough, shortness of breath, and chest pain. The patient does not have a current oxygen requirement and the patient does not have a home oxygen requirement. I have reviewed the pertinent imaging. The following cultures have been collected:  PCR The culture results are listed below.     Current antimicrobial regimen consists of the antibiotics listed below. Will monitor patient closely and Adjust treatment plan as follows I am going to add a 4th generation cephalosporin with Pseudomonas coverage just because of the patient's high-risk.    Antibiotics (From admission, onward)      Start     Stop Route Frequency Ordered    06/28/24 0045  azithromycin (ZITHROMAX) 500 mg in D5W 250 mL IVPB (Vial-Mate)         -- IV Every 24 hours  (non-standard times) 06/27/24 2347    06/28/24 0000  ceFEPIme (MAXIPIME) 2 g in D5W 100 mL IVPB (MB+)  ( Pneumonia - Moderate-High MDR )         07/02/24 2344 IV Every 8 hours (non-standard times) 06/27/24 2347            Microbiology Results (last 7 days)       Procedure Component Value Units Date/Time    Blood culture #1 **CANNOT BE ORDERED STAT** [[id number]] Collected: 06/27/24 1608    Order Status: Sent Specimen: Blood Updated: 06/27/24 1623    Blood culture #2 **CANNOT BE ORDERED STAT** [[id number]] Collected: 06/27/24 1615    Order Status: Sent Specimen: Blood Updated: 06/27/24 1623            Tachycardia    Controlled now on amiodarone drip.  We will follow the algorithm.  Will discuss with Cardiology if this is needed in the long term if the patient's pulmonary condition improves.    Shortness of breath  Will give him some breathing treatments, mucolytics with interval acetylcysteine and supplemental oxygen p.r.n.      SVT (supraventricular tachycardia)    We will continue current algorithm for amiodarone dosing.  Will monitor his heart rates in the ICU.  Restart his beta blocker with him tomorrow.  Likely etiology of this is underlying systolic cardiac congestive heart failure in the setting of challenging hypoxic effects of developing pneumonia.      VTE Risk Mitigation (From admission, onward)           Ordered     enoxaparin injection 40 mg  Daily         06/27/24 2347     IP VTE HIGH RISK PATIENT  Once         06/27/24 2347     Place sequential compression device  Until discontinued         06/27/24 1182                                    Vern Mitchell III, MD  Department of Hospital Medicine  [facility] - ICU

## 2024-06-28 NOTE — SUBJECTIVE & OBJECTIVE
Past Medical History:   Diagnosis Date    CHF (congestive heart failure)     GERD (gastroesophageal reflux disease)     High cholesterol     HTN (hypertension)        Past Surgical History:   Procedure Laterality Date    COLONOSCOPY  12/28/2016    COLONOSCOPY N/A 07/28/2022    Procedure: COLONOSCOPY;  Surgeon: Vern Mitchell III, MD;  Location: [facility];  Service: Endoscopy;  Laterality: N/A;  Suboptimal prep. Diverticulosis without perforation.    FACIAL FRACTURE SURGERY      HERNIA REPAIR      HIP REPLACEMENT ARTHROPLASTY Left     KNEE ARTHROSCOPY Right     WRIST SURGERY Right        Review of patient's allergies indicates:  No Known Allergies    Current Facility-Administered Medications on File Prior to Encounter   Medication    LIDOcaine (PF) 10 mg/ml (1%) injection 10 mg     Current Outpatient Medications on File Prior to Encounter   Medication Sig    atorvastatin (LIPITOR) 10 MG tablet Take 10 mg by mouth every evening.    furosemide (LASIX) 40 MG tablet Take 40 mg by mouth once daily.    lisinopriL (PRINIVIL,ZESTRIL) 2.5 MG tablet Take 2.5 mg by mouth once daily.    metoprolol succinate (TOPROL-XL) 25 MG 24 hr tablet Take 1 tablet by mouth every morning.    spironolactone (ALDACTONE) 25 MG tablet Take 12.5 mg by mouth every morning.    cetirizine (ZYRTEC) 10 MG tablet Take 1 tablet (10 mg total) by mouth once daily. for 14 days    fluticasone propionate (FLONASE) 50 mcg/actuation nasal spray 2 sprays (100 mcg total) by Each Nostril route once daily.    gabapentin (NEURONTIN) 300 MG capsule Take 1 capsule by mouth 2 (two) times a day.    lisinopriL (PRINIVIL,ZESTRIL) 2.5 MG tablet Take 2.5 mg by mouth every morning.    methylPREDNISolone (MEDROL DOSEPACK) 4 mg tablet Take as directed on ely    metoprolol succinate (TOPROL-XL) 50 MG 24 hr tablet Take 25 mg by mouth once daily.    spironolactone (ALDACTONE) 25 MG tablet Take 25 mg by mouth once daily.     Family History       Problem Relation (Age of Onset)     Breast cancer Mother    Colon cancer Brother    Diabetes Mother    Hyperlipidemia Mother    Hypertension Mother    Pneumonia Father          Tobacco Use    Smoking status: Former    Smokeless tobacco: Never   Substance and Sexual Activity    Alcohol use: Yes     Alcohol/week: 7.0 standard drinks of alcohol     Types: 7 Cans of beer per week    Drug use: Never    Sexual activity: Not on file     Review of Systems   Respiratory:  Positive for cough, chest tightness and shortness of breath.    Cardiovascular:  Positive for chest pain.     Objective:     Vital Signs (Most Recent):  Temp: 98.4 °F (36.9 °C) (06/27/24 2130)  Pulse: 92 (06/27/24 2300)  Resp: (!) 32 (06/27/24 2300)  BP: 115/66 (06/27/24 2245)  SpO2: (!) 94 % (06/27/24 2300) Vital Signs (24h Range):  Temp:  [98.1 °F (36.7 °C)-98.4 °F (36.9 °C)] 98.4 °F (36.9 °C)  Pulse:  [] 92  Resp:  [19-35] 32  SpO2:  [87 %-100 %] 94 %  BP: ()/(52-77) 115/66     Weight: 91.6 kg (202 lb)  Body mass index is 30.26 kg/m².     Physical Exam      On physical exam patient was alert orient x3.  He is in normal sinus rhythm.  He looks uncomfortable when he coughs because of the pain in the right side.  Cranial nerves 2-12 are intact no JVD regular rate and rhythm no rubs gallops or murmurs on an amiodarone drip.  Decreased breath sounds and crackles on the right side.  No flank tenderness.  Abdomen is soft nontender nondistended no clubbing cyanosis or edema.  He was tan all over for his ethnic background.     Significant Labs: All pertinent labs within the past 24 hours have been reviewed.  Recent Lab Results         06/27/24  1656   06/27/24  1615   06/27/24  1429   06/27/24  1427        Influenza A, Molecular       Not Detected       Influenza B, Molecular       Not Detected       Albumin/Globulin Ratio     0.9         Albumin     3.8         ALP     95         ALT     22         Anion Gap     11.0         Appearance, UA Clear             PTT     33.1  Comment:  For Minimal Heparin Infusion, the goal aPTT 64-85 seconds corresponds to an anti-Xa of 0.3-0.5.    For Low Intensity and High Intensity Heparin, the goal aPTT  seconds corresponds to an anti-Xa of 0.3-0.7         AST     17         Baso #     0.02         Basophil %     0.1         Bilirubin (UA) Negative             BILIRUBIN TOTAL     2.1         BNP     1,038.6         BUN     21.0         BUN/CREAT RATIO     22         Calcium     9.9         Chloride     100         CO2     21         Color, UA Yellow             Creatinine     0.95         D-Dimer     2.40  Comment: D-dimer values of less than 0.5ug/mL FEU in adult patients with a clinically low pre-test probability of developing a DVT yield  a 99% negative predictive value.  D-dimer increases naturally with age, therefore the negative predictive value in patients older than 80 is 21 - 31%.    D-Dimer testing at [facility] and [facility] is used as an aid in the diagnosis of VTE/PE. The D-Dimer test must be used with one or more additional tests such as imaging studies to evaluate VTE/PE         eGFR     >60         Eos #     0.00         Eos %     0.0         Globulin, Total     4.1         Glucose     132         Glucose, UA Negative             Hematocrit     35.9         Hemoglobin     12.1         Immature Grans (Abs)     0.04         Immature Granulocytes     0.3         INR     1.2         Ketones, UA Negative             Lactic Acid Level   0.9           Leukocyte Esterase, UA Negative             Lymph #     1.19         LYMPH %     8.1         Magnesium      2.30         MCH     31.7         MCHC     33.7         MCV     94.0         Mono #     1.21         Mono %     8.2         MPV     9.9         Neut #     12.24         Neut %     83.3         NITRITE UA Negative             Blood, UA Negative             pH, UA 5.0             Platelet Count     258         Potassium     4.1         PROTEIN TOTAL      7.9         Protein, UA Negative             PT     15.7         RBC     3.82         RDW     13.8         SARS-CoV2 (COVID-19) Qualitative PCR       Not Detected       Sodium     132         Specific Gravity,UA <=1.005             Troponin I     0.017         TSH     1.231         Urobilinogen, UA 0.2             WBC     14.70                 Significant Imaging: I have reviewed all pertinent imaging results/findings within the past 24 hours.

## 2024-06-28 NOTE — ASSESSMENT & PLAN NOTE
Will give him some breathing treatments, mucolytics with interval acetylcysteine and supplemental oxygen p.r.n.

## 2024-06-28 NOTE — ASSESSMENT & PLAN NOTE
Patient has a diagnosis of pneumonia. The cause of the pneumonia is suspected to be bacterial in etiology but organism is not known. The pneumonia is improving. The patient has the following signs/symptoms of pneumonia: persistent hypoxia , cough, shortness of breath, and chest pain. The patient does not have a current oxygen requirement and the patient does not have a home oxygen requirement. I have reviewed the pertinent imaging. The following cultures have been collected:  PCR The culture results are listed below.     Current antimicrobial regimen consists of the antibiotics listed below. Will monitor patient closely and Adjust treatment plan as follows I am going to add a 4th generation cephalosporin with Pseudomonas coverage just because of the patient's high-risk.    Antibiotics (From admission, onward)      Start     Stop Route Frequency Ordered    06/28/24 0045  azithromycin (ZITHROMAX) 500 mg in D5W 250 mL IVPB (Vial-Mate)         -- IV Every 24 hours (non-standard times) 06/27/24 2347    06/28/24 0000  ceFEPIme (MAXIPIME) 2 g in D5W 100 mL IVPB (MB+)  ( Pneumonia - Moderate-High MDR )         07/02/24 2344 IV Every 8 hours (non-standard times) 06/27/24 2347            Microbiology Results (last 7 days)       Procedure Component Value Units Date/Time    Blood culture #1 **CANNOT BE ORDERED STAT** [[id number]] Collected: 06/27/24 1608    Order Status: Sent Specimen: Blood Updated: 06/27/24 1623    Blood culture #2 **CANNOT BE ORDERED STAT** [[id number]] Collected: 06/27/24 1615    Order Status: Sent Specimen: Blood Updated: 06/27/24 1623

## 2024-06-28 NOTE — ASSESSMENT & PLAN NOTE
Controlled now on amiodarone drip.  We will follow the algorithm.  Will discuss with Cardiology if this is needed in the long term if the patient's pulmonary condition improves.

## 2024-06-28 NOTE — ASSESSMENT & PLAN NOTE
We will continue current algorithm for amiodarone dosing.  Will monitor his heart rates in the ICU.  Restart his beta blocker with him tomorrow.  Likely etiology of this is underlying systolic cardiac congestive heart failure in the setting of challenging hypoxic effects of developing pneumonia.

## 2024-06-28 NOTE — PLAN OF CARE
Problem: Adult Inpatient Plan of Care  Goal: Plan of Care Review  Outcome: Met  Goal: Patient-Specific Goal (Individualized)  Outcome: Met  Goal: Absence of Hospital-Acquired Illness or Injury  Outcome: Met  Goal: Optimal Comfort and Wellbeing  Outcome: Met  Goal: Readiness for Transition of Care  Outcome: Met     Problem: Adult Inpatient Plan of Care  Goal: Plan of Care Review  Outcome: Progressing  Goal: Patient-Specific Goal (Individualized)  Outcome: Progressing  Goal: Absence of Hospital-Acquired Illness or Injury  Outcome: Progressing  Goal: Optimal Comfort and Wellbeing  Outcome: Progressing  Goal: Readiness for Transition of Care  Outcome: Progressing     Problem: Comorbidity Management  Goal: Maintenance of Heart Failure Symptom Control  Outcome: Met  Goal: Blood Pressure in Desired Range  Outcome: Met     Problem: Pneumonia  Goal: Fluid Balance  Outcome: Met  Goal: Resolution of Infection Signs and Symptoms  Outcome: Met  Goal: Effective Oxygenation and Ventilation  Outcome: Met     Problem: Pneumonia  Goal: Fluid Balance  Outcome: Progressing  Goal: Resolution of Infection Signs and Symptoms  Outcome: Progressing  Goal: Effective Oxygenation and Ventilation  Outcome: Progressing     Problem: Dysrhythmia  Goal: Normalized Cardiac Rhythm  Outcome: Met     Problem: Dysrhythmia  Goal: Normalized Cardiac Rhythm  Outcome: Progressing     Problem: Fatigue  Goal: Improved Activity Tolerance  Outcome: Progressing

## 2024-06-28 NOTE — PLAN OF CARE
Problem: Adult Inpatient Plan of Care  Goal: Plan of Care Review  Outcome: Progressing  Goal: Patient-Specific Goal (Individualized)  Outcome: Progressing  Goal: Absence of Hospital-Acquired Illness or Injury  Outcome: Progressing  Goal: Optimal Comfort and Wellbeing  Outcome: Progressing  Goal: Readiness for Transition of Care  Outcome: Progressing     Problem: Comorbidity Management  Goal: Maintenance of Heart Failure Symptom Control  Outcome: Progressing  Goal: Blood Pressure in Desired Range  Outcome: Progressing     Problem: Pneumonia  Goal: Fluid Balance  Outcome: Progressing  Goal: Resolution of Infection Signs and Symptoms  Outcome: Progressing  Goal: Effective Oxygenation and Ventilation  Outcome: Progressing     Problem: Dysrhythmia  Goal: Normalized Cardiac Rhythm  Outcome: Progressing     Problem: Dysrhythmia  Goal: Normalized Cardiac Rhythm  Outcome: Progressing

## 2024-06-29 LAB
ALBUMIN SERPL-MCNC: 2.9 G/DL (ref 3.4–4.8)
ALBUMIN/GLOB SERPL: 0.8 RATIO (ref 1.1–2)
ALP SERPL-CCNC: 82 UNIT/L (ref 40–150)
ALT SERPL-CCNC: 27 UNIT/L (ref 0–55)
ANION GAP SERPL CALC-SCNC: 8 MEQ/L
AST SERPL-CCNC: 26 UNIT/L (ref 5–34)
BASOPHILS # BLD AUTO: 0.03 X10(3)/MCL
BASOPHILS NFR BLD AUTO: 0.3 %
BILIRUB SERPL-MCNC: 1.1 MG/DL
BUN SERPL-MCNC: 22 MG/DL (ref 8.4–25.7)
CALCIUM SERPL-MCNC: 8.5 MG/DL (ref 8.8–10)
CHLORIDE SERPL-SCNC: 99 MMOL/L (ref 98–107)
CO2 SERPL-SCNC: 20 MMOL/L (ref 23–31)
CREAT SERPL-MCNC: 0.86 MG/DL (ref 0.73–1.18)
CREAT/UREA NIT SERPL: 26
EOSINOPHIL # BLD AUTO: 0.05 X10(3)/MCL (ref 0–0.9)
EOSINOPHIL NFR BLD AUTO: 0.5 %
ERYTHROCYTE [DISTWIDTH] IN BLOOD BY AUTOMATED COUNT: 13.9 % (ref 11.5–17)
GFR SERPLBLD CREATININE-BSD FMLA CKD-EPI: >60 ML/MIN/1.73/M2
GLOBULIN SER-MCNC: 3.6 GM/DL (ref 2.4–3.5)
GLUCOSE SERPL-MCNC: 134 MG/DL (ref 82–115)
HCT VFR BLD AUTO: 29.3 % (ref 42–52)
HGB BLD-MCNC: 9.9 G/DL (ref 14–18)
IMM GRANULOCYTES # BLD AUTO: 0.04 X10(3)/MCL (ref 0–0.04)
IMM GRANULOCYTES NFR BLD AUTO: 0.4 %
LYMPHOCYTES # BLD AUTO: 1.55 X10(3)/MCL (ref 0.6–4.6)
LYMPHOCYTES NFR BLD AUTO: 14.7 %
MAGNESIUM SERPL-MCNC: 2.4 MG/DL (ref 1.6–2.6)
MCH RBC QN AUTO: 31.9 PG (ref 27–31)
MCHC RBC AUTO-ENTMCNC: 33.8 G/DL (ref 33–36)
MCV RBC AUTO: 94.5 FL (ref 80–94)
MONOCYTES # BLD AUTO: 1.09 X10(3)/MCL (ref 0.1–1.3)
MONOCYTES NFR BLD AUTO: 10.3 %
NEUTROPHILS # BLD AUTO: 7.8 X10(3)/MCL (ref 2.1–9.2)
NEUTROPHILS NFR BLD AUTO: 73.8 %
OHS QRS DURATION: 98 MS
OHS QTC CALCULATION: 453 MS
PHOSPHATE SERPL-MCNC: 2.6 MG/DL (ref 2.3–4.7)
PLATELET # BLD AUTO: 204 X10(3)/MCL (ref 130–400)
PMV BLD AUTO: 10.6 FL (ref 7.4–10.4)
POTASSIUM SERPL-SCNC: 4 MMOL/L (ref 3.5–5.1)
PROT SERPL-MCNC: 6.5 GM/DL (ref 5.8–7.6)
RBC # BLD AUTO: 3.1 X10(6)/MCL (ref 4.7–6.1)
SODIUM SERPL-SCNC: 127 MMOL/L (ref 136–145)
WBC # BLD AUTO: 10.56 X10(3)/MCL (ref 4.5–11.5)

## 2024-06-29 PROCEDURE — 80053 COMPREHEN METABOLIC PANEL: CPT | Performed by: INTERNAL MEDICINE

## 2024-06-29 PROCEDURE — 63600175 PHARM REV CODE 636 W HCPCS: Performed by: INTERNAL MEDICINE

## 2024-06-29 PROCEDURE — 25000003 PHARM REV CODE 250: Performed by: INTERNAL MEDICINE

## 2024-06-29 PROCEDURE — 25000242 PHARM REV CODE 250 ALT 637 W/ HCPCS: Performed by: INTERNAL MEDICINE

## 2024-06-29 PROCEDURE — 84100 ASSAY OF PHOSPHORUS: CPT | Performed by: INTERNAL MEDICINE

## 2024-06-29 PROCEDURE — 94761 N-INVAS EAR/PLS OXIMETRY MLT: CPT

## 2024-06-29 PROCEDURE — 85025 COMPLETE CBC W/AUTO DIFF WBC: CPT | Performed by: INTERNAL MEDICINE

## 2024-06-29 PROCEDURE — 20000000 HC ICU ROOM

## 2024-06-29 PROCEDURE — 36415 COLL VENOUS BLD VENIPUNCTURE: CPT | Performed by: INTERNAL MEDICINE

## 2024-06-29 PROCEDURE — 94640 AIRWAY INHALATION TREATMENT: CPT

## 2024-06-29 PROCEDURE — 83735 ASSAY OF MAGNESIUM: CPT | Performed by: INTERNAL MEDICINE

## 2024-06-29 RX ORDER — ALPRAZOLAM 0.5 MG/1
0.5 TABLET ORAL ONCE
Status: COMPLETED | OUTPATIENT
Start: 2024-06-29 | End: 2024-06-29

## 2024-06-29 RX ORDER — SODIUM CHLORIDE 9 MG/ML
INJECTION, SOLUTION INTRAVENOUS CONTINUOUS
Status: DISCONTINUED | OUTPATIENT
Start: 2024-06-29 | End: 2024-07-01

## 2024-06-29 RX ORDER — ALPRAZOLAM 0.5 MG/1
0.5 TABLET ORAL ONCE
Status: DISCONTINUED | OUTPATIENT
Start: 2024-06-29 | End: 2024-06-29

## 2024-06-29 RX ADMIN — CEFEPIME 2 G: 2 INJECTION, POWDER, FOR SOLUTION INTRAVENOUS at 08:06

## 2024-06-29 RX ADMIN — IPRATROPIUM BROMIDE AND ALBUTEROL SULFATE 3 ML: .5; 3 SOLUTION RESPIRATORY (INHALATION) at 07:06

## 2024-06-29 RX ADMIN — MUPIROCIN 1 G: 20 OINTMENT TOPICAL at 09:06

## 2024-06-29 RX ADMIN — ATORVASTATIN CALCIUM 10 MG: 10 TABLET, FILM COATED ORAL at 09:06

## 2024-06-29 RX ADMIN — AZITHROMYCIN MONOHYDRATE 500 MG: 500 INJECTION, POWDER, LYOPHILIZED, FOR SOLUTION INTRAVENOUS at 12:06

## 2024-06-29 RX ADMIN — MUPIROCIN 1 G: 20 OINTMENT TOPICAL at 08:06

## 2024-06-29 RX ADMIN — ACETYLCYSTEINE 2 ML: 200 INHALANT RESPIRATORY (INHALATION) at 07:06

## 2024-06-29 RX ADMIN — CEFEPIME 2 G: 2 INJECTION, POWDER, FOR SOLUTION INTRAVENOUS at 12:06

## 2024-06-29 RX ADMIN — BENZONATATE 200 MG: 100 CAPSULE ORAL at 09:06

## 2024-06-29 RX ADMIN — IPRATROPIUM BROMIDE AND ALBUTEROL SULFATE 3 ML: .5; 3 SOLUTION RESPIRATORY (INHALATION) at 01:06

## 2024-06-29 RX ADMIN — SODIUM CHLORIDE: 9 INJECTION, SOLUTION INTRAVENOUS at 02:06

## 2024-06-29 RX ADMIN — PANTOPRAZOLE SODIUM 40 MG: 40 TABLET, DELAYED RELEASE ORAL at 08:06

## 2024-06-29 RX ADMIN — ENOXAPARIN SODIUM 40 MG: 40 INJECTION SUBCUTANEOUS at 04:06

## 2024-06-29 RX ADMIN — LISINOPRIL 2.5 MG: 2.5 TABLET ORAL at 07:06

## 2024-06-29 RX ADMIN — HYDROCODONE BITARTRATE AND ACETAMINOPHEN 15 ML: 7.5; 325 SOLUTION ORAL at 09:06

## 2024-06-29 RX ADMIN — ALPRAZOLAM 0.5 MG: 0.5 TABLET ORAL at 03:06

## 2024-06-29 RX ADMIN — METOPROLOL SUCCINATE 25 MG: 25 TABLET, EXTENDED RELEASE ORAL at 08:06

## 2024-06-29 RX ADMIN — CEFEPIME 2 G: 2 INJECTION, POWDER, FOR SOLUTION INTRAVENOUS at 04:06

## 2024-06-29 NOTE — PLAN OF CARE
Problem: Adult Inpatient Plan of Care  Goal: Plan of Care Review  Outcome: Progressing  Goal: Patient-Specific Goal (Individualized)  Outcome: Progressing  Goal: Absence of Hospital-Acquired Illness or Injury  Outcome: Progressing  Goal: Optimal Comfort and Wellbeing  Outcome: Progressing  Goal: Readiness for Transition of Care  Outcome: Progressing     Problem: Comorbidity Management  Goal: Maintenance of Heart Failure Symptom Control  Outcome: Progressing  Goal: Blood Pressure in Desired Range  Outcome: Met     Problem: Pneumonia  Goal: Fluid Balance  Outcome: Progressing  Goal: Resolution of Infection Signs and Symptoms  Outcome: Progressing  Goal: Effective Oxygenation and Ventilation  Outcome: Met     Problem: Dysrhythmia  Goal: Normalized Cardiac Rhythm  Outcome: Met     Problem: Fatigue  Goal: Improved Activity Tolerance  Outcome: Progressing

## 2024-06-29 NOTE — NURSING
1515 patient c/o tunnel vision to right eye and partial left. No c/o headache or pain anywhere. No changes in vital sgns. Dr. Coelho notified. New orders noted.

## 2024-06-29 NOTE — PLAN OF CARE
Problem: Pneumonia  Goal: Fluid Balance  Outcome: Progressing  Goal: Resolution of Infection Signs and Symptoms  Outcome: Progressing     Problem: Comorbidity Management  Goal: Maintenance of Heart Failure Symptom Control  Outcome: Progressing     Problem: Adult Inpatient Plan of Care  Goal: Plan of Care Review  Outcome: Progressing  Goal: Patient-Specific Goal (Individualized)  Outcome: Progressing  Goal: Absence of Hospital-Acquired Illness or Injury  Outcome: Progressing  Goal: Optimal Comfort and Wellbeing  Outcome: Progressing  Goal: Readiness for Transition of Care  Outcome: Progressing     Problem: Fatigue  Goal: Improved Activity Tolerance  Outcome: Progressing

## 2024-06-29 NOTE — PROGRESS NOTES
[facility]  [address] [address]  Phone: [phone]    (Hospital) Internal Medicine  Progress Note      PATIENT NAME: Slick Kamara  MRN: [id number]  TODAY'S DATE: 06/28/2024  ADMIT DATE: 6/27/2024    SUBJECTIVE     PRINCIPLE PROBLEM: <principal problem not specified>    INTERVAL HISTORY:    6/28/2024  Patient tolerated being off of amiodarone now.  He is in sinus rhythm.  He feels better from the cardiac standpoint no chest pain but he is having lateral chest wall pain were then pneumonia has developed.  Upper respiratory PCR came back negative.  He is having slightly more productive cough now that he is on Mucomyst and mucolytics.  Still feels weak.  He gets up out of the chair and moves the bed his heart rates scope in the 1 teens to 120s.  Echo unfortunately today came back positive for an EF of 10-15%    Of note, the patient for the last year has been drinking 8 bloody Tran's per day    Review of patient's allergies indicates:  No Known Allergies    ROS    13 point review of systems are negative except as mentioned above    OBJECTIVE     VITAL SIGNS (Most Recent)  Temp: 98.4 °F (36.9 °C) (06/28/24 1910)  Pulse: 96 (06/28/24 1915)  Resp: (!) 30 (06/28/24 1915)  BP: 117/80 (06/28/24 1900)  SpO2: 97 % (06/28/24 1915)    VENTILATION STATUS  Resp: (!) 30 (06/28/24 1915)  SpO2: 97 % (06/28/24 1915)           I & O (Last 24H):  Intake/Output Summary (Last 24 hours) at 6/28/2024 1932  Last data filed at 6/28/2024 1736  Gross per 24 hour   Intake 3695.3 ml   Output 725 ml   Net 2970.3 ml       WEIGHTS  Wt Readings from Last 3 Encounters:   06/27/24 1428 91.6 kg (202 lb)   11/15/23 0401 96.6 kg (213 lb)   09/08/22 0124 88.6 kg (195 lb 6.4 oz)       Physical Exam    On physical exam he is alert orient x3.  Nurse Chyna and Renetta are present.  No signs of withdrawals from alcohol.  He is cranial nerves 2-12 are intact.  No JVD.  Irregular rate and rhythm no rubs gallops or  murmurs clear to auscultation in the left right shows crackles at the base.  Abdomen is soft nontender nondistended no clubbing cyanosis or edema bilateral lower extremities.    SCHEDULED MEDS:   atorvastatin  10 mg Oral QHS    azithromycin  500 mg Intravenous Q24H    ceFEPime IV (PEDS and ADULTS)  2 g Intravenous Q8H    [START ON 6/29/2024] docusate  50 mg Oral Daily    enoxparin  40 mg Subcutaneous Daily    lisinopriL  2.5 mg Oral QAM    metoprolol succinate  25 mg Oral Daily    mupirocin   Nasal BID       CONTINUOUS INFUSIONS:   lactated ringers   Intravenous Continuous 50 mL/hr at 06/28/24 1736 Rate Verify at 06/28/24 1736       PRN MEDS:  Current Facility-Administered Medications:     acetylcysteine 200 mg/ml (20%), 2 mL, Nebulization, Q6H PRN    albuterol-ipratropium, 3 mL, Nebulization, Q6H PRN    benzonatate, 200 mg, Oral, TID PRN    bisacodyL, 10 mg, Rectal, Daily PRN    hydrocodone-apap 7.5-325 MG/15 ML, 15 mL, Oral, Q6H PRN    lorazepam, 2 mg, Intravenous, Q4H PRN    melatonin, 6 mg, Oral, Nightly PRN    morphine, 1 mg, Intravenous, Q4H PRN    ondansetron, 4 mg, Intravenous, Q8H PRN    sodium chloride 0.9%, 10 mL, Intravenous, PRN    sodium chloride 0.9%, 10 mL, Intravenous, Q12H PRN    LABS AND DIAGNOSTICS     CBC LAST 3 DAYS  Recent Labs   Lab 06/27/24  1429 06/28/24  0404   WBC 14.70* 13.32*   RBC 3.82* 3.31*   HGB 12.1* 10.5*   HCT 35.9* 31.8*   MCV 94.0 96.1*   MCH 31.7* 31.7*   MCHC 33.7 33.0   RDW 13.8 13.9    213   MPV 9.9 10.6*       COAGULATION LAST 3 DAYS  Recent Labs   Lab 06/27/24  1429   INR 1.2   APTT 33.1       CHEMISTRY LAST 3 DAYS  Recent Labs   Lab 06/27/24  1429 06/28/24  0404   * 129*   K 4.1 4.1    101   CO2 21* 20*   BUN 21.0 24.0   CREATININE 0.95 0.94   CALCIUM 9.9 9.0   MG 2.30 2.30   ALBUMIN 3.8 3.1*   ALKPHOS 95 88   ALT 22 20   AST 17 20   BILITOT 2.1* 0.7       CARDIAC PROFILE LAST 3 DAYS  Recent Labs   Lab 06/27/24  1429   BNP 1,038.6*   TROPONINI 0.017  "      ENDOCRINE LAST 3 DAYS  Recent Labs   Lab 06/27/24  1429   TSH 1.231       LAST ARTERIAL BLOOD GAS  ABG  No results for input(s): "PH", "PO2", "PCO2", "HCO3", "BE" in the last 168 hours.    LAST 7 DAYS MICROBIOLOGY   Microbiology Results (last 7 days)       Procedure Component Value Units Date/Time    Blood culture #1 **CANNOT BE ORDERED STAT** [[id number]] Collected: 06/27/24 1608    Order Status: Resulted Specimen: Blood Updated: 06/27/24 1623    Blood culture #2 **CANNOT BE ORDERED STAT** [[id number]] Collected: 06/27/24 1615    Order Status: Resulted Specimen: Blood Updated: 06/27/24 1623            MOST RECENT IMAGING  Echo    Left Ventricle: The left ventricle is moderately dilated. Increased   wall thickness. Severe global hypokinesis present. There is severely   reduced systolic function with a visually estimated ejection fraction of   10 -15%.    Right Ventricle: Mild right ventricular enlargement.    Left Atrium: Left atrium is moderately dilated.    Right Atrium: Right atrium is mildly dilated.    Aortic Valve: The aortic valve is a trileaflet valve. Mildly calcified   cusps.    Mitral Valve: There is mild regurgitation.    Pulmonic Valve: There is moderate regurgitation.      LASTECHO  Results for orders placed during the hospital encounter of 06/27/24    Echo    Interpretation Summary    Left Ventricle: The left ventricle is moderately dilated. Increased wall thickness. Severe global hypokinesis present. There is severely reduced systolic function with a visually estimated ejection fraction of 10 -15%.    Right Ventricle: Mild right ventricular enlargement.    Left Atrium: Left atrium is moderately dilated.    Right Atrium: Right atrium is mildly dilated.    Aortic Valve: The aortic valve is a trileaflet valve. Mildly calcified cusps.    Mitral Valve: There is mild regurgitation.    Pulmonic Valve: There is moderate regurgitation.      CURRENT/PREVIOUS VISIT EKG  Results for orders placed or " performed during the hospital encounter of 06/27/24   EKG 12-lead    Collection Time: 06/27/24  2:19 PM   Result Value Ref Range    QRS Duration 98 ms    [facility] QTC Calculation 453 ms    Narrative    Test Reason : R00.0,    Vent. Rate : 147 BPM     Atrial Rate : 030 BPM     P-R Int : 000 ms          QRS Dur : 098 ms      QT Int : 290 ms       P-R-T Axes : 000 -47 041 degrees     QTc Int : 453 ms    Supraventricular tachycardia  Left axis deviation  Nonspecific ST abnormality  Abnormal ECG  When compared with ECG of 15-NOV-2023 04:20,  OK interval has decreased  Vent. rate has increased BY  72 BPM  T wave inversion no longer evident in Lateral leads    Referred By: [email or web address]   SELF           Confirmed By:        ASSESSMENT/PLAN:     Active Hospital Problems    Diagnosis    SVT (supraventricular tachycardia)    Shortness of breath    Tachycardia    Community acquired pneumonia    Acute on chronic systolic congestive heart failure, NYHA class 3       ASSESSMENT & PLAN:   Patient was off amiodarone drip.  Appreciate [facility] recommendations.  Will titrate bed beta blockers over the weekend.    Shortness breath is okay not requiring oxygen but 90-92%.    Continue cefepime for his presumed bacterial and a acquired community-acquired pneumonia.      Judicial fluid administration in the setting of pneumonia and EF of 10-15%.  For right now he seems to be euvolemic.  F2 DVT prophylaxis is with enoxaparin GI prophylaxis with starting famotidine      RECOMMENDATIONS:  See above        Vern Mitchell III, MD  Department of Hospital Medicine ([facility])   Date of Service: 06/28/2024  7:30 PM

## 2024-06-30 LAB
ANION GAP SERPL CALC-SCNC: 9 MEQ/L
ANION GAP SERPL CALC-SCNC: 9 MEQ/L
BASOPHILS # BLD AUTO: 0.03 X10(3)/MCL
BASOPHILS NFR BLD AUTO: 0.3 %
BNP BLD-MCNC: 1119.3 PG/ML
BUN SERPL-MCNC: 24 MG/DL (ref 8.4–25.7)
BUN SERPL-MCNC: 25 MG/DL (ref 8.4–25.7)
CALCIUM SERPL-MCNC: 8.7 MG/DL (ref 8.8–10)
CALCIUM SERPL-MCNC: 8.9 MG/DL (ref 8.8–10)
CHLORIDE SERPL-SCNC: 96 MMOL/L (ref 98–107)
CHLORIDE SERPL-SCNC: 96 MMOL/L (ref 98–107)
CK MB SERPL-MCNC: 1.2 NG/ML
CK SERPL-CCNC: 61 U/L (ref 30–200)
CO2 SERPL-SCNC: 19 MMOL/L (ref 23–31)
CO2 SERPL-SCNC: 20 MMOL/L (ref 23–31)
CREAT SERPL-MCNC: 0.79 MG/DL (ref 0.73–1.18)
CREAT SERPL-MCNC: 0.92 MG/DL (ref 0.73–1.18)
CREAT/UREA NIT SERPL: 26
CREAT/UREA NIT SERPL: 32
EOSINOPHIL # BLD AUTO: 0.04 X10(3)/MCL (ref 0–0.9)
EOSINOPHIL NFR BLD AUTO: 0.4 %
ERYTHROCYTE [DISTWIDTH] IN BLOOD BY AUTOMATED COUNT: 13.7 % (ref 11.5–17)
GFR SERPLBLD CREATININE-BSD FMLA CKD-EPI: >60 ML/MIN/1.73/M2
GFR SERPLBLD CREATININE-BSD FMLA CKD-EPI: >60 ML/MIN/1.73/M2
GLUCOSE SERPL-MCNC: 128 MG/DL (ref 82–115)
GLUCOSE SERPL-MCNC: 164 MG/DL (ref 82–115)
HCT VFR BLD AUTO: 29.8 % (ref 42–52)
HGB BLD-MCNC: 10 G/DL (ref 14–18)
IMM GRANULOCYTES # BLD AUTO: 0.04 X10(3)/MCL (ref 0–0.04)
IMM GRANULOCYTES NFR BLD AUTO: 0.4 %
LYMPHOCYTES # BLD AUTO: 1.55 X10(3)/MCL (ref 0.6–4.6)
LYMPHOCYTES NFR BLD AUTO: 16 %
MAGNESIUM SERPL-MCNC: 2.4 MG/DL (ref 1.6–2.6)
MCH RBC QN AUTO: 31.3 PG (ref 27–31)
MCHC RBC AUTO-ENTMCNC: 33.6 G/DL (ref 33–36)
MCV RBC AUTO: 93.4 FL (ref 80–94)
MONOCYTES # BLD AUTO: 0.95 X10(3)/MCL (ref 0.1–1.3)
MONOCYTES NFR BLD AUTO: 9.8 %
NEUTROPHILS # BLD AUTO: 7.09 X10(3)/MCL (ref 2.1–9.2)
NEUTROPHILS NFR BLD AUTO: 73.1 %
OHS QRS DURATION: 104 MS
OHS QTC CALCULATION: 449 MS
PHOSPHATE SERPL-MCNC: 2.3 MG/DL (ref 2.3–4.7)
PLATELET # BLD AUTO: 232 X10(3)/MCL (ref 130–400)
PMV BLD AUTO: 10.7 FL (ref 7.4–10.4)
POTASSIUM SERPL-SCNC: 4.2 MMOL/L (ref 3.5–5.1)
POTASSIUM SERPL-SCNC: 4.2 MMOL/L (ref 3.5–5.1)
RBC # BLD AUTO: 3.19 X10(6)/MCL (ref 4.7–6.1)
SODIUM SERPL-SCNC: 124 MMOL/L (ref 136–145)
SODIUM SERPL-SCNC: 125 MMOL/L (ref 136–145)
TROPONIN I SERPL-MCNC: 0.02 NG/ML (ref 0–0.04)
WBC # BLD AUTO: 9.7 X10(3)/MCL (ref 4.5–11.5)

## 2024-06-30 PROCEDURE — 11000001 HC ACUTE MED/SURG PRIVATE ROOM

## 2024-06-30 PROCEDURE — 36415 COLL VENOUS BLD VENIPUNCTURE: CPT | Performed by: INTERNAL MEDICINE

## 2024-06-30 PROCEDURE — 94761 N-INVAS EAR/PLS OXIMETRY MLT: CPT

## 2024-06-30 PROCEDURE — 93005 ELECTROCARDIOGRAM TRACING: CPT

## 2024-06-30 PROCEDURE — 25000242 PHARM REV CODE 250 ALT 637 W/ HCPCS: Performed by: INTERNAL MEDICINE

## 2024-06-30 PROCEDURE — 25000003 PHARM REV CODE 250: Performed by: INTERNAL MEDICINE

## 2024-06-30 PROCEDURE — 85025 COMPLETE CBC W/AUTO DIFF WBC: CPT | Performed by: INTERNAL MEDICINE

## 2024-06-30 PROCEDURE — 82550 ASSAY OF CK (CPK): CPT | Performed by: INTERNAL MEDICINE

## 2024-06-30 PROCEDURE — 94640 AIRWAY INHALATION TREATMENT: CPT

## 2024-06-30 PROCEDURE — 82553 CREATINE MB FRACTION: CPT | Performed by: INTERNAL MEDICINE

## 2024-06-30 PROCEDURE — 63600175 PHARM REV CODE 636 W HCPCS: Performed by: INTERNAL MEDICINE

## 2024-06-30 PROCEDURE — 80048 BASIC METABOLIC PNL TOTAL CA: CPT | Performed by: INTERNAL MEDICINE

## 2024-06-30 PROCEDURE — 83735 ASSAY OF MAGNESIUM: CPT | Performed by: INTERNAL MEDICINE

## 2024-06-30 PROCEDURE — 83880 ASSAY OF NATRIURETIC PEPTIDE: CPT | Performed by: INTERNAL MEDICINE

## 2024-06-30 PROCEDURE — 93010 ELECTROCARDIOGRAM REPORT: CPT | Mod: ,,, | Performed by: INTERNAL MEDICINE

## 2024-06-30 PROCEDURE — 84484 ASSAY OF TROPONIN QUANT: CPT | Performed by: INTERNAL MEDICINE

## 2024-06-30 PROCEDURE — 84100 ASSAY OF PHOSPHORUS: CPT | Performed by: INTERNAL MEDICINE

## 2024-06-30 PROCEDURE — 21400001 HC TELEMETRY ROOM

## 2024-06-30 RX ORDER — ALPRAZOLAM 0.5 MG/1
0.5 TABLET ORAL 3 TIMES DAILY PRN
Status: DISCONTINUED | OUTPATIENT
Start: 2024-06-30 | End: 2024-07-03 | Stop reason: HOSPADM

## 2024-06-30 RX ORDER — ACETAMINOPHEN 325 MG/1
650 TABLET ORAL EVERY 6 HOURS PRN
Status: DISCONTINUED | OUTPATIENT
Start: 2024-06-30 | End: 2024-07-03 | Stop reason: HOSPADM

## 2024-06-30 RX ADMIN — PANTOPRAZOLE SODIUM 40 MG: 40 TABLET, DELAYED RELEASE ORAL at 08:06

## 2024-06-30 RX ADMIN — ALPRAZOLAM 0.5 MG: 0.5 TABLET ORAL at 08:06

## 2024-06-30 RX ADMIN — SODIUM CHLORIDE: 9 INJECTION, SOLUTION INTRAVENOUS at 06:06

## 2024-06-30 RX ADMIN — BENZONATATE 200 MG: 100 CAPSULE ORAL at 03:06

## 2024-06-30 RX ADMIN — CEFEPIME 2 G: 2 INJECTION, POWDER, FOR SOLUTION INTRAVENOUS at 04:06

## 2024-06-30 RX ADMIN — ACETYLCYSTEINE 2 ML: 200 INHALANT RESPIRATORY (INHALATION) at 12:06

## 2024-06-30 RX ADMIN — CEFEPIME 2 G: 2 INJECTION, POWDER, FOR SOLUTION INTRAVENOUS at 08:06

## 2024-06-30 RX ADMIN — METOPROLOL SUCCINATE 25 MG: 25 TABLET, EXTENDED RELEASE ORAL at 08:06

## 2024-06-30 RX ADMIN — Medication 6 MG: at 10:06

## 2024-06-30 RX ADMIN — ACETYLCYSTEINE 2 ML: 200 INHALANT RESPIRATORY (INHALATION) at 08:06

## 2024-06-30 RX ADMIN — ALPRAZOLAM 0.5 MG: 0.5 TABLET ORAL at 10:06

## 2024-06-30 RX ADMIN — SODIUM CHLORIDE: 9 INJECTION, SOLUTION INTRAVENOUS at 11:06

## 2024-06-30 RX ADMIN — CEFEPIME 2 G: 2 INJECTION, POWDER, FOR SOLUTION INTRAVENOUS at 12:06

## 2024-06-30 RX ADMIN — MORPHINE SULFATE 1 MG: 4 INJECTION INTRAVENOUS at 01:06

## 2024-06-30 RX ADMIN — IPRATROPIUM BROMIDE AND ALBUTEROL SULFATE 3 ML: .5; 3 SOLUTION RESPIRATORY (INHALATION) at 09:06

## 2024-06-30 RX ADMIN — IPRATROPIUM BROMIDE AND ALBUTEROL SULFATE 3 ML: .5; 3 SOLUTION RESPIRATORY (INHALATION) at 12:06

## 2024-06-30 RX ADMIN — IPRATROPIUM BROMIDE AND ALBUTEROL SULFATE 3 ML: .5; 3 SOLUTION RESPIRATORY (INHALATION) at 08:06

## 2024-06-30 RX ADMIN — LISINOPRIL 2.5 MG: 2.5 TABLET ORAL at 07:06

## 2024-06-30 RX ADMIN — MUPIROCIN 1 G: 20 OINTMENT TOPICAL at 10:06

## 2024-06-30 RX ADMIN — ATORVASTATIN CALCIUM 10 MG: 10 TABLET, FILM COATED ORAL at 10:06

## 2024-06-30 RX ADMIN — IPRATROPIUM BROMIDE AND ALBUTEROL SULFATE 3 ML: .5; 3 SOLUTION RESPIRATORY (INHALATION) at 01:06

## 2024-06-30 RX ADMIN — MUPIROCIN 1 G: 20 OINTMENT TOPICAL at 08:06

## 2024-06-30 RX ADMIN — HYDROCODONE BITARTRATE AND ACETAMINOPHEN 15 ML: 7.5; 325 SOLUTION ORAL at 03:06

## 2024-06-30 RX ADMIN — ENOXAPARIN SODIUM 40 MG: 40 INJECTION SUBCUTANEOUS at 05:06

## 2024-06-30 RX ADMIN — AZITHROMYCIN MONOHYDRATE 500 MG: 500 INJECTION, POWDER, LYOPHILIZED, FOR SOLUTION INTRAVENOUS at 12:06

## 2024-06-30 NOTE — NURSING
C/O pain to left upper chest. V.S. unchanged, pain more pronounced with coughing. Requested warm compress. Warm compress given.

## 2024-06-30 NOTE — NURSING
Pt transfer from ICU in stable condition     Orientated to room     PT walking around room looking out of weekend

## 2024-06-30 NOTE — NURSING
Cardiac enzymes resulted and normal. Spoke with Dr. Coelho and notified of events and actions taken thus far. Xanax 0.5mg po TID prn ordered.

## 2024-06-30 NOTE — NURSING
"C/O chest pain "where my heart is". Denies nausea or vomiting. Denies radiating down arm, jaw bone or back. No changes noted to rhythm on cardiac monitor. SR-93 bpm. Resp- 24 and unlabored. O2 sat 98% on RA. Stat EKG and cardiac enzymes ordered.  "

## 2024-06-30 NOTE — NURSING
"Current EKG compared with all EKG's in patients chart. No visible changes to my eye noted in any of the leads other than the rate. Discussed this with patient, still awaiting labs. States pain is still present, describing now as a sharp pain while taking deep breaths. Explained this could be in relation to his pneumonia. States "I dont know its still there, I think its getting worse". No changes noted to cardiac monitor.  "

## 2024-06-30 NOTE — PROGRESS NOTES
[facility]                     [address] [address]    PATIENT NAME:       AARON FLYNN  YOB: 1955  CSN:                [id number]   MRN:                [id number]  ADMIT DATE:         06/27/2024 14:16:00  PHYSICIAN:          Navin Coelho MD                            PROGRESS NOTE    DATE:      CODE STATUS:  Full code.    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 Clemente had complained that the patient was having some   colorful scotomas for which a CAT scan was done, before that the complaint was   that he had tunnel vision. The CAT scan has been negative by the time I saw the   patient couple hours later once the CAT scan was done. The patient says the   symptoms are resolving. They were in the peripheral sphere of both the eyes, but   there were some black fields with some colored spots, which have now nearly   totally resolved. The patient was sitting in the chair and was in no discomfort.    OBJECTIVE:  VITAL SIGNS:  Blood pressure 107/71, 86 pulse, temperature 97.9, 96%   O2 sat on room air.  HEENT:  Head is normocephalic. Pupils  bilaterally react to light and equal in   size. Mild conjunctival pallor. No scleral icterus.  NECK: Trachea is in midline   CHEST: Has good bilateral air entry with scattered crepitations and rhonchi.  CVS: First and second heart sounds are heard normally with soft systolic   ejection murmurs.  ABDOMEN:  Soft, nontender.   EXTREMITIES: Devoid of any significant edema.    LABS AND INVESTIGATIONS:  White cell count is down from 14.7 to 10.56,   hemoglobin down from 12.1 to 9.9, hematocrit 29.3, MCV 94.9.  Sodium 127,   potassium 4.0, chloride 99, bicarb 20, BUN  22, creatinine 0.86, GFR of more than   60, glucose 134, and calcium 8.5.      CAT scan of the head without contrast negative. The CAT scan of the chest on   admission has shown patchy hazy opacification of the majority of the right lower   lobe, suspicious for infiltrate, atelectasis in the small posterior bibasilar   pleural reaction, cardiomegaly.    PLAN:  To continue the IV antibiotics, both cefepime and Zithromax. Continue   with the medications for his cardiomyopathy, which is the ACE inhibitors and   beta-blockers. GI prophylaxis with proton pump inhibitors.  DVT prophylaxis with   Lovenox.    I had pleasure taking care of Mr. Slick Cox during his stay at [facility] in ICU. Total time taken 42 minutes.        ______________________________  Navin Coelho MD    SS/ABDIRAHMAN  DD:  06/29/2024  Time:  06:12PM  DT:  06/29/2024  Time:  08:19PM  Job #:  [id number]      PROGRESS NOTE

## 2024-06-30 NOTE — PLAN OF CARE
Problem: Adult Inpatient Plan of Care  Goal: Plan of Care Review  Outcome: Progressing  Goal: Patient-Specific Goal (Individualized)  Outcome: Progressing  Goal: Absence of Hospital-Acquired Illness or Injury  Outcome: Progressing  Goal: Optimal Comfort and Wellbeing  Outcome: Progressing  Goal: Readiness for Transition of Care  Outcome: Progressing     Problem: Comorbidity Management  Goal: Maintenance of Heart Failure Symptom Control  Outcome: Progressing     Problem: Pneumonia  Goal: Fluid Balance  Outcome: Progressing  Goal: Resolution of Infection Signs and Symptoms  Outcome: Progressing     Problem: Fatigue  Goal: Improved Activity Tolerance  Outcome: Progressing

## 2024-07-01 LAB
ALBUMIN SERPL-MCNC: 2.6 G/DL (ref 3.4–4.8)
ALBUMIN/GLOB SERPL: 0.7 RATIO (ref 1.1–2)
ALP SERPL-CCNC: 111 UNIT/L (ref 40–150)
ALT SERPL-CCNC: 59 UNIT/L (ref 0–55)
ANION GAP SERPL CALC-SCNC: 9 MEQ/L
ANION GAP SERPL CALC-SCNC: 9 MEQ/L
AST SERPL-CCNC: 43 UNIT/L (ref 5–34)
BASOPHILS # BLD AUTO: 0.04 X10(3)/MCL
BASOPHILS NFR BLD AUTO: 0.5 %
BILIRUB SERPL-MCNC: 0.7 MG/DL
BUN SERPL-MCNC: 22 MG/DL (ref 8.4–25.7)
BUN SERPL-MCNC: 25 MG/DL (ref 8.4–25.7)
CALCIUM SERPL-MCNC: 8.5 MG/DL (ref 8.8–10)
CALCIUM SERPL-MCNC: 9.2 MG/DL (ref 8.8–10)
CHLORIDE SERPL-SCNC: 92 MMOL/L (ref 98–107)
CHLORIDE SERPL-SCNC: 96 MMOL/L (ref 98–107)
CO2 SERPL-SCNC: 18 MMOL/L (ref 23–31)
CO2 SERPL-SCNC: 22 MMOL/L (ref 23–31)
CREAT SERPL-MCNC: 0.77 MG/DL (ref 0.73–1.18)
CREAT SERPL-MCNC: 0.81 MG/DL (ref 0.73–1.18)
CREAT/UREA NIT SERPL: 29
CREAT/UREA NIT SERPL: 31
EOSINOPHIL # BLD AUTO: 0.1 X10(3)/MCL (ref 0–0.9)
EOSINOPHIL NFR BLD AUTO: 1.1 %
ERYTHROCYTE [DISTWIDTH] IN BLOOD BY AUTOMATED COUNT: 13.4 % (ref 11.5–17)
GFR SERPLBLD CREATININE-BSD FMLA CKD-EPI: >60 ML/MIN/1.73/M2
GFR SERPLBLD CREATININE-BSD FMLA CKD-EPI: >60 ML/MIN/1.73/M2
GLOBULIN SER-MCNC: 3.9 GM/DL (ref 2.4–3.5)
GLUCOSE SERPL-MCNC: 115 MG/DL (ref 82–115)
GLUCOSE SERPL-MCNC: 206 MG/DL (ref 82–115)
HCT VFR BLD AUTO: 26.1 % (ref 42–52)
HGB BLD-MCNC: 8.9 G/DL (ref 14–18)
IMM GRANULOCYTES # BLD AUTO: 0.04 X10(3)/MCL (ref 0–0.04)
IMM GRANULOCYTES NFR BLD AUTO: 0.5 %
LEGIONELLA AG UR QL: NEGATIVE
LYMPHOCYTES # BLD AUTO: 1.54 X10(3)/MCL (ref 0.6–4.6)
LYMPHOCYTES NFR BLD AUTO: 17.6 %
MAGNESIUM SERPL-MCNC: 2.3 MG/DL (ref 1.6–2.6)
MCH RBC QN AUTO: 31.7 PG (ref 27–31)
MCHC RBC AUTO-ENTMCNC: 34.1 G/DL (ref 33–36)
MCV RBC AUTO: 92.9 FL (ref 80–94)
MONOCYTES # BLD AUTO: 1 X10(3)/MCL (ref 0.1–1.3)
MONOCYTES NFR BLD AUTO: 11.4 %
NEUTROPHILS # BLD AUTO: 6.05 X10(3)/MCL (ref 2.1–9.2)
NEUTROPHILS NFR BLD AUTO: 68.9 %
OSMOLALITY SERPL: 265 MOSM/KG (ref 280–300)
PHOSPHATE SERPL-MCNC: 2.1 MG/DL (ref 2.3–4.7)
PLATELET # BLD AUTO: 152 X10(3)/MCL (ref 130–400)
PMV BLD AUTO: 11.8 FL (ref 7.4–10.4)
POTASSIUM SERPL-SCNC: 4.5 MMOL/L (ref 3.5–5.1)
POTASSIUM SERPL-SCNC: 4.6 MMOL/L (ref 3.5–5.1)
PROT SERPL-MCNC: 6.5 GM/DL (ref 5.8–7.6)
RBC # BLD AUTO: 2.81 X10(6)/MCL (ref 4.7–6.1)
SODIUM SERPL-SCNC: 119 MMOL/L (ref 136–145)
SODIUM SERPL-SCNC: 127 MMOL/L (ref 136–145)
WBC # BLD AUTO: 8.77 X10(3)/MCL (ref 4.5–11.5)

## 2024-07-01 PROCEDURE — 85025 COMPLETE CBC W/AUTO DIFF WBC: CPT | Performed by: INTERNAL MEDICINE

## 2024-07-01 PROCEDURE — 94761 N-INVAS EAR/PLS OXIMETRY MLT: CPT

## 2024-07-01 PROCEDURE — 80048 BASIC METABOLIC PNL TOTAL CA: CPT | Performed by: INTERNAL MEDICINE

## 2024-07-01 PROCEDURE — 36415 COLL VENOUS BLD VENIPUNCTURE: CPT | Performed by: INTERNAL MEDICINE

## 2024-07-01 PROCEDURE — 27000221 HC OXYGEN, UP TO 24 HOURS

## 2024-07-01 PROCEDURE — 94640 AIRWAY INHALATION TREATMENT: CPT

## 2024-07-01 PROCEDURE — 25000003 PHARM REV CODE 250: Performed by: INTERNAL MEDICINE

## 2024-07-01 PROCEDURE — 80053 COMPREHEN METABOLIC PANEL: CPT | Performed by: INTERNAL MEDICINE

## 2024-07-01 PROCEDURE — 83930 ASSAY OF BLOOD OSMOLALITY: CPT | Performed by: INTERNAL MEDICINE

## 2024-07-01 PROCEDURE — 11000001 HC ACUTE MED/SURG PRIVATE ROOM

## 2024-07-01 PROCEDURE — 99900035 HC TECH TIME PER 15 MIN (STAT)

## 2024-07-01 PROCEDURE — 25000242 PHARM REV CODE 250 ALT 637 W/ HCPCS: Performed by: INTERNAL MEDICINE

## 2024-07-01 PROCEDURE — 83735 ASSAY OF MAGNESIUM: CPT | Performed by: INTERNAL MEDICINE

## 2024-07-01 PROCEDURE — 84100 ASSAY OF PHOSPHORUS: CPT | Performed by: INTERNAL MEDICINE

## 2024-07-01 PROCEDURE — 21400001 HC TELEMETRY ROOM

## 2024-07-01 PROCEDURE — 63600175 PHARM REV CODE 636 W HCPCS: Performed by: INTERNAL MEDICINE

## 2024-07-01 RX ORDER — FUROSEMIDE 20 MG/1
20 TABLET ORAL ONCE
Status: DISCONTINUED | OUTPATIENT
Start: 2024-07-01 | End: 2024-07-01

## 2024-07-01 RX ORDER — FUROSEMIDE 10 MG/ML
20 INJECTION INTRAMUSCULAR; INTRAVENOUS ONCE
Status: COMPLETED | OUTPATIENT
Start: 2024-07-01 | End: 2024-07-01

## 2024-07-01 RX ADMIN — METOPROLOL SUCCINATE 25 MG: 25 TABLET, EXTENDED RELEASE ORAL at 09:07

## 2024-07-01 RX ADMIN — CEFEPIME 2 G: 2 INJECTION, POWDER, FOR SOLUTION INTRAVENOUS at 12:07

## 2024-07-01 RX ADMIN — CEFEPIME 2 G: 2 INJECTION, POWDER, FOR SOLUTION INTRAVENOUS at 04:07

## 2024-07-01 RX ADMIN — BENZONATATE 200 MG: 100 CAPSULE ORAL at 09:07

## 2024-07-01 RX ADMIN — ENOXAPARIN SODIUM 40 MG: 40 INJECTION SUBCUTANEOUS at 04:07

## 2024-07-01 RX ADMIN — IPRATROPIUM BROMIDE AND ALBUTEROL SULFATE 3 ML: .5; 3 SOLUTION RESPIRATORY (INHALATION) at 09:07

## 2024-07-01 RX ADMIN — AZITHROMYCIN MONOHYDRATE 500 MG: 500 INJECTION, POWDER, LYOPHILIZED, FOR SOLUTION INTRAVENOUS at 02:07

## 2024-07-01 RX ADMIN — DOCUSATE SODIUM 50 MG: 50 LIQUID ORAL at 09:07

## 2024-07-01 RX ADMIN — IPRATROPIUM BROMIDE AND ALBUTEROL SULFATE 3 ML: .5; 3 SOLUTION RESPIRATORY (INHALATION) at 07:07

## 2024-07-01 RX ADMIN — IPRATROPIUM BROMIDE AND ALBUTEROL SULFATE 3 ML: .5; 3 SOLUTION RESPIRATORY (INHALATION) at 01:07

## 2024-07-01 RX ADMIN — MUPIROCIN 1 G: 20 OINTMENT TOPICAL at 09:07

## 2024-07-01 RX ADMIN — ALPRAZOLAM 0.5 MG: 0.5 TABLET ORAL at 09:07

## 2024-07-01 RX ADMIN — FUROSEMIDE 20 MG: 10 INJECTION, SOLUTION INTRAMUSCULAR; INTRAVENOUS at 09:07

## 2024-07-01 RX ADMIN — ATORVASTATIN CALCIUM 10 MG: 10 TABLET, FILM COATED ORAL at 09:07

## 2024-07-01 RX ADMIN — PANTOPRAZOLE SODIUM 40 MG: 40 TABLET, DELAYED RELEASE ORAL at 09:07

## 2024-07-01 RX ADMIN — CEFEPIME 2 G: 2 INJECTION, POWDER, FOR SOLUTION INTRAVENOUS at 09:07

## 2024-07-01 NOTE — PROGRESS NOTES
[facility]                     [address] [address]    PATIENT NAME:       AARON FLYNN  YOB: 1955  CSN:                [id number]   MRN:                [id number]  ADMIT DATE:         06/27/2024 14:16:00  PHYSICIAN:          Navin Coelho MD                            PROGRESS NOTE    DATE:      CODE STATUS:  Full code.    SUBJECTIVE:  The patient was visited in the room.  He wanted to be transferred   from ICU to the floor and he said he is feeling well.  The patient has been   still cautioned that he should always use the call bell whenever he wants to get   out of the bed and he understands that.  The patient when seen in the room, is   sitting in the chair.    OBJECTIVE:  VITAL SIGNS:  As follows; 112/74 blood pressure, 89 pulse,   temperature is 97.5, 96% O2 sat.  He was complaining of chest pain, which was   mechanical, it is only on deep breath or when he coughs.  The patient has been   coughing a lot in the past and I reassured him.  Tylenol was ordered for his   pain and he is satisfied with that.  He does have cough syrup in place.  The   patient was admitted initially for right lower lobe pneumonia with rapid   ventricular response to his atrial fibrillation, which is now controlled.  He   does have a history of cardiomyopathy secondary to viral illness 10 years ago   and ejection fraction with systolic dysfunction.  HEENT:  Head is normocephalic.  Pupils bilaterally reactive to light and equal   in size.  Mild conjunctival pallor.  No scleral icterus.  Trachea is midline.  CHEST:  Has good bilateral air entry.  CVS:  First and second heart sounds are heard normally with soft systolic   ejection murmurs.  ABDOMEN:  Soft and nontender.  EXTREMITIES:  Devoid of any significant edema, cyanosis or clubbing.    LABS AND INVESTIGATIONS:  WBC 9.70, hemoglobin 10.0, hematocrit 29.8, platelet   count  232.  Sodium 124, potassium 4.2, chloride 96, bicarb 19, glucose 128,   calcium 8.7.  BNP 1119.    IMPRESSION:    1. Right lower lobe pneumonia with rapid ventricular response with atrial   fibrillation, now controlled.  2. Nonspecific scotomas yesterday.  Now better.  3. Right lower lobe pneumonia.  Please continue the antibiotics.  4. Cardiomyopathy.  Please continue with the ACE inhibitors and beta blockers.  5. Nonspecific chest pain, mechanical, only on deep breath and coughing.  p.r.n.   Tylenol.  Pleasure taking care of Mr. Asad Kruger during his stay at [facility] on the 3rd floor.        ______________________________  Navin Coelho MD    SS/ABDIRAHMAN  DD:  06/30/2024  Time:  07:33PM  DT:  06/30/2024  Time:  09:47PM  Job #:  [id number]      PROGRESS NOTE

## 2024-07-01 NOTE — PLAN OF CARE
07/01/24 1521   Discharge Assessment   Assessment Type Discharge Planning Assessment   Source of Information patient   People in Home alone   Do you expect to return to your current living situation? Yes   Prior to hospitilization cognitive status: Alert/Oriented;No Deficits   Current cognitive status: Alert/Oriented;No Deficits   Equipment Currently Used at Home none   Do you currently have service(s) that help you manage your care at home? No   Do you take prescription medications? Yes   Do you have prescription coverage? Yes   Do you have any problems affording any of your prescribed medications? No   Is the patient taking medications as prescribed? yes   Are you on dialysis? No   Do you take coumadin? No   Discharge Plan A Home   Discharge Plan B Home   DME Needed Upon Discharge  none   Discharge Plan discussed with: Patient   Transition of Care Barriers None   Physical Activity   On average, how many days per week do you engage in moderate to strenuous exercise (like a brisk walk)? Pt Declined   On average, how many minutes do you engage in exercise at this level? Pt Declined   Financial Resource Strain   How hard is it for you to pay for the very basics like food, housing, medical care, and heating? Pt Declined   Housing Stability   In the last 12 months, was there a time when you were not able to pay the mortgage or rent on time? Pt Declined   At any time in the past 12 months, were you homeless or living in a shelter (including now)? Pt Declined   Transportation Needs   Has the lack of transportation kept you from medical appointments, meetings, work or from getting things needed for daily living? Patient declined   Food Insecurity   Within the past 12 months, you worried that your food would run out before you got the money to buy more. Pt Declined   Within the past 12 months, the food you bought just didn't last and you didn't have money to get more. Pt Declined   Stress   Do you feel stress - tense,  restless, nervous, or anxious, or unable to sleep at night because your mind is troubled all the time - these days? Pt Declined   Social Isolation   How often do you feel lonely or isolated from those around you?  Patient declined   Alcohol Use   Q1: How often do you have a drink containing alcohol? Pt Declined   Q2: How many drinks containing alcohol do you have on a typical day when you are drinking? Pt Declined   Q3: How often do you have six or more drinks on one occasion? Pt Declined   Utilities   In the past 12 months has the electric, gas, oil, or water company threatened to shut off services in your home? Pt Declined   Health Literacy   How often do you need to have someone help you when you read instructions, pamphlets, or other written material from your doctor or pharmacy? Patient declines to respond

## 2024-07-02 LAB
ALBUMIN SERPL-MCNC: 2.7 G/DL (ref 3.4–4.8)
ALBUMIN/GLOB SERPL: 0.7 RATIO (ref 1.1–2)
ALP SERPL-CCNC: 106 UNIT/L (ref 40–150)
ALT SERPL-CCNC: 55 UNIT/L (ref 0–55)
ANION GAP SERPL CALC-SCNC: 9 MEQ/L
AST SERPL-CCNC: 37 UNIT/L (ref 5–34)
BASOPHILS # BLD AUTO: 0.04 X10(3)/MCL
BASOPHILS NFR BLD AUTO: 0.5 %
BILIRUB SERPL-MCNC: 0.7 MG/DL
BUN SERPL-MCNC: 21 MG/DL (ref 8.4–25.7)
CALCIUM SERPL-MCNC: 9.4 MG/DL (ref 8.8–10)
CHLORIDE SERPL-SCNC: 96 MMOL/L (ref 98–107)
CO2 SERPL-SCNC: 21 MMOL/L (ref 23–31)
CREAT SERPL-MCNC: 0.8 MG/DL (ref 0.73–1.18)
CREAT/UREA NIT SERPL: 26
EOSINOPHIL # BLD AUTO: 0.1 X10(3)/MCL (ref 0–0.9)
EOSINOPHIL NFR BLD AUTO: 1.1 %
ERYTHROCYTE [DISTWIDTH] IN BLOOD BY AUTOMATED COUNT: 13.5 % (ref 11.5–17)
GFR SERPLBLD CREATININE-BSD FMLA CKD-EPI: >60 ML/MIN/1.73/M2
GLOBULIN SER-MCNC: 3.9 GM/DL (ref 2.4–3.5)
GLUCOSE SERPL-MCNC: 160 MG/DL (ref 82–115)
HCT VFR BLD AUTO: 30.1 % (ref 42–52)
HGB BLD-MCNC: 10.1 G/DL (ref 14–18)
IMM GRANULOCYTES # BLD AUTO: 0.07 X10(3)/MCL (ref 0–0.04)
IMM GRANULOCYTES NFR BLD AUTO: 0.8 %
LYMPHOCYTES # BLD AUTO: 1.69 X10(3)/MCL (ref 0.6–4.6)
LYMPHOCYTES NFR BLD AUTO: 19.3 %
MAGNESIUM SERPL-MCNC: 2.4 MG/DL (ref 1.6–2.6)
MCH RBC QN AUTO: 31.5 PG (ref 27–31)
MCHC RBC AUTO-ENTMCNC: 33.6 G/DL (ref 33–36)
MCV RBC AUTO: 93.8 FL (ref 80–94)
MONOCYTES # BLD AUTO: 0.81 X10(3)/MCL (ref 0.1–1.3)
MONOCYTES NFR BLD AUTO: 9.3 %
NEUTROPHILS # BLD AUTO: 6.04 X10(3)/MCL (ref 2.1–9.2)
NEUTROPHILS NFR BLD AUTO: 69 %
OHS QRS DURATION: 100 MS
OHS QTC CALCULATION: 431 MS
PHOSPHATE SERPL-MCNC: 2.6 MG/DL (ref 2.3–4.7)
PLATELET # BLD AUTO: 306 X10(3)/MCL (ref 130–400)
PMV BLD AUTO: 10 FL (ref 7.4–10.4)
POTASSIUM SERPL-SCNC: 4.4 MMOL/L (ref 3.5–5.1)
PROT SERPL-MCNC: 6.6 GM/DL (ref 5.8–7.6)
RBC # BLD AUTO: 3.21 X10(6)/MCL (ref 4.7–6.1)
SODIUM SERPL-SCNC: 126 MMOL/L (ref 136–145)
WBC # BLD AUTO: 8.75 X10(3)/MCL (ref 4.5–11.5)

## 2024-07-02 PROCEDURE — 93010 ELECTROCARDIOGRAM REPORT: CPT | Mod: ,,, | Performed by: INTERNAL MEDICINE

## 2024-07-02 PROCEDURE — 63600175 PHARM REV CODE 636 W HCPCS: Performed by: INTERNAL MEDICINE

## 2024-07-02 PROCEDURE — 83735 ASSAY OF MAGNESIUM: CPT | Performed by: INTERNAL MEDICINE

## 2024-07-02 PROCEDURE — 84100 ASSAY OF PHOSPHORUS: CPT | Performed by: INTERNAL MEDICINE

## 2024-07-02 PROCEDURE — 21400001 HC TELEMETRY ROOM

## 2024-07-02 PROCEDURE — 27000221 HC OXYGEN, UP TO 24 HOURS

## 2024-07-02 PROCEDURE — 85025 COMPLETE CBC W/AUTO DIFF WBC: CPT | Performed by: INTERNAL MEDICINE

## 2024-07-02 PROCEDURE — 99900035 HC TECH TIME PER 15 MIN (STAT)

## 2024-07-02 PROCEDURE — 94761 N-INVAS EAR/PLS OXIMETRY MLT: CPT

## 2024-07-02 PROCEDURE — 93005 ELECTROCARDIOGRAM TRACING: CPT

## 2024-07-02 PROCEDURE — 25000242 PHARM REV CODE 250 ALT 637 W/ HCPCS: Performed by: INTERNAL MEDICINE

## 2024-07-02 PROCEDURE — 11000001 HC ACUTE MED/SURG PRIVATE ROOM

## 2024-07-02 PROCEDURE — 94640 AIRWAY INHALATION TREATMENT: CPT

## 2024-07-02 PROCEDURE — 36415 COLL VENOUS BLD VENIPUNCTURE: CPT | Performed by: INTERNAL MEDICINE

## 2024-07-02 PROCEDURE — 80053 COMPREHEN METABOLIC PANEL: CPT | Performed by: INTERNAL MEDICINE

## 2024-07-02 PROCEDURE — 25000003 PHARM REV CODE 250: Performed by: INTERNAL MEDICINE

## 2024-07-02 PROCEDURE — 97162 PT EVAL MOD COMPLEX 30 MIN: CPT

## 2024-07-02 RX ORDER — HYDROCODONE BITARTRATE AND ACETAMINOPHEN 7.5; 325 MG/15ML; MG/15ML
15 SOLUTION ORAL EVERY 8 HOURS PRN
Status: DISCONTINUED | OUTPATIENT
Start: 2024-07-02 | End: 2024-07-03 | Stop reason: HOSPADM

## 2024-07-02 RX ORDER — PROMETHAZINE HYDROCHLORIDE 6.25 MG/5ML
12.5 SYRUP ORAL EVERY 6 HOURS PRN
Status: DISCONTINUED | OUTPATIENT
Start: 2024-07-02 | End: 2024-07-03 | Stop reason: HOSPADM

## 2024-07-02 RX ORDER — FUROSEMIDE 10 MG/ML
20 INJECTION INTRAMUSCULAR; INTRAVENOUS ONCE
Status: COMPLETED | OUTPATIENT
Start: 2024-07-02 | End: 2024-07-02

## 2024-07-02 RX ADMIN — IPRATROPIUM BROMIDE AND ALBUTEROL SULFATE 3 ML: .5; 3 SOLUTION RESPIRATORY (INHALATION) at 07:07

## 2024-07-02 RX ADMIN — ACETYLCYSTEINE 2 ML: 200 INHALANT RESPIRATORY (INHALATION) at 07:07

## 2024-07-02 RX ADMIN — LISINOPRIL 2.5 MG: 2.5 TABLET ORAL at 06:07

## 2024-07-02 RX ADMIN — METOPROLOL SUCCINATE 25 MG: 25 TABLET, EXTENDED RELEASE ORAL at 09:07

## 2024-07-02 RX ADMIN — MUPIROCIN 1 G: 20 OINTMENT TOPICAL at 09:07

## 2024-07-02 RX ADMIN — DOCUSATE SODIUM 50 MG: 50 LIQUID ORAL at 09:07

## 2024-07-02 RX ADMIN — PROMETHAZINE HYDROCHLORIDE 12.5 MG: 6.25 SOLUTION ORAL at 10:07

## 2024-07-02 RX ADMIN — PANTOPRAZOLE SODIUM 40 MG: 40 TABLET, DELAYED RELEASE ORAL at 09:07

## 2024-07-02 RX ADMIN — FUROSEMIDE 20 MG: 10 INJECTION, SOLUTION INTRAMUSCULAR; INTRAVENOUS at 03:07

## 2024-07-02 RX ADMIN — CEFEPIME 2 G: 2 INJECTION, POWDER, FOR SOLUTION INTRAVENOUS at 09:07

## 2024-07-02 RX ADMIN — AZITHROMYCIN MONOHYDRATE 500 MG: 500 INJECTION, POWDER, LYOPHILIZED, FOR SOLUTION INTRAVENOUS at 12:07

## 2024-07-02 RX ADMIN — CEFEPIME 2 G: 2 INJECTION, POWDER, FOR SOLUTION INTRAVENOUS at 12:07

## 2024-07-02 RX ADMIN — CEFEPIME 2 G: 2 INJECTION, POWDER, FOR SOLUTION INTRAVENOUS at 03:07

## 2024-07-02 RX ADMIN — ATORVASTATIN CALCIUM 10 MG: 10 TABLET, FILM COATED ORAL at 09:07

## 2024-07-02 RX ADMIN — ENOXAPARIN SODIUM 40 MG: 40 INJECTION SUBCUTANEOUS at 05:07

## 2024-07-02 NOTE — PT/OT/SLP EVAL
Physical Therapy Evaluation    Patient Name:  Slick Kamara   MRN:  [id number]    Recommendations:     Discharge Recommendations: Low Intensity Therapy (Possibly outpatient cardiac rehab, pending MD? Patient is very weak and becomes short of breath easily)   Discharge Equipment Recommendations: other (see comments) (possibly RW depending on progress)   Barriers to discharge: None    Assessment:     Slick Kamara is a 69 y.o. male admitted with a medical diagnosis of SVT (supraventricular tachycardia).  He presents with the following impairments/functional limitations: impaired endurance, weakness, impaired balance, gait instability .    Physical Therapy evaluation completed. Patient did okay overall. Patient ambulated 50ft on 2L with HR monitored, maintained  bpm with no SOB, had seated rest, ambulated again for ~120ft with RW and 2L and HR increased to 105 bpm with mild shortness of breath. Patient did have one LOB necessitating CGA and use of RW for stability during gait. Patient exhibits significant endurance deficits but is improved from admit per patient. Ended session supine in bed, HOB elevated, bed alarm on, needs in reach.    Rehab Prognosis: Good; patient would benefit from acute skilled PT services to address these deficits and reach maximum level of function.    Recent Surgery: * No surgery found *      Plan:     During this hospitalization, patient to be seen 5 x/week to address the identified rehab impairments via gait training, therapeutic activities, therapeutic exercises and progress toward the following goals:    Plan of Care Expires:       Subjective     Chief Complaint: SOB  Patient/Family Comments/goals: to go home upon discharge  Pain/Comfort:  Pain Rating 1: 0/10    Patients cultural, spiritual, Confucianist conflicts given the current situation:      Living Environment:  Patient states he lives in a home with no steps to enter, was independent with ADLs prior to admit.   Equipment  used at home: none.  DME owned (not currently used): none.  Upon discharge, patient will have assistance from no one.    Objective:     Communicated with staff prior to session.  Patient found HOB elevated with telemetry, bed alarm  upon PT entry to room.    General Precautions: Standard, fall  Orthopedic Precautions:N/A   Braces: N/A  Respiratory Status: Nasal cannula, flow 1 L/min    Exams:  RLE ROM: WFL  RLE Strength: WFL  LLE ROM: WFL  LLE Strength: WFL    Functional Mobility:  Bed Mobility:     Supine to Sit: supervision  Sit to Supine: supervision  Transfers:     Sit to Stand:  contact guard assistance with no AD  Gait: Patient ambulated 50ft on 2L with HR monitored, maintained  bpm with no SOB, had seated rest, ambulated again for ~120ft with RW and 2L and HR increased to 105 bpm with mild shortness of breath. Patient did have one LOB necessitating CGA and use of RW for stability during gait.       AM-PAC 6 CLICK MOBILITY  Total Score:        Treatment & Education:  Please see assessment for full details. Patient was also educated on his POC and use of call light.    Patient left HOB elevated with all lines intact, call button in reach, and bed alarm on.    GOALS:   Multidisciplinary Problems       Physical Therapy Goals          Problem: Physical Therapy    Goal Priority Disciplines Outcome Goal Variances Interventions   Physical Therapy Goal     PT, PT/OT Progressing     Description: Goals to be met by: discharge     Patient will increase functional independence with mobility by performin. Sit to stand transfer with [place]  2. Gait  x 200 feet with [place] using No Assistive Device.                          History:     Past Medical History:   Diagnosis Date    CHF (congestive heart failure)     GERD (gastroesophageal reflux disease)     High cholesterol     HTN (hypertension)        Past Surgical History:   Procedure Laterality Date    COLONOSCOPY  2016    COLONOSCOPY N/A  07/28/2022    Procedure: COLONOSCOPY;  Surgeon: Vern Mitchell III, MD;  Location: [facility];  Service: Endoscopy;  Laterality: N/A;  Suboptimal prep. Diverticulosis without perforation.    FACIAL FRACTURE SURGERY      HERNIA REPAIR      HIP REPLACEMENT ARTHROPLASTY Left     KNEE ARTHROSCOPY Right     WRIST SURGERY Right        Time Tracking:     PT Received On: 07/02/24  PT Start Time: 1500     PT Stop Time: 1518  PT Total Time (min): 18 min     Billable Minutes: Evaluation 18      07/02/2024

## 2024-07-02 NOTE — PLAN OF CARE
Problem: Pneumonia  Goal: Fluid Balance  7/2/2024 0254 by Gabbie Petit LPN  Outcome: Progressing  7/2/2024 0254 by Gabbie Petit LPN  Outcome: Progressing  Goal: Resolution of Infection Signs and Symptoms  7/2/2024 0254 by Gabbie Petit LPN  Outcome: Progressing  7/2/2024 0254 by Gabbie Petit LPN  Outcome: Progressing     Problem: Comorbidity Management  Goal: Maintenance of Heart Failure Symptom Control  7/2/2024 0254 by aGbbie Petit LPN  Outcome: Progressing  7/2/2024 0254 by Gabbie Petit LPN  Outcome: Progressing     Problem: Adult Inpatient Plan of Care  Goal: Plan of Care Review  7/2/2024 0254 by Gabbie Petit LPN  Outcome: Progressing  7/2/2024 0254 by Gabbie Petit LPN  Outcome: Progressing  Goal: Patient-Specific Goal (Individualized)  7/2/2024 0254 by Gabbie Petit LPN  Outcome: Progressing  7/2/2024 0254 by Gabbie Petit LPN  Outcome: Progressing  Goal: Absence of Hospital-Acquired Illness or Injury  7/2/2024 0254 by Gabbie Petit LPN  Outcome: Progressing  7/2/2024 0254 by Gabbie Petit LPN  Outcome: Progressing  Goal: Optimal Comfort and Wellbeing  7/2/2024 0254 by Gabbie Petit LPN  Outcome: Progressing  7/2/2024 0254 by Gabbie Petit LPN  Outcome: Progressing  Goal: Readiness for Transition of Care  7/2/2024 0254 by Gabbie Petit LPN  Outcome: Progressing  7/2/2024 0254 by Gabbie Petit LPN  Outcome: Progressing     Problem: Fatigue  Goal: Improved Activity Tolerance  7/2/2024 0254 by Gabbie Petit LPN  Outcome: Progressing  7/2/2024 0254 by Gabbie Petit LPN  Outcome: Progressing

## 2024-07-02 NOTE — PROGRESS NOTES
[facility]  [address] [address]  Phone: [phone]    (Hospital) Internal Medicine  Progress Note      PATIENT NAME: Slick Kamara  MRN: [id number]  TODAY'S DATE: 07/01/2024  ADMIT DATE: 6/27/2024    SUBJECTIVE     PRINCIPLE PROBLEM: SVT (supraventricular tachycardia)    INTERVAL HISTORY:    7/1/2024  Patient over the weekend showed signs of decreasing mental status in the sodium of 119 this morning.  Patient was given 20 mg of Lasix IV because of the shortness of breaths.  His sodium on repeat was 127 this evening.  He feels better and he is more alert.  This was corroborated upon next the family whom I discussed his case with later this evening by phone.  Patient was still short of breath    Review of patient's allergies indicates:  No Known Allergies    ROS lethargy, shortness for breath, confusion, fatigue, bilateral lower extremity swelling    OBJECTIVE     VITAL SIGNS (Most Recent)  Temp: 98.3 °F (36.8 °C) (07/01/24 1545)  Pulse: 98 (07/01/24 2102)  Resp: 18 (07/01/24 2102)  BP: 102/71 (07/01/24 1545)  SpO2: 97 % (07/01/24 2102)    VENTILATION STATUS  Resp: 18 (07/01/24 2102)  SpO2: 97 % (07/01/24 2102)           I & O (Last 24H):  Intake/Output Summary (Last 24 hours) at 7/1/2024 2235  Last data filed at 7/1/2024 2026  Gross per 24 hour   Intake 480 ml   Output 2500 ml   Net -2020 ml       WEIGHTS  Wt Readings from Last 3 Encounters:   06/27/24 1428 91.6 kg (202 lb)   11/15/23 0401 96.6 kg (213 lb)   09/08/22 0124 88.6 kg (195 lb 6.4 oz)       Physical Exam    Patient was alert and oriented times 2 this morning he was little confused about few details.  This evening he was much more oriented x3.  Nurse Chyna was at the bedside this evening.  Cranial nerves 2-12 are intact.  No JVD.  Regular rate and rhythm no rubs gallops or murmurs decreased breath sounds bilateral bases.  Abdomen is soft nontender obese mildly distended but no tenderness to palpation no  clubbing cyanosis.  +1 edema bilateral lower extremities.    SCHEDULED MEDS:   atorvastatin  10 mg Oral QHS    azithromycin  500 mg Intravenous Q24H    ceFEPime IV (PEDS and ADULTS)  2 g Intravenous Q8H    docusate  50 mg Oral Daily    enoxparin  40 mg Subcutaneous Daily    lisinopriL  2.5 mg Oral QAM    metoprolol succinate  25 mg Oral Daily    mupirocin   Nasal BID    pantoprazole  40 mg Oral Daily       CONTINUOUS INFUSIONS:    PRN MEDS:  Current Facility-Administered Medications:     acetaminophen, 650 mg, Oral, Q6H PRN    acetylcysteine 200 mg/ml (20%), 2 mL, Nebulization, Q6H PRN    albuterol-ipratropium, 3 mL, Nebulization, Q6H PRN    ALPRAZolam, 0.5 mg, Oral, TID PRN    benzonatate, 200 mg, Oral, TID PRN    bisacodyL, 10 mg, Rectal, Daily PRN    hydrocodone-apap 7.5-325 MG/15 ML, 15 mL, Oral, Q6H PRN    lorazepam, 2 mg, Intravenous, Q4H PRN    melatonin, 6 mg, Oral, Nightly PRN    morphine, 1 mg, Intravenous, Q4H PRN    ondansetron, 4 mg, Intravenous, Q8H PRN    sodium chloride 0.9%, 10 mL, Intravenous, PRN    sodium chloride 0.9%, 10 mL, Intravenous, Q12H PRN    LABS AND DIAGNOSTICS     CBC LAST 3 DAYS  Recent Labs   Lab 06/29/24  0342 06/30/24  0423 07/01/24  0328   WBC 10.56 9.70 8.77   RBC 3.10* 3.19* 2.81*   HGB 9.9* 10.0* 8.9*   HCT 29.3* 29.8* 26.1*   MCV 94.5* 93.4 92.9   MCH 31.9* 31.3* 31.7*   MCHC 33.8 33.6 34.1   RDW 13.9 13.7 13.4    232 152   MPV 10.6* 10.7* 11.8*       COAGULATION LAST 3 DAYS  Recent Labs   Lab 06/27/24  1429   INR 1.2   APTT 33.1       CHEMISTRY LAST 3 DAYS  Recent Labs   Lab 06/28/24  0404 06/29/24  0342 06/30/24  0124 06/30/24  0424 07/01/24  0328 07/01/24  1714   * 127*   < > 124* 119* 127*   K 4.1 4.0   < > 4.2 4.5 4.6    99   < > 96* 92* 96*   CO2 20* 20*   < > 19* 18* 22*   BUN 24.0 22.0   < > 25.0 25.0 22.0   CREATININE 0.94 0.86   < > 0.79 0.81 0.77   CALCIUM 9.0 8.5*   < > 8.7* 8.5* 9.2   MG 2.30 2.40  --  2.40 2.30  --    ALBUMIN 3.1* 2.9*  --    "--   --  2.6*   ALKPHOS 88 82  --   --   --  111   ALT 20 27  --   --   --  59*   AST 20 26  --   --   --  43*   BILITOT 0.7 1.1  --   --   --  0.7    < > = values in this interval not displayed.       CARDIAC PROFILE LAST 3 DAYS  Recent Labs   Lab 06/27/24  1429 06/30/24  0124 06/30/24  0423   BNP 1,038.6*  --  1,119.3*   CPK  --  61  --    CPKMB  --  1.2  --    TROPONINI 0.017 0.022  --        ENDOCRINE LAST 3 DAYS  Recent Labs   Lab 06/27/24  1429   TSH 1.231       LAST ARTERIAL BLOOD GAS  ABG  No results for input(s): "PH", "PO2", "PCO2", "HCO3", "BE" in the last 168 hours.    LAST 7 DAYS MICROBIOLOGY   Microbiology Results (last 7 days)       Procedure Component Value Units Date/Time    Blood culture #1 **CANNOT BE ORDERED STAT** [[id number]]  (Normal) Collected: 06/27/24 1608    Order Status: Completed Specimen: Blood Updated: 07/01/24 0802     Blood Culture No Growth At 72 Hours    Blood culture #2 **CANNOT BE ORDERED STAT** [[id number]]  (Normal) Collected: 06/27/24 1615    Order Status: Completed Specimen: Blood Updated: 07/01/24 0802     Blood Culture No Growth At 72 Hours            MOST RECENT IMAGING  X-Ray Chest 1 View  Narrative: EXAMINATION:  XR CHEST 1 VIEW    CLINICAL HISTORY:  Sob.;, .    COMPARISON:  06/27/2024    FINDINGS:  An AP view or more reveals heart to be enlarged.  The trachea is midline.  There is patchy hazy opacity of the right lung base and left mid and lower lung.  Degenerative changes and curvature are noted to the thoracic spine.  There is elevation of the right hemidiaphragm.  Impression: 1. Mild cardiomegaly  2. Patchy infiltrate and or atelectasis left mid and lower lung and right lung base  3. Elevated right hemidiaphragm    Electronically signed by: Isidoro Yates  Date:    07/01/2024  Time:    11:15      [facility]  Results for orders placed during the hospital encounter of 06/27/24    Echo    Interpretation Summary    Left Ventricle: The left ventricle is moderately dilated. " Increased wall thickness. Severe global hypokinesis present. There is severely reduced systolic function with a visually estimated ejection fraction of 10 -15%.    Right Ventricle: Mild right ventricular enlargement.    Left Atrium: Left atrium is moderately dilated.    Right Atrium: Right atrium is mildly dilated.    Aortic Valve: The aortic valve is a trileaflet valve. Mildly calcified cusps.    Mitral Valve: There is mild regurgitation.    Pulmonic Valve: There is moderate regurgitation.      CURRENT/PREVIOUS VISIT EKG  Results for orders placed or performed during the hospital encounter of 06/27/24   EKG 12-lead    Collection Time: 06/30/24  1:24 AM   Result Value Ref Range    QRS Duration 104 ms    OHS QTC Calculation 449 ms    Narrative    Test Reason : R07.9,    Vent. Rate : 095 BPM     Atrial Rate : 095 BPM     P-R Int : 252 ms          QRS Dur : 104 ms      QT Int : 358 ms       P-R-T Axes : 034 -36 066 degrees     QTc Int : 449 ms    Sinus rhythm with 1st degree A-V block  Left axis deviation  Anterior infarct ,age undetermined  Abnormal ECG  When compared with ECG of 27-JUN-2024 14:19,  MS interval has increased  Vent. rate has decreased BY  52 BPM  Nonspecific T wave abnormality now evident in Lateral leads  Confirmed by Eduardo Quesada MD ([id number]) on 6/30/2024 2:36:01 PM    Referred By: [email or web address]   SELF           Confirmed By:Eduardo Quesada MD       ASSESSMENT/PLAN:     Active Hospital Problems    Diagnosis    *SVT (supraventricular tachycardia)    Shortness of breath    Tachycardia    Community acquired pneumonia    Acute on chronic systolic congestive heart failure, NYHA class 3       ASSESSMENT & PLAN:   SVT continue to monitor.  Close volume status observation.  Next para continue breathing treatments supplemental oxygen breathing treatments and and antibiotics for pneumonia.    Will discuss with his cardiologist with regards to his EF after we are through this pneumonia process.  Considerations for  ICD.      RECOMMENDATIONS:  DVT prophylaxis enoxaparin, GI prophylaxis pantoprazole        Vern Mitchell III, MD  Department of Hospital Medicine ([facility])   Date of Service: 07/01/2024  10:35 PM

## 2024-07-03 VITALS
TEMPERATURE: 98 F | OXYGEN SATURATION: 96 % | DIASTOLIC BLOOD PRESSURE: 80 MMHG | WEIGHT: 217.38 LBS | RESPIRATION RATE: 18 BRPM | HEART RATE: 93 BPM | BODY MASS INDEX: 32.2 KG/M2 | HEIGHT: 69 IN | SYSTOLIC BLOOD PRESSURE: 125 MMHG

## 2024-07-03 LAB
ALBUMIN SERPL-MCNC: 2.4 G/DL (ref 3.4–4.8)
ALBUMIN/GLOB SERPL: 0.7 RATIO (ref 1.1–2)
ALP SERPL-CCNC: 101 UNIT/L (ref 40–150)
ALT SERPL-CCNC: 53 UNIT/L (ref 0–55)
ANION GAP SERPL CALC-SCNC: 6 MEQ/L
AST SERPL-CCNC: 34 UNIT/L (ref 5–34)
BACTERIA BLD CULT: NORMAL
BACTERIA BLD CULT: NORMAL
BASOPHILS # BLD AUTO: 0.05 X10(3)/MCL
BASOPHILS NFR BLD AUTO: 0.7 %
BILIRUB SERPL-MCNC: 0.6 MG/DL
BNP BLD-MCNC: 1632.7 PG/ML
BUN SERPL-MCNC: 19 MG/DL (ref 8.4–25.7)
CALCIUM SERPL-MCNC: 8.9 MG/DL (ref 8.8–10)
CHLORIDE SERPL-SCNC: 100 MMOL/L (ref 98–107)
CO2 SERPL-SCNC: 25 MMOL/L (ref 23–31)
CREAT SERPL-MCNC: 0.7 MG/DL (ref 0.73–1.18)
CREAT/UREA NIT SERPL: 27
EOSINOPHIL # BLD AUTO: 0.16 X10(3)/MCL (ref 0–0.9)
EOSINOPHIL NFR BLD AUTO: 2.1 %
ERYTHROCYTE [DISTWIDTH] IN BLOOD BY AUTOMATED COUNT: 13.8 % (ref 11.5–17)
GFR SERPLBLD CREATININE-BSD FMLA CKD-EPI: >60 ML/MIN/1.73/M2
GLOBULIN SER-MCNC: 3.4 GM/DL (ref 2.4–3.5)
GLUCOSE SERPL-MCNC: 111 MG/DL (ref 82–115)
HCT VFR BLD AUTO: 27.4 % (ref 42–52)
HGB BLD-MCNC: 9.2 G/DL (ref 14–18)
IMM GRANULOCYTES # BLD AUTO: 0.05 X10(3)/MCL (ref 0–0.04)
IMM GRANULOCYTES NFR BLD AUTO: 0.7 %
LYMPHOCYTES # BLD AUTO: 1.52 X10(3)/MCL (ref 0.6–4.6)
LYMPHOCYTES NFR BLD AUTO: 20.1 %
MAGNESIUM SERPL-MCNC: 2.4 MG/DL (ref 1.6–2.6)
MCH RBC QN AUTO: 31.5 PG (ref 27–31)
MCHC RBC AUTO-ENTMCNC: 33.6 G/DL (ref 33–36)
MCV RBC AUTO: 93.8 FL (ref 80–94)
MONOCYTES # BLD AUTO: 0.82 X10(3)/MCL (ref 0.1–1.3)
MONOCYTES NFR BLD AUTO: 10.9 %
NEUTROPHILS # BLD AUTO: 4.95 X10(3)/MCL (ref 2.1–9.2)
NEUTROPHILS NFR BLD AUTO: 65.5 %
PHOSPHATE SERPL-MCNC: 2.8 MG/DL (ref 2.3–4.7)
PLATELET # BLD AUTO: 329 X10(3)/MCL (ref 130–400)
PMV BLD AUTO: 9.6 FL (ref 7.4–10.4)
POTASSIUM SERPL-SCNC: 3.9 MMOL/L (ref 3.5–5.1)
PROT SERPL-MCNC: 5.8 GM/DL (ref 5.8–7.6)
RBC # BLD AUTO: 2.92 X10(6)/MCL (ref 4.7–6.1)
SODIUM SERPL-SCNC: 131 MMOL/L (ref 136–145)
TROPONIN I SERPL-MCNC: 0.01 NG/ML (ref 0–0.04)
WBC # BLD AUTO: 7.55 X10(3)/MCL (ref 4.5–11.5)

## 2024-07-03 PROCEDURE — 25000003 PHARM REV CODE 250: Performed by: INTERNAL MEDICINE

## 2024-07-03 PROCEDURE — 36415 COLL VENOUS BLD VENIPUNCTURE: CPT | Performed by: INTERNAL MEDICINE

## 2024-07-03 PROCEDURE — 83735 ASSAY OF MAGNESIUM: CPT | Performed by: INTERNAL MEDICINE

## 2024-07-03 PROCEDURE — 97530 THERAPEUTIC ACTIVITIES: CPT

## 2024-07-03 PROCEDURE — 84484 ASSAY OF TROPONIN QUANT: CPT | Performed by: INTERNAL MEDICINE

## 2024-07-03 PROCEDURE — 99900035 HC TECH TIME PER 15 MIN (STAT)

## 2024-07-03 PROCEDURE — 25000242 PHARM REV CODE 250 ALT 637 W/ HCPCS: Performed by: INTERNAL MEDICINE

## 2024-07-03 PROCEDURE — 84100 ASSAY OF PHOSPHORUS: CPT | Performed by: INTERNAL MEDICINE

## 2024-07-03 PROCEDURE — 93005 ELECTROCARDIOGRAM TRACING: CPT

## 2024-07-03 PROCEDURE — 93010 ELECTROCARDIOGRAM REPORT: CPT | Mod: ,,, | Performed by: INTERNAL MEDICINE

## 2024-07-03 PROCEDURE — 94761 N-INVAS EAR/PLS OXIMETRY MLT: CPT

## 2024-07-03 PROCEDURE — 80053 COMPREHEN METABOLIC PANEL: CPT | Performed by: INTERNAL MEDICINE

## 2024-07-03 PROCEDURE — 94640 AIRWAY INHALATION TREATMENT: CPT

## 2024-07-03 PROCEDURE — 83880 ASSAY OF NATRIURETIC PEPTIDE: CPT | Performed by: INTERNAL MEDICINE

## 2024-07-03 PROCEDURE — 85025 COMPLETE CBC W/AUTO DIFF WBC: CPT | Performed by: INTERNAL MEDICINE

## 2024-07-03 PROCEDURE — 63600175 PHARM REV CODE 636 W HCPCS: Performed by: INTERNAL MEDICINE

## 2024-07-03 PROCEDURE — 97116 GAIT TRAINING THERAPY: CPT

## 2024-07-03 RX ORDER — FUROSEMIDE 20 MG/1
20 TABLET ORAL DAILY
Status: DISCONTINUED | OUTPATIENT
Start: 2024-07-03 | End: 2024-07-03 | Stop reason: HOSPADM

## 2024-07-03 RX ORDER — METOPROLOL TARTRATE 1 MG/ML
5 INJECTION, SOLUTION INTRAVENOUS EVERY 5 MIN PRN
Status: DISCONTINUED | OUTPATIENT
Start: 2024-07-03 | End: 2024-07-03 | Stop reason: HOSPADM

## 2024-07-03 RX ORDER — POTASSIUM CHLORIDE 750 MG/1
10 TABLET, EXTENDED RELEASE ORAL DAILY
Status: DISCONTINUED | OUTPATIENT
Start: 2024-07-03 | End: 2024-07-03 | Stop reason: HOSPADM

## 2024-07-03 RX ADMIN — PROMETHAZINE HYDROCHLORIDE 12.5 MG: 6.25 SOLUTION ORAL at 06:07

## 2024-07-03 RX ADMIN — AZITHROMYCIN MONOHYDRATE 500 MG: 500 INJECTION, POWDER, LYOPHILIZED, FOR SOLUTION INTRAVENOUS at 12:07

## 2024-07-03 RX ADMIN — LISINOPRIL 2.5 MG: 2.5 TABLET ORAL at 06:07

## 2024-07-03 RX ADMIN — DOCUSATE SODIUM 50 MG: 50 LIQUID ORAL at 08:07

## 2024-07-03 RX ADMIN — IPRATROPIUM BROMIDE AND ALBUTEROL SULFATE 3 ML: .5; 3 SOLUTION RESPIRATORY (INHALATION) at 07:07

## 2024-07-03 RX ADMIN — ENOXAPARIN SODIUM 40 MG: 40 INJECTION SUBCUTANEOUS at 06:07

## 2024-07-03 RX ADMIN — POTASSIUM CHLORIDE 10 MEQ: 750 TABLET, FILM COATED, EXTENDED RELEASE ORAL at 08:07

## 2024-07-03 RX ADMIN — ACETYLCYSTEINE 2 ML: 200 INHALANT RESPIRATORY (INHALATION) at 07:07

## 2024-07-03 RX ADMIN — CEFEPIME 2 G: 2 INJECTION, POWDER, FOR SOLUTION INTRAVENOUS at 03:07

## 2024-07-03 RX ADMIN — FUROSEMIDE 20 MG: 20 TABLET ORAL at 08:07

## 2024-07-03 RX ADMIN — PANTOPRAZOLE SODIUM 40 MG: 40 TABLET, DELAYED RELEASE ORAL at 08:07

## 2024-07-03 RX ADMIN — METOPROLOL SUCCINATE 25 MG: 25 TABLET, EXTENDED RELEASE ORAL at 08:07

## 2024-07-03 NOTE — PT/OT/SLP PROGRESS
Physical Therapy Treatment    Patient Name:  Slick Kamara   MRN:  [id number]    Recommendations:     Discharge Recommendations: Low Intensity Therapy (Possibly outpatient cardiac rehab, pending MD? Patient is very weak and becomes short of breath easily)  Discharge Equipment Recommendations: other (see comments) (possibly RW depending on progress)  Barriers to discharge:  current medical status    Assessment:     Slick Kamara is a 69 y.o. male admitted with a medical diagnosis of Community acquired pneumonia.  He presents with the following impairments/functional limitations: impaired endurance, weakness, impaired balance, gait instability     Pt found with HOB elevated. He is agreeable to PT tx. Supine to sit completed, CGA/SBA. Sit to stand completed CGA with RW. Gait training completed x250 feet in hallway with RW, CGA for safety. Pulse ox monitored throughout treatment. O2 sats remained 96-99% on room air. Patient returned to room and peformed sit to supine, SBA. Pt left with HOB elevated and all needs met. .    Rehab Prognosis: Good; patient would benefit from acute skilled PT services to address these deficits and reach maximum level of function.    Recent Surgery: * No surgery found *      Plan:     During this hospitalization, patient to be seen 5 x/week to address the identified rehab impairments via gait training, therapeutic activities, therapeutic exercises and progress toward the following goals:    Plan of Care Expires:       Subjective     Chief Complaint: coughing frequently  Patient/Family Comments/goals: to go home  Pain/Comfort:         Objective:     Communicated with nursing prior to session.  Patient found HOB elevated with   upon PT entry to room.     General Precautions: Standard, fall  Orthopedic Precautions: N/A  Braces: N/A  Respiratory Status: Room air     Functional Mobility:  Bed Mobility:     Supine to Sit: stand by assistance and contact guard assistance  Sit to Supine: stand by  assistance  Transfers:     Sit to Stand:  contact guard assistance with rolling walker  Gait: 250 feet with RW, CGA for safety      AM-PAC 6 CLICK MOBILITY          Treatment & Education:  See above    Patient left HOB elevated with all lines intact, call button in reach, bed alarm on, and nurse notified..    GOALS:   Multidisciplinary Problems       Physical Therapy Goals          Problem: Physical Therapy    Goal Priority Disciplines Outcome Goal Variances Interventions   Physical Therapy Goal     PT, PT/OT Progressing     Description: Goals to be met by: discharge     Patient will increase functional independence with mobility by performin. Sit to stand transfer with [place]  2. Gait  x 200 feet with [place] using No Assistive Device.                          Time Tracking:     PT Received On: 24  PT Start Time: 906     PT Stop Time: 937  PT Total Time (min): 31 min     Billable Minutes: Gait Training 16 and Therapeutic Activity 15    Treatment Type: Treatment  PT/PTA: PT           2024

## 2024-07-03 NOTE — PROGRESS NOTES
"Inpatient Nutrition Evaluation    Admit Date: 2024   Total duration of encounter: 6 days    Nutrition Recommendation/Prescription     Continue Heart Healthy Diet with 1000 mL fluid restriction per MD orders.   Daily weight and labs.   Monitor intake, weight, and labs.    RD following and available as needed. Thank you.     Nutrition Assessment     Chart Review    Reason Seen: length of stay    Malnutrition Screening Tool Results   Have you recently lost weight without trying?: No  Have you been eating poorly because of a decreased appetite?: No   MST Score: 0     Diagnosis:  Community acquired pneumonia.     Relevant Medical History:   CHF (congestive heart failure)      GERD (gastroesophageal reflux disease)      High cholesterol      HTN (hypertension)          Nutrition-Related Medications:   Lovenox; Lasix.     Nutrition-Related Labs:  7/3: H/H 9.2/27.4(L); Na+ 131(L); Crea 0.70(L); Alb 2.4(L).    Diet Order: Diet Heart Healthy Fluid - 1000mL  Oral Supplement Order: none  Appetite/Oral Intake: good/% of meals  Factors Affecting Nutritional Intake: none identified  Food/Islam/Cultural Preferences: none reported  Food Allergies: none reported       Wound(s):       Comments    7/3: Pt with a 15 lb weight gain from admit per EMR recorded weight. MD addressing. Pt placed on a 1000 mL fluid restricted diet. Consuming 100%. Labs improving. Will continue to monitor during stay.      Anthropometrics    Height: 5' 8.5" (174 cm) Height Method: Measured  Last Weight: 98.6 kg (217 lb 6 oz) (24 0529) Weight Method: Standard Scale  BMI (Calculated): 32.6  BMI Classification: obese grade I (BMI 30-34.9)        Ideal Body Weight (IBW), Male: 157 lb     % Ideal Body Weight, Male (lb): 128.66 %                 Usual Body Weight (UBW), k.6 kg  % Usual Body Weight: 107.87     Usual Weight Provided By: EMR weight history    Wt Readings from Last 5 Encounters:   24 98.6 kg (217 lb 6 oz)   11/15/23 96.6 " kg (213 lb)   09/08/22 88.6 kg (195 lb 6.4 oz)   07/27/22 85.3 kg (188 lb)   11/18/21 93 kg (205 lb 0.4 oz)     Weight Change(s) Since Admission: 15 lb weight gain from admit per EMR recorded weight.  Admit Weight: 91.6 kg (202 lb) (06/27/24 1428)      Patient Education    Not applicable.    Monitoring & Evaluation     Dietitian will monitor food and beverage intake, energy intake, weight, weight change, electrolyte/renal panel, glucose/endocrine profile, and gastrointestinal profile.  Nutrition Risk/Follow-Up: low (follow-up in 5-7 days)  Patients assigned 'low nutrition risk' status do not qualify for a full nutritional assessment but will be monitored and re-evaluated in a 5-7 day time period. Please consult if re-evaluation needed sooner.

## 2024-07-03 NOTE — PROGRESS NOTES
[facility]  [address] [address]  Phone: [phone]    (Hospital) Internal Medicine  Progress Note      PATIENT NAME: Slick Kamara  MRN: [id number]  TODAY'S DATE: 07/02/2024  ADMIT DATE: 6/27/2024    SUBJECTIVE     PRINCIPLE PROBLEM: SVT (supraventricular tachycardia)    INTERVAL HISTORY:    7/2/2024  Patient shows modest improvement in his overall picture.  Clinically his sodium levels are increasing.  He is back to his fairly normal mentation.  He is still short of breath mildly even laid in bed.  He was no accessory muscle usage except for with the exertion.  He still has significant cough that he says has paroxysms and pain associated with it in the right side.  Norco helps.  He was having active sputum production that is different upon his admit where it was very scant.  He is getting breathing treatments and mucolytics.  He received a dose of Lasix and diuresed well.    Review of patient's allergies indicates:  No Known Allergies    ROS-14 point review of systems except as mentioned above    OBJECTIVE     VITAL SIGNS (Most Recent)  Temp: 97.5 °F (36.4 °C) (07/02/24 2026)  Pulse: 90 (07/02/24 2026)  Resp: 18 (07/02/24 1919)  BP: 102/69 (07/02/24 2026)  SpO2: 95 % (07/02/24 2026)    VENTILATION STATUS  Resp: 18 (07/02/24 1919)  SpO2: 95 % (07/02/24 2026)           I & O (Last 24H):  Intake/Output Summary (Last 24 hours) at 7/2/2024 2121  Last data filed at 7/2/2024 1846  Gross per 24 hour   Intake 720 ml   Output 1250 ml   Net -530 ml       WEIGHTS  Wt Readings from Last 3 Encounters:   06/27/24 1428 91.6 kg (202 lb)   11/15/23 0401 96.6 kg (213 lb)   09/08/22 0124 88.6 kg (195 lb 6.4 oz)       Physical Exam    Patient was alert orient x3.  Cranial nerves 2-12 are intact no JV D.  Nasal cannula oxygen.  He was a productive cough he has tender on the right side of his ribs.  Crackles at the bases of the right side.  The apices are clear bilaterally abdomen is soft  nontender obese nondistended.  No clubbing cyanosis or edema bilateral lower extremities.    SCHEDULED MEDS:   atorvastatin  10 mg Oral QHS    azithromycin  500 mg Intravenous Q24H    docusate  50 mg Oral Daily    enoxparin  40 mg Subcutaneous Daily    lisinopriL  2.5 mg Oral QAM    metoprolol succinate  25 mg Oral Daily    mupirocin   Nasal BID    pantoprazole  40 mg Oral Daily       CONTINUOUS INFUSIONS:    PRN MEDS:  Current Facility-Administered Medications:     acetaminophen, 650 mg, Oral, Q6H PRN    acetylcysteine 200 mg/ml (20%), 2 mL, Nebulization, Q6H PRN    albuterol-ipratropium, 3 mL, Nebulization, Q6H PRN    ALPRAZolam, 0.5 mg, Oral, TID PRN    benzonatate, 200 mg, Oral, TID PRN    bisacodyL, 10 mg, Rectal, Daily PRN    hydrocodone-apap 7.5-325 MG/15 ML, 15 mL, Oral, Q8H PRN    melatonin, 6 mg, Oral, Nightly PRN    morphine, 1 mg, Intravenous, Q4H PRN    ondansetron, 4 mg, Intravenous, Q8H PRN    promethazine, 12.5 mg, Oral, Q6H PRN    sodium chloride 0.9%, 10 mL, Intravenous, PRN    sodium chloride 0.9%, 10 mL, Intravenous, Q12H PRN    LABS AND DIAGNOSTICS     CBC LAST 3 DAYS  Recent Labs   Lab 06/30/24  0423 07/01/24  0328 07/02/24  0204   WBC 9.70 8.77 8.75   RBC 3.19* 2.81* 3.21*   HGB 10.0* 8.9* 10.1*   HCT 29.8* 26.1* 30.1*   MCV 93.4 92.9 93.8   MCH 31.3* 31.7* 31.5*   MCHC 33.6 34.1 33.6   RDW 13.7 13.4 13.5    152 306   MPV 10.7* 11.8* 10.0       COAGULATION LAST 3 DAYS  Recent Labs   Lab 06/27/24  1429   INR 1.2   APTT 33.1       CHEMISTRY LAST 3 DAYS  Recent Labs   Lab 06/29/24  0342 06/30/24  0124 06/30/24  0424 07/01/24  0328 07/01/24  1714 07/02/24  0204   *   < > 124* 119* 127* 126*   K 4.0   < > 4.2 4.5 4.6 4.4   CL 99   < > 96* 92* 96* 96*   CO2 20*   < > 19* 18* 22* 21*   BUN 22.0   < > 25.0 25.0 22.0 21.0   CREATININE 0.86   < > 0.79 0.81 0.77 0.80   CALCIUM 8.5*   < > 8.7* 8.5* 9.2 9.4   MG 2.40  --  2.40 2.30  --  2.40   ALBUMIN 2.9*  --   --   --  2.6* 2.7*   ALKPHOS 82  " --   --   --  111 106   ALT 27  --   --   --  59* 55   AST 26  --   --   --  43* 37*   BILITOT 1.1  --   --   --  0.7 0.7    < > = values in this interval not displayed.       CARDIAC PROFILE LAST 3 DAYS  Recent Labs   Lab 06/27/24  1429 06/30/24  0124 06/30/24  0423   BNP 1,038.6*  --  1,119.3*   CPK  --  61  --    CPKMB  --  1.2  --    TROPONINI 0.017 0.022  --        ENDOCRINE LAST 3 DAYS  Recent Labs   Lab 06/27/24  1429   TSH 1.231       LAST ARTERIAL BLOOD GAS  ABG  No results for input(s): "PH", "PO2", "PCO2", "HCO3", "BE" in the last 168 hours.    LAST 7 DAYS MICROBIOLOGY   Microbiology Results (last 7 days)       Procedure Component Value Units Date/Time    Blood culture #1 **CANNOT BE ORDERED STAT** [[id number]]  (Normal) Collected: 06/27/24 1608    Order Status: Completed Specimen: Blood Updated: 07/02/24 0801     Blood Culture No Growth At 96 Hours    Blood culture #2 **CANNOT BE ORDERED STAT** [[id number]]  (Normal) Collected: 06/27/24 1615    Order Status: Completed Specimen: Blood Updated: 07/02/24 0801     Blood Culture No Growth At 96 Hours            MOST RECENT IMAGING  X-Ray Chest 1 View  Narrative: EXAMINATION:  XR CHEST 1 VIEW    CLINICAL HISTORY:  Sob.;, .    COMPARISON:  06/27/2024    FINDINGS:  An AP view or more reveals heart to be enlarged.  The trachea is midline.  There is patchy hazy opacity of the right lung base and left mid and lower lung.  Degenerative changes and curvature are noted to the thoracic spine.  There is elevation of the right hemidiaphragm.  Impression: 1. Mild cardiomegaly  2. Patchy infiltrate and or atelectasis left mid and lower lung and right lung base  3. Elevated right hemidiaphragm    Electronically signed by: Isidoro Yates  Date:    07/01/2024  Time:    11:15      [facility]  Results for orders placed during the hospital encounter of 06/27/24    Echo    Interpretation Summary    Left Ventricle: The left ventricle is moderately dilated. Increased wall thickness. " Severe global hypokinesis present. There is severely reduced systolic function with a visually estimated ejection fraction of 10 -15%.    Right Ventricle: Mild right ventricular enlargement.    Left Atrium: Left atrium is moderately dilated.    Right Atrium: Right atrium is mildly dilated.    Aortic Valve: The aortic valve is a trileaflet valve. Mildly calcified cusps.    Mitral Valve: There is mild regurgitation.    Pulmonic Valve: There is moderate regurgitation.      CURRENT/PREVIOUS VISIT EKG  Results for orders placed or performed during the hospital encounter of 06/27/24   EKG 12-lead    Collection Time: 07/02/24  2:09 AM   Result Value Ref Range    QRS Duration 100 ms    OHS QTC Calculation 431 ms    Narrative    Test Reason : R00.0,    Vent. Rate : 131 BPM     Atrial Rate : 131 BPM     P-R Int : 192 ms          QRS Dur : 100 ms      QT Int : 292 ms       P-R-T Axes : 000 -47 063 degrees     QTc Int : 431 ms    Sinus tachycardia with Premature ventricular complexes or Fusion complexes  Left axis deviation  Anterior infarct (cited on or before 30-JUN-2024)  Abnormal ECG  When compared with ECG of 30-JUN-2024 01:24,  Fusion complexes are now Present  Premature ventricular complexes are now Present  [place] interval has decreased    Referred By: [email or web address]   SELF           Confirmed By:        ASSESSMENT/PLAN:     Active Hospital Problems    Diagnosis    *SVT (supraventricular tachycardia)    Shortness of breath    Tachycardia    Community acquired pneumonia    Acute on chronic systolic congestive heart failure, NYHA class 3       ASSESSMENT & PLAN:   Patient was SVT resolved.  Has stage III heart failure according to his baseline.  EF now 10-15% will discuss with his cardiologist whether I ICD indicated in the setting of previous viral myocarditis.  Further cardiac workup indicated?  Will discuss with Cardiology.  I am uncertain of the patient's coronary status as well.  (patient also has a degree of potential high  output heart failure due to alcohol in the last 6-12 months. ).    Continue cool community-acquired antibiotics for his pneumonia.  Will monitor that clinically.  He is doing better in having more mucolytics.    For his cough, give him some promethazine this evening to see if that will cooled off his cough.  Will were used in his not working now.  F2     RECOMMENDATIONS:  DVT prophylaxis is Lovenox.  GI prophylaxis pantoprazole          Vern Mitchell III, MD  Department of Hospital Medicine ([facility])   Date of Service: 07/02/2024  9:21 PM

## 2024-07-03 NOTE — PROGRESS NOTES
[facility]  [address] [address]  Phone: [phone]    (Hospital) Internal Medicine  Progress Note      PATIENT NAME: Slick Kamara  MRN: [id number]  TODAY'S DATE: 07/03/2024  ADMIT DATE: 6/27/2024    SUBJECTIVE     PRINCIPLE PROBLEM: Community acquired pneumonia    INTERVAL HISTORY:    7/3/2024  PATIENT WAS SYMPTOMATICALLY STABLE TODAY HOWEVER AROUND 1:00 A.M. HE STATED THAT HE FELT THAT HIS HEART RATES JUMPED UP.  NURSE CALLED ME IN HE LOOKS LIKE HE HAS THE L1-3 OR 1 2 FOR A FLUTTER WITH A RATE OF ABOUT 130.  HE HAS NOT OVERLY SYMPTOMATIC.  WE ARE WAITING ON CARDIAC ENZYMES.    THE PATIENT IS ON ROOM AIR AND RECEIVED 1 DOSE OF P.O. LASIX 20 MG THIS MORNING.  HIS LABS WERE STABLE    The patient was denying any chest pain at this time.  Troponins are pending    Review of patient's allergies indicates:  No Known Allergies    ROS-14 point review of systems except as mentioned above.    OBJECTIVE     VITAL SIGNS (Most Recent)  Temp: 98 °F (36.7 °C) (07/03/24 1121)  Pulse: 89 (07/03/24 1121)  Resp: 20 (07/03/24 0728)  BP: 97/62 (07/03/24 1121)  SpO2: 98 % (07/03/24 1121)    VENTILATION STATUS  Resp: 20 (07/03/24 0728)  SpO2: 98 % (07/03/24 1121)           I & O (Last 24H):  Intake/Output Summary (Last 24 hours) at 7/3/2024 1404  Last data filed at 7/3/2024 0800  Gross per 24 hour   Intake 370 ml   Output 1000 ml   Net -630 ml       WEIGHTS  Wt Readings from Last 3 Encounters:   07/03/24 0529 98.6 kg (217 lb 6 oz)   06/27/24 1428 91.6 kg (202 lb)   11/15/23 0401 96.6 kg (213 lb)   09/08/22 0124 88.6 kg (195 lb 6.4 oz)       Physical Exam    On physical exam patient was alert orient x3.  Nurse could Sandy is with the bedside with me.  Cranial nerves 2-12 are intact no JVD.  Patient was in a steady about 120 rate at the bedside.  This is on auscultation.  His lungs are mildly coarse at the right lower lobe.  His abdomen is soft nontender somewhat protuberant +1 edema  bilateral lower extremities.    SCHEDULED MEDS:   atorvastatin  10 mg Oral QHS    azithromycin  500 mg Intravenous Q24H    docusate  50 mg Oral Daily    enoxparin  40 mg Subcutaneous Daily    furosemide  20 mg Oral Daily    lisinopriL  2.5 mg Oral QAM    metoprolol succinate  25 mg Oral Daily    pantoprazole  40 mg Oral Daily    potassium chloride  10 mEq Oral Daily       CONTINUOUS INFUSIONS:    PRN MEDS:  Current Facility-Administered Medications:     acetaminophen, 650 mg, Oral, Q6H PRN    acetylcysteine 200 mg/ml (20%), 2 mL, Nebulization, Q6H PRN    albuterol-ipratropium, 3 mL, Nebulization, Q6H PRN    ALPRAZolam, 0.5 mg, Oral, TID PRN    benzonatate, 200 mg, Oral, TID PRN    bisacodyL, 10 mg, Rectal, Daily PRN    hydrocodone-apap 7.5-325 MG/15 ML, 15 mL, Oral, Q8H PRN    melatonin, 6 mg, Oral, Nightly PRN    metoprolol, 5 mg, Intravenous, Q5 Min PRN    morphine, 1 mg, Intravenous, Q4H PRN    ondansetron, 4 mg, Intravenous, Q8H PRN    promethazine, 12.5 mg, Oral, Q6H PRN    sodium chloride 0.9%, 10 mL, Intravenous, PRN    sodium chloride 0.9%, 10 mL, Intravenous, Q12H PRN    LABS AND DIAGNOSTICS     CBC LAST 3 DAYS  Recent Labs   Lab 07/01/24 0328 07/02/24  0204 07/03/24  0319   WBC 8.77 8.75 7.55   RBC 2.81* 3.21* 2.92*   HGB 8.9* 10.1* 9.2*   HCT 26.1* 30.1* 27.4*   MCV 92.9 93.8 93.8   MCH 31.7* 31.5* 31.5*   MCHC 34.1 33.6 33.6   RDW 13.4 13.5 13.8    306 329   MPV 11.8* 10.0 9.6       COAGULATION LAST 3 DAYS  Recent Labs   Lab 06/27/24  1429   INR 1.2   APTT 33.1       CHEMISTRY LAST 3 DAYS  Recent Labs   Lab 07/01/24  0328 07/01/24  1714 07/02/24  0204 07/03/24  0319   * 127* 126* 131*   K 4.5 4.6 4.4 3.9   CL 92* 96* 96* 100   CO2 18* 22* 21* 25   BUN 25.0 22.0 21.0 19.0   CREATININE 0.81 0.77 0.80 0.70*   CALCIUM 8.5* 9.2 9.4 8.9   MG 2.30  --  2.40 2.40   ALBUMIN  --  2.6* 2.7* 2.4*   ALKPHOS  --  111 106 101   ALT  --  59* 55 53   AST  --  43* 37* 34   BILITOT  --  0.7 0.7 0.6  "      CARDIAC PROFILE LAST 3 DAYS  Recent Labs   Lab 06/27/24  1429 06/30/24  0124 06/30/24  0423 07/03/24  0319   BNP 1,038.6*  --  1,119.3* 1,632.7*   CPK  --  61  --   --    CPKMB  --  1.2  --   --    TROPONINI 0.017 0.022  --   --        ENDOCRINE LAST 3 DAYS  Recent Labs   Lab 06/27/24  1429   TSH 1.231       LAST ARTERIAL BLOOD GAS  ABG  No results for input(s): "PH", "PO2", "PCO2", "HCO3", "BE" in the last 168 hours.    LAST 7 DAYS MICROBIOLOGY   Microbiology Results (last 7 days)       Procedure Component Value Units Date/Time    Blood culture #1 **CANNOT BE ORDERED STAT** [[id number]]  (Normal) Collected: 06/27/24 1608    Order Status: Completed Specimen: Blood Updated: 07/03/24 0801     Blood Culture No Growth at 5 days    Blood culture #2 **CANNOT BE ORDERED STAT** [[id number]]  (Normal) Collected: 06/27/24 1615    Order Status: Completed Specimen: Blood Updated: 07/03/24 0801     Blood Culture No Growth at 5 days            MOST RECENT IMAGING  X-Ray Chest 1 View  Narrative: EXAMINATION:  XR CHEST 1 VIEW    CLINICAL HISTORY:  Sob.;, .    COMPARISON:  06/27/2024    FINDINGS:  An AP view or more reveals heart to be enlarged.  The trachea is midline.  There is patchy hazy opacity of the right lung base and left mid and lower lung.  Degenerative changes and curvature are noted to the thoracic spine.  There is elevation of the right hemidiaphragm.  Impression: 1. Mild cardiomegaly  2. Patchy infiltrate and or atelectasis left mid and lower lung and right lung base  3. Elevated right hemidiaphragm    Electronically signed by: Isidoro Yates  Date:    07/01/2024  Time:    11:15      [facility]  Results for orders placed during the hospital encounter of 06/27/24    Echo    Interpretation Summary    Left Ventricle: The left ventricle is moderately dilated. Increased wall thickness. Severe global hypokinesis present. There is severely reduced systolic function with a visually estimated ejection fraction of 10 -15%.   "  Right Ventricle: Mild right ventricular enlargement.    Left Atrium: Left atrium is moderately dilated.    Right Atrium: Right atrium is mildly dilated.    Aortic Valve: The aortic valve is a trileaflet valve. Mildly calcified cusps.    Mitral Valve: There is mild regurgitation.    Pulmonic Valve: There is moderate regurgitation.      CURRENT/PREVIOUS VISIT EKG  Results for orders placed or performed during the hospital encounter of 06/27/24   EKG 12-lead    Collection Time: 07/03/24  1:38 PM   Result Value Ref Range    QRS Duration 102 ms    [facility] QTC Calculation 518 ms    Narrative    Test Reason : R00.0,    Vent. Rate : 129 BPM     Atrial Rate : 125 BPM     P-R Int : 000 ms          QRS Dur : 102 ms      QT Int : 354 ms       P-R-T Axes : 000 -43 049 degrees     QTc Int : 518 ms    Accelerated Junctional rhythm with retrograde conduction  Left axis deviation  Possible Anterior infarct (cited on or before 30-JUN-2024)  Abnormal ECG  When compared with ECG of 02-JUL-2024 02:09,  Junctional rhythm has replaced Sinus rhythm    Referred By: [email or web address]   SELF           Confirmed By:        ASSESSMENT/PLAN:     Active Hospital Problems    Diagnosis    *Community acquired pneumonia    SVT (supraventricular tachycardia)    Shortness of breath    Tachycardia    Acute on chronic systolic congestive heart failure, NYHA class 3       ASSESSMENT & PLAN:   Will continue his antibiotics.  Will restart his cefepime.  I have discussed with pharmacist Jalen.  Appreciate his assistance.      Discussed the case with his cardiologist Dr. Lou.  He has concerned about the conversion to a fast rate like that.  He would like to assess him in [place].  He also mentioned that he talked to the patient about ICD in the recent past and the patient declined.  I will rediscuss this with the patient and indicate likelihood of transferring him is more beneficial than staying out here.  In that case I will get in touch with the nursing supervisor in  effect transfer.      Continue gentle diuresis but in his case we might give him a little bit of fluids this afternoon.  Differential this case could be PE however patient was admitted on Lovenox.  Patient was not any pain in his not having any competing pharmacologic chemical stimulus such as coffee or caffeine anything like that.      RECOMMENDATIONS:  DVT prophylaxis is Lovenox. GI prophylaxis is pantoprazole.         Vern Mitchell III, MD  Department of Hospital Medicine ([facility])   Date of Service: 07/03/2024  2:04 PM

## 2024-07-04 LAB
OHS QRS DURATION: 102 MS
OHS QTC CALCULATION: 518 MS

## 2024-07-04 NOTE — HOSPITAL COURSE
Patient was hospital course was initially complicated by some supraventricular tachycardia and tachyarrhythmias that was managed in the ICU with some Cardizem drip.  Patient was a CT that was initially negative for PE but showed a variable ensuing right lower lobe pneumonia.  He was placed on initially Rocephin and azithromycin and because of his risk for high mortality based off of his EF that ultimately was about 10-15%, recommendations are to cover for anymore concerning pathogens such as Pseudomonas and ESBL.  We covered him therefore with cefepime while along with the azithromycin.    The patient is hospital course was further complicated by some hyponatremia I think that was due to volume overload on the weekend while I was not on-call.  I fluid restricted him gave him a dose of Lasix because he was short of breath.  Initially when he came in he was not requiring but supplemental oxygen.  On Monday he did.  His O2 sats were in the 80s.  He was having difficulty walking.  I will diurese him gently with 20 mg IV Lasix x2 days daily and then gave him a dose of 20 mg p.o. this morning.  The patient's sodium increased appropriately of note, his sodium I feel is likely chronically low due to heart failure and his alcohol intake which has been daily for the last year (since his wife ).  He drinks about climato's per day.    His echocardiogram ;   Left Ventricle: The left ventricle is moderately dilated. Increased wall thickness. Severe global hypokinesis present. There is severely reduced systolic function with a visually estimated ejection fraction of 10 -15%.    Right Ventricle: Mild right ventricular enlargement.    Left Atrium: Left atrium is moderately dilated.    Right Atrium: Right atrium is mildly dilated.    Aortic Valve: The aortic valve is a trileaflet valve. Mildly calcified cusps.    Mitral Valve: There is mild regurgitation.    Pulmonic Valve: There is moderate regurgitation.      It was until today  the patient remained stable.  He was started having some probable atrial flutter with trivially elevated cardiac enzymes and no ST segment changes.  It appeared to be atrial flutter with a rate of 130 fixed.  He was given 5 mg IV Lopressor and this resolved his heart rate back down to the 80s.  My plan was ultimately to discuss his case with his cardiologist Dr. Lou, I spoke to him this afternoon and discussed not only the patient was recent history for the last 6 months, his decrement in his EF, but also his hospital stay and relative stability of his pneumonia however within the setting of intermittent atrial ventricular arrhythmias.  He noted that the patient had been offered a electrophysiology appointment in the past but the patient declined at the time.  We both felt that in this current setting.  The patient should see Cardiology with the electrophysiology for possible ICD placement.  That would require transfer and therefore that is what we thought was in the best interest of the patient therefore effected this afternoon.    The patient will follow up with Dr. Lou at the [facility] once he was transferred.  I will defer management of his pneumonia based off of the aforementioned and his improvement.    I will follow him up as an outpatient once he is been managed by Cardiology post discharge from the [facility].  I appreciate their quick indecisive assistance and management of our patient.

## 2024-07-04 NOTE — DISCHARGE SUMMARY
[facility] - Medical Surgical Unit  Hospital Medicine  Discharge Summary      Patient Name: Slick Kamara  MRN: [id number]  [facility]: [id number]  Patient Class: IP- Inpatient  Admission Date: 6/27/2024  Hospital Length of Stay: 6 days  Discharge Date and Time:  07/03/2024 10:44 PM  Attending Physician: No att. providers found   Discharging Provider: Vern Mitchell III, MD  Primary Care Provider: Vern Mitchell III, MD    Primary Care Team: Networked reference to record PCT     HPI:   69-year-old pleasant white male with a history of an ischemic cardiomyopathy proximally 10 years ago with a residual depressed EF of a proximally 20-30%.  His cardiologist is Dr. Lou in [place].  He was last echo was about a year ago the patient stated.  I saw him in the office last Wednesday at which point he had a cough but no chest pain.  He was given an order for chest x-ray and performed that yesterday.  It did not show anything concerning but yesterday started having significant chest pain that was right-sided and almost flank as well.  He decided to come in the emergency room and get checked out.  He had on CT a negative CT for PE but a significantly developing early right-sided pneumonia for which he is being admitted.  Patient denies any rigors or fever but is having a cough and pleuritic cough at times that he says is incessantly uncontrollable.      Patient was also in SVT was placed on amiodarone drip and broken to sinus rhythm once he arrived proximally in ICU.  He would received about 2 L of fluids.  He is being kept on the amiodarone protocol.    * No surgery found *      Hospital Course:   Patient was hospital course was initially complicated by some supraventricular tachycardia and tachyarrhythmias that was managed in the ICU with some Cardizem drip.  Patient was a CT that was initially negative for PE but showed a variable ensuing right lower lobe pneumonia.  He was placed on initially Rocephin and  azithromycin and because of his risk for high mortality based off of his EF that ultimately was about 10-15%, recommendations are to cover for anymore concerning pathogens such as Pseudomonas and ESBL.  We covered him therefore with cefepime while along with the azithromycin.    The patient is hospital course was further complicated by some hyponatremia I think that was due to volume overload on the weekend while I was not on-call.  I fluid restricted him gave him a dose of Lasix because he was short of breath.  Initially when he came in he was not requiring but supplemental oxygen.  On Monday he did.  His O2 sats were in the 80s.  He was having difficulty walking.  I will diurese him gently with 20 mg IV Lasix x2 days daily and then gave him a dose of 20 mg p.o. this morning.  The patient's sodium increased appropriately of note, his sodium I feel is likely chronically low due to heart failure and his alcohol intake which has been daily for the last year (since his wife ).  He drinks about climato's per day.    His echocardiogram ;   Left Ventricle: The left ventricle is moderately dilated. Increased wall thickness. Severe global hypokinesis present. There is severely reduced systolic function with a visually estimated ejection fraction of 10 -15%.    Right Ventricle: Mild right ventricular enlargement.    Left Atrium: Left atrium is moderately dilated.    Right Atrium: Right atrium is mildly dilated.    Aortic Valve: The aortic valve is a trileaflet valve. Mildly calcified cusps.    Mitral Valve: There is mild regurgitation.    Pulmonic Valve: There is moderate regurgitation.      It was until today the patient remained stable.  He was started having some probable atrial flutter with trivially elevated cardiac enzymes and no ST segment changes.  It appeared to be atrial flutter with a rate of 130 fixed.  He was given 5 mg IV Lopressor and this resolved his heart rate back down to the 80s.  My plan was  ultimately to discuss his case with his cardiologist Dr. Lou, I spoke to him this afternoon and discussed not only the patient was recent history for the last 6 months, his decrement in his EF, but also his hospital stay and relative stability of his pneumonia however within the setting of intermittent atrial ventricular arrhythmias.  He noted that the patient had been offered a electrophysiology appointment in the past but the patient declined at the time.  We both felt that in this current setting.  The patient should see Cardiology with the electrophysiology for possible ICD placement.  That would require transfer and therefore that is what we thought was in the best interest of the patient therefore effected this afternoon.    The patient will follow up with Dr. Lou at the [facility] once he was transferred.  I will defer management of his pneumonia based off of the aforementioned and his improvement.    I will follow him up as an outpatient once he is been managed by Cardiology post discharge from the [facility].  I appreciate their quick indecisive assistance and management of our patient.         Goals of Care Treatment Preferences:  Code Status: Full Code      Consults:   Consults (From admission, onward)          Status Ordering Provider     Inpatient consult to Telemedicine-Card  Once        Provider:  Heath Castellanos MD    Completed NELLIE DURHAM            Pulmonary  * Community acquired pneumonia  Patient has a diagnosis of pneumonia. The cause of the pneumonia is suspected to be bacterial in etiology but organism is not known. The pneumonia is improving. The patient has the following signs/symptoms of pneumonia: persistent hypoxia , cough, shortness of breath, and chest pain. The patient does not have a current oxygen requirement and the patient does not have a home oxygen requirement. I have reviewed the pertinent imaging. The following cultures have been collected:  PCR The culture results  are listed below.     Current antimicrobial regimen consists of the antibiotics listed below. Will monitor patient closely and Adjust treatment plan as follows I am going to add a 4th generation cephalosporin with Pseudomonas coverage just because of the patient's high-risk.    Antibiotics (From admission, onward)      Start     Stop Route Frequency Ordered    06/28/24 0900  mupirocin 2 % ointment         07/03/24 0859 Nasl 2 times daily 06/28/24 0728            Microbiology Results (last 7 days)       Procedure Component Value Units Date/Time    Blood culture #1 **CANNOT BE ORDERED STAT** [[id number]]  (Normal) Collected: 06/27/24 1608    Order Status: Completed Specimen: Blood Updated: 07/03/24 0801     Blood Culture No Growth at 5 days    Blood culture #2 **CANNOT BE ORDERED STAT** [[id number]]  (Normal) Collected: 06/27/24 1615    Order Status: Completed Specimen: Blood Updated: 07/03/24 0801     Blood Culture No Growth at 5 days            Shortness of breath  Continue breathing treatments, Mucomyst and antibiotics.  Follow up clinically.    Cardiac/Vascular  Acute on chronic systolic congestive heart failure, NYHA class 3  Patient is identified as having Systolic (HFrEF) heart failure that is Acute on chronic. CHF is currently controlled. Latest ECHO performed and demonstrates- No results found for this or any previous visit.  . Continue Beta Blocker and ACE/ARB and monitor clinical status closely. Monitor on telemetry. Patient is off CHF pathway.  Monitor strict Is&Os and daily weights.  Place on fluid restriction of  patient was seems to be a little on the dry side . Cardiology has been consulted. Continue to stress to patient importance of self efficacy and  on diet for CHF. Last BNP reviewed- and noted below   Recent Labs   Lab 07/03/24  0319   BNP 1,632.7*         Tachycardia  Resolved with IV Lopressor this afternoon.  Discuss with Cardiology about transplant in [place] for further  electrophysiology workup.    SVT (supraventricular tachycardia)  Continue telemetry beta blocker      Final Active Diagnoses:    Diagnosis Date Noted POA    PRINCIPAL PROBLEM:  Community acquired pneumonia [J18.9] 06/27/2024 Yes    SVT (supraventricular tachycardia) [I47.10] 06/27/2024 Yes    Shortness of breath [R06.02] 06/27/2024 Yes    Tachycardia [R00.0] 06/27/2024 Yes    Acute on chronic systolic congestive heart failure, NYHA class 3 [I50.23] 06/27/2024 Yes      Problems Resolved During this Admission:       Discharged Condition: fair    Disposition: Another Health Care Inst*    Follow Up:    Patient Instructions:   No discharge procedures on file.    Significant Diagnostic Studies: Labs: CMP   Recent Labs   Lab 07/02/24  0204 07/03/24  0319   * 131*   K 4.4 3.9   CL 96* 100   CO2 21* 25   BUN 21.0 19.0   CREATININE 0.80 0.70*   CALCIUM 9.4 8.9   ALBUMIN 2.7* 2.4*   BILITOT 0.7 0.6   ALKPHOS 106 101   AST 37* 34   ALT 55 53   , CBC   Recent Labs   Lab 07/02/24  0204 07/03/24  0319   WBC 8.75 7.55   HGB 10.1* 9.2*   HCT 30.1* 27.4*    329   , Troponin   Recent Labs   Lab 06/27/24  1429 06/30/24  0124 07/03/24  1342   TROPONINI 0.017 0.022 0.014   , and All labs within the past 24 hours have been reviewed  Radiology:  See echocardiogram attached  Cardiac Graphics: Echocardiogram: 2D echo with color flow doppler: No results found for this or any previous visit. and Transthoracic echo (TTE) complete (Cupid Only):   Results for orders placed or performed during the hospital encounter of 06/27/24   Echo   Result Value Ref Range    BSA 2.1 m2    LVIDd 6.02 (A) 3.5 - 6.0 cm    LV Systolic Volume 158.64 mL    LV Systolic Volume Index 77.0 mL/m2    LVIDs 5.68 (A) 2.1 - 4.0 cm    LV Diastolic Volume 181.11 mL    LV Diastolic Volume Index 87.92 mL/m2    Left Ventricular End Systolic Volume by Teichholz Method 158.64 mL    Left Ventricular End Diastolic Volume by Teichholz Method 181.11 mL    IVS 1.12 (A) 0.6  - 1.1 cm    LVOT diameter 2.07 cm    LVOT area 3.4 cm2    FS 6 (A) 28 - 44 %    Left Ventricle Relative Wall Thickness 0.43 cm    Posterior Wall 1.29 (A) 0.6 - 1.1 cm    LV mass 317.52 g    LV Mass Index 154 g/m2    MV Peak E Sabino 0.85 m/s    MV Peak A Sabino 0.32 m/s    TR Max Sabino 1.29 m/s    E/A ratio 2.66     E wave deceleration time 227.60 msec    LVOT peak sabino 0.49 m/s    RV/LV Ratio 0.56 cm    LA size 0.00 cm    AV peak gradient 4 mmHg    Ao peak sabino 0.97 m/s    AV Velocity Ratio 0.51     PABLO by Velocity Ratio 1.70 cm²    Mr max sabino 2.90 m/s    MV mean gradient 1 mmHg    MV peak gradient 4 mmHg    MV stenosis pressure 1/2 time 66.01 ms    MV valve area p 1/2 method 3.33 cm2    MV VTI 25.0 cm    Triscuspid Valve Regurgitation Peak Gradient 7 mmHg    PV PEAK VELOCITY 0.48 m/s    PV peak gradient 1 mmHg    ZLVIDS 3.16     ZLVIDD -0.33     RVDD 3.35 cm    Narrative      Left Ventricle: The left ventricle is moderately dilated. Increased   wall thickness. Severe global hypokinesis present. There is severely   reduced systolic function with a visually estimated ejection fraction of   10 -15%.    Right Ventricle: Mild right ventricular enlargement.    Left Atrium: Left atrium is moderately dilated.    Right Atrium: Right atrium is mildly dilated.    Aortic Valve: The aortic valve is a trileaflet valve. Mildly calcified   cusps.    Mitral Valve: There is mild regurgitation.    Pulmonic Valve: There is moderate regurgitation.         Pending Diagnostic Studies:       None           Medications:  Transfer Medications (for Discharge Readmit only):   No current facility-administered medications for this encounter.     Current Outpatient Medications   Medication Sig Dispense Refill    atorvastatin (LIPITOR) 10 MG tablet Take 10 mg by mouth every evening.      furosemide (LASIX) 40 MG tablet Take 40 mg by mouth once daily.      lisinopriL (PRINIVIL,ZESTRIL) 2.5 MG tablet Take 2.5 mg by mouth once daily.      metoprolol  succinate (TOPROL-XL) 25 MG 24 hr tablet Take 1 tablet by mouth every morning.      spironolactone (ALDACTONE) 25 MG tablet Take 12.5 mg by mouth every morning.      cetirizine (ZYRTEC) 10 MG tablet Take 1 tablet (10 mg total) by mouth once daily. for 14 days 14 tablet 0    fluticasone propionate (FLONASE) 50 mcg/actuation nasal spray 2 sprays (100 mcg total) by Each Nostril route once daily. 15 g 0    gabapentin (NEURONTIN) 300 MG capsule Take 1 capsule by mouth 2 (two) times a day.      lisinopriL (PRINIVIL,ZESTRIL) 2.5 MG tablet Take 2.5 mg by mouth every morning.      methylPREDNISolone (MEDROL DOSEPACK) 4 mg tablet Take as directed on ely 1 each 0    metoprolol succinate (TOPROL-XL) 50 MG 24 hr tablet Take 25 mg by mouth once daily.      spironolactone (ALDACTONE) 25 MG tablet Take 25 mg by mouth once daily.       Facility-Administered Medications Ordered in Other Encounters   Medication Dose Route Frequency Provider Last Rate Last Admin    LIDOcaine (PF) 10 mg/ml (1%) injection 10 mg  1 mL Intradermal Once Chi Sanchez, DO           Indwelling Lines/Drains at time of discharge:   Lines/Drains/Airways       None                   Time spent on the discharge of patient: 40 minutes         Vern Mitchell III, MD  Department of Hospital Medicine  [facility] - Medical Surgical Unit

## 2024-07-04 NOTE — ASSESSMENT & PLAN NOTE
Patient has a diagnosis of pneumonia. The cause of the pneumonia is suspected to be bacterial in etiology but organism is not known. The pneumonia is improving. The patient has the following signs/symptoms of pneumonia: persistent hypoxia , cough, shortness of breath, and chest pain. The patient does not have a current oxygen requirement and the patient does not have a home oxygen requirement. I have reviewed the pertinent imaging. The following cultures have been collected:  PCR The culture results are listed below.     Current antimicrobial regimen consists of the antibiotics listed below. Will monitor patient closely and Adjust treatment plan as follows I am going to add a 4th generation cephalosporin with Pseudomonas coverage just because of the patient's high-risk.    Antibiotics (From admission, onward)    Start     Stop Route Frequency Ordered    06/28/24 0900  mupirocin 2 % ointment         07/03/24 0859 Nasl 2 times daily 06/28/24 0728          Microbiology Results (last 7 days)     Procedure Component Value Units Date/Time    Blood culture #1 **CANNOT BE ORDERED STAT** [[id number]]  (Normal) Collected: 06/27/24 1608    Order Status: Completed Specimen: Blood Updated: 07/03/24 0801     Blood Culture No Growth at 5 days    Blood culture #2 **CANNOT BE ORDERED STAT** [[id number]]  (Normal) Collected: 06/27/24 1615    Order Status: Completed Specimen: Blood Updated: 07/03/24 0801     Blood Culture No Growth at 5 days

## 2024-07-04 NOTE — ASSESSMENT & PLAN NOTE
Patient is identified as having Systolic (HFrEF) heart failure that is Acute on chronic. CHF is currently controlled. Latest ECHO performed and demonstrates- No results found for this or any previous visit.  . Continue Beta Blocker and ACE/ARB and monitor clinical status closely. Monitor on telemetry. Patient is off CHF pathway.  Monitor strict Is&Os and daily weights.  Place on fluid restriction of  patient was seems to be a little on the dry side . Cardiology has been consulted. Continue to stress to patient importance of self efficacy and  on diet for CHF. Last BNP reviewed- and noted below   Recent Labs   Lab 07/03/24  0319   BNP 1,632.7*

## 2024-07-04 NOTE — ASSESSMENT & PLAN NOTE
Resolved with IV Lopressor this afternoon.  Discuss with Cardiology about transplant in [place] for further electrophysiology workup.

## 2024-10-07 PROBLEM — J18.9 COMMUNITY ACQUIRED PNEUMONIA: Status: RESOLVED | Noted: 2024-06-27 | Resolved: 2024-10-07

## 2024-12-26 ENCOUNTER — HOSPITAL ENCOUNTER (INPATIENT)
Facility: HOSPITAL | Age: 69
LOS: 1 days | Discharge: HOME | DRG: 291 | End: 2024-12-27
Attending: EMERGENCY MEDICINE | Admitting: INTERNAL MEDICINE
Payer: MEDICARE

## 2024-12-26 DIAGNOSIS — R53.1 WEAKNESS: ICD-10-CM

## 2024-12-26 DIAGNOSIS — R06.00 DYSPNEA: ICD-10-CM

## 2024-12-26 PROBLEM — J90 PLEURAL EFFUSION: Status: ACTIVE | Noted: 2024-12-26

## 2024-12-26 LAB
ALBUMIN SERPL-MCNC: 3.6 G/DL (ref 3.4–4.8)
ALBUMIN/GLOB SERPL: 1.1 RATIO (ref 1.1–2)
ALP SERPL-CCNC: 114 UNIT/L (ref 40–150)
ALT SERPL-CCNC: 17 UNIT/L (ref 0–55)
ANION GAP SERPL CALC-SCNC: 9 MEQ/L
AST SERPL-CCNC: 16 UNIT/L (ref 5–34)
B PERT.PT PRMT NPH QL NAA+NON-PROBE: NOT DETECTED
BASOPHILS # BLD AUTO: 0.04 X10(3)/MCL
BASOPHILS NFR BLD AUTO: 0.4 %
BILIRUB SERPL-MCNC: 2.4 MG/DL
BNP BLD-MCNC: 2140.9 PG/ML
BUN SERPL-MCNC: 16 MG/DL (ref 8.4–25.7)
C PNEUM DNA NPH QL NAA+NON-PROBE: NOT DETECTED
CALCIUM SERPL-MCNC: 9.3 MG/DL (ref 8.8–10)
CHLORIDE SERPL-SCNC: 105 MMOL/L (ref 98–107)
CO2 SERPL-SCNC: 22 MMOL/L (ref 23–31)
CREAT SERPL-MCNC: 0.81 MG/DL (ref 0.72–1.25)
CREAT/UREA NIT SERPL: 20
EOSINOPHIL # BLD AUTO: 0.01 X10(3)/MCL (ref 0–0.9)
EOSINOPHIL NFR BLD AUTO: 0.1 %
ERYTHROCYTE [DISTWIDTH] IN BLOOD BY AUTOMATED COUNT: 15.3 % (ref 11.5–17)
FLUAV AG UPPER RESP QL IA.RAPID: NOT DETECTED
FLUBV AG UPPER RESP QL IA.RAPID: NOT DETECTED
GFR SERPLBLD CREATININE-BSD FMLA CKD-EPI: >60 ML/MIN/1.73/M2
GLOBULIN SER-MCNC: 3.3 GM/DL (ref 2.4–3.5)
GLUCOSE SERPL-MCNC: 102 MG/DL (ref 82–115)
HADV DNA NPH QL NAA+NON-PROBE: NOT DETECTED
HCOV 229E RNA NPH QL NAA+NON-PROBE: NOT DETECTED
HCOV HKU1 RNA NPH QL NAA+NON-PROBE: NOT DETECTED
HCOV NL63 RNA NPH QL NAA+NON-PROBE: NOT DETECTED
HCOV OC43 RNA NPH QL NAA+NON-PROBE: NOT DETECTED
HCT VFR BLD AUTO: 36.6 % (ref 42–52)
HGB BLD-MCNC: 12 G/DL (ref 14–18)
HMPV RNA NPH QL NAA+NON-PROBE: NOT DETECTED
HPIV1 RNA NPH QL NAA+NON-PROBE: NOT DETECTED
HPIV2 RNA NPH QL NAA+NON-PROBE: NOT DETECTED
HPIV3 RNA NPH QL NAA+NON-PROBE: NOT DETECTED
HPIV4 RNA NPH QL NAA+NON-PROBE: NOT DETECTED
IMM GRANULOCYTES # BLD AUTO: 0.02 X10(3)/MCL (ref 0–0.04)
IMM GRANULOCYTES NFR BLD AUTO: 0.2 %
INR PPP: 1.2
LACTATE SERPL-SCNC: 1.1 MMOL/L (ref 0.5–2.2)
LIPASE SERPL-CCNC: 14 U/L
LYMPHOCYTES # BLD AUTO: 1.52 X10(3)/MCL (ref 0.6–4.6)
LYMPHOCYTES NFR BLD AUTO: 17 %
M PNEUMO DNA NPH QL NAA+NON-PROBE: NOT DETECTED
MCH RBC QN AUTO: 31.2 PG (ref 27–31)
MCHC RBC AUTO-ENTMCNC: 32.8 G/DL (ref 33–36)
MCV RBC AUTO: 95.1 FL (ref 80–94)
MONOCYTES # BLD AUTO: 0.91 X10(3)/MCL (ref 0.1–1.3)
MONOCYTES NFR BLD AUTO: 10.2 %
NEUTROPHILS # BLD AUTO: 6.46 X10(3)/MCL (ref 2.1–9.2)
NEUTROPHILS NFR BLD AUTO: 72.1 %
OHS QRS DURATION: 98 MS
OHS QTC CALCULATION: 471 MS
PLATELET # BLD AUTO: 214 X10(3)/MCL (ref 130–400)
PMV BLD AUTO: 10.2 FL (ref 7.4–10.4)
POTASSIUM SERPL-SCNC: 4 MMOL/L (ref 3.5–5.1)
PROT SERPL-MCNC: 6.9 GM/DL (ref 5.8–7.6)
PROTHROMBIN TIME: 15.6 SECONDS (ref 12.5–14.5)
RBC # BLD AUTO: 3.85 X10(6)/MCL (ref 4.7–6.1)
RSV A 5' UTR RNA NPH QL NAA+PROBE: NOT DETECTED
RSV RNA NPH QL NAA+NON-PROBE: NOT DETECTED
RV+EV RNA NPH QL NAA+NON-PROBE: NOT DETECTED
SARS-COV-2 RNA RESP QL NAA+PROBE: NOT DETECTED
SODIUM SERPL-SCNC: 136 MMOL/L (ref 136–145)
TROPONIN I SERPL-MCNC: <0.01 NG/ML (ref 0–0.04)
WBC # BLD AUTO: 8.96 X10(3)/MCL (ref 4.5–11.5)

## 2024-12-26 PROCEDURE — 83880 ASSAY OF NATRIURETIC PEPTIDE: CPT | Performed by: EMERGENCY MEDICINE

## 2024-12-26 PROCEDURE — 25000003 PHARM REV CODE 250: Performed by: EMERGENCY MEDICINE

## 2024-12-26 PROCEDURE — 11000001 HC ACUTE MED/SURG PRIVATE ROOM

## 2024-12-26 PROCEDURE — 85025 COMPLETE CBC W/AUTO DIFF WBC: CPT | Performed by: EMERGENCY MEDICINE

## 2024-12-26 PROCEDURE — 83605 ASSAY OF LACTIC ACID: CPT | Performed by: EMERGENCY MEDICINE

## 2024-12-26 PROCEDURE — 80053 COMPREHEN METABOLIC PANEL: CPT | Performed by: EMERGENCY MEDICINE

## 2024-12-26 PROCEDURE — 93010 ELECTROCARDIOGRAM REPORT: CPT | Mod: ,,, | Performed by: INTERNAL MEDICINE

## 2024-12-26 PROCEDURE — 83690 ASSAY OF LIPASE: CPT | Performed by: EMERGENCY MEDICINE

## 2024-12-26 PROCEDURE — 63600175 PHARM REV CODE 636 W HCPCS: Performed by: EMERGENCY MEDICINE

## 2024-12-26 PROCEDURE — 84484 ASSAY OF TROPONIN QUANT: CPT | Performed by: EMERGENCY MEDICINE

## 2024-12-26 PROCEDURE — 96375 TX/PRO/DX INJ NEW DRUG ADDON: CPT

## 2024-12-26 PROCEDURE — 99900035 HC TECH TIME PER 15 MIN (STAT)

## 2024-12-26 PROCEDURE — 96365 THER/PROPH/DIAG IV INF INIT: CPT

## 2024-12-26 PROCEDURE — 93005 ELECTROCARDIOGRAM TRACING: CPT

## 2024-12-26 PROCEDURE — 36415 COLL VENOUS BLD VENIPUNCTURE: CPT | Performed by: EMERGENCY MEDICINE

## 2024-12-26 PROCEDURE — 85610 PROTHROMBIN TIME: CPT | Performed by: EMERGENCY MEDICINE

## 2024-12-26 PROCEDURE — 99285 EMERGENCY DEPT VISIT HI MDM: CPT | Mod: 25

## 2024-12-26 PROCEDURE — 87040 BLOOD CULTURE FOR BACTERIA: CPT | Performed by: EMERGENCY MEDICINE

## 2024-12-26 PROCEDURE — 0241U COVID/RSV/FLU A&B PCR: CPT | Performed by: EMERGENCY MEDICINE

## 2024-12-26 PROCEDURE — 87581 M.PNEUMON DNA AMP PROBE: CPT | Performed by: INTERNAL MEDICINE

## 2024-12-26 RX ORDER — LISINOPRIL 5 MG/1
5 TABLET ORAL ONCE
Status: DISCONTINUED | OUTPATIENT
Start: 2024-12-26 | End: 2024-12-27

## 2024-12-26 RX ORDER — SPIRONOLACTONE 25 MG/1
25 TABLET ORAL DAILY
COMMUNITY

## 2024-12-26 RX ORDER — POTASSIUM CHLORIDE 750 MG/1
10 CAPSULE, EXTENDED RELEASE ORAL ONCE
COMMUNITY

## 2024-12-26 RX ORDER — FUROSEMIDE 10 MG/ML
20 INJECTION INTRAMUSCULAR; INTRAVENOUS EVERY 12 HOURS
Status: DISCONTINUED | OUTPATIENT
Start: 2024-12-26 | End: 2024-12-27 | Stop reason: HOSPADM

## 2024-12-26 RX ORDER — CEFTRIAXONE 1 G/1
1 INJECTION, POWDER, FOR SOLUTION INTRAMUSCULAR; INTRAVENOUS
Status: COMPLETED | OUTPATIENT
Start: 2024-12-26 | End: 2024-12-26

## 2024-12-26 RX ORDER — CEFTRIAXONE 1 G/1
1 INJECTION, POWDER, FOR SOLUTION INTRAMUSCULAR; INTRAVENOUS
Status: DISCONTINUED | OUTPATIENT
Start: 2024-12-27 | End: 2024-12-27 | Stop reason: HOSPADM

## 2024-12-26 RX ORDER — ATORVASTATIN CALCIUM 10 MG/1
10 TABLET, FILM COATED ORAL NIGHTLY
Status: DISCONTINUED | OUTPATIENT
Start: 2024-12-26 | End: 2024-12-27

## 2024-12-26 RX ORDER — TALC
6 POWDER (GRAM) TOPICAL NIGHTLY PRN
Status: DISCONTINUED | OUTPATIENT
Start: 2024-12-26 | End: 2024-12-27 | Stop reason: HOSPADM

## 2024-12-26 RX ORDER — CLOTRIMAZOLE 10 MG/1
LOZENGE ORAL
Status: ON HOLD | COMMUNITY
End: 2024-12-26 | Stop reason: CLARIF

## 2024-12-26 RX ORDER — SODIUM CHLORIDE 0.9 % (FLUSH) 0.9 %
10 SYRINGE (ML) INJECTION
Status: DISCONTINUED | OUTPATIENT
Start: 2024-12-26 | End: 2024-12-27 | Stop reason: HOSPADM

## 2024-12-26 RX ORDER — LISINOPRIL 2.5 MG/1
2.5 TABLET ORAL DAILY
COMMUNITY

## 2024-12-26 RX ORDER — FUROSEMIDE 10 MG/ML
20 INJECTION INTRAMUSCULAR; INTRAVENOUS
Status: COMPLETED | OUTPATIENT
Start: 2024-12-26 | End: 2024-12-26

## 2024-12-26 RX ORDER — METOPROLOL TARTRATE 25 MG/1
25 TABLET, FILM COATED ORAL ONCE
Status: DISCONTINUED | OUTPATIENT
Start: 2024-12-26 | End: 2024-12-27

## 2024-12-26 RX ORDER — METOPROLOL SUCCINATE 50 MG/1
50 TABLET, EXTENDED RELEASE ORAL DAILY
Status: ON HOLD | COMMUNITY
End: 2024-12-27

## 2024-12-26 RX ORDER — DIGOXIN 125 MCG
0.12 TABLET ORAL
Status: ON HOLD | COMMUNITY
Start: 2024-07-10 | End: 2024-12-26

## 2024-12-26 RX ORDER — FUROSEMIDE 40 MG/1
40 TABLET ORAL
COMMUNITY

## 2024-12-26 RX ORDER — DIAZEPAM 10 MG/1
TABLET ORAL
Status: ON HOLD | COMMUNITY
End: 2024-12-26

## 2024-12-26 RX ORDER — ATORVASTATIN CALCIUM 20 MG/1
TABLET, FILM COATED ORAL
Status: ON HOLD | COMMUNITY
End: 2024-12-26 | Stop reason: DRUGHIGH

## 2024-12-26 RX ORDER — ATORVASTATIN CALCIUM 40 MG/1
40 TABLET, FILM COATED ORAL DAILY
COMMUNITY

## 2024-12-26 RX ADMIN — FUROSEMIDE 20 MG: 10 INJECTION, SOLUTION INTRAMUSCULAR; INTRAVENOUS at 12:12

## 2024-12-26 RX ADMIN — CEFTRIAXONE SODIUM 1 G: 1 INJECTION, POWDER, FOR SOLUTION INTRAMUSCULAR; INTRAVENOUS at 12:12

## 2024-12-26 RX ADMIN — AZITHROMYCIN MONOHYDRATE 500 MG: 500 INJECTION, POWDER, LYOPHILIZED, FOR SOLUTION INTRAVENOUS at 12:12

## 2024-12-26 NOTE — PLAN OF CARE
Problem: Adult Inpatient Plan of Care  Goal: Plan of Care Review  Outcome: Progressing  Goal: Patient-Specific Goal (Individualized)  Outcome: Progressing  Goal: Absence of Hospital-Acquired Illness or Injury  Outcome: Progressing  Goal: Optimal Comfort and Wellbeing  Outcome: Progressing  Goal: Readiness for Transition of Care  Outcome: Progressing     Problem: Heart Failure  Goal: Optimal Coping  Outcome: Progressing  Goal: Optimal Cardiac Output  Outcome: Progressing  Goal: Stable Heart Rate and Rhythm  Outcome: Progressing  Goal: Optimal Functional Ability  Outcome: Progressing  Goal: Fluid and Electrolyte Balance  Outcome: Progressing  Goal: Improved Oral Intake  Outcome: Progressing  Goal: Effective Oxygenation and Ventilation  Outcome: Progressing  Goal: Effective Breathing Pattern During Sleep  Outcome: Progressing     Problem: Pain Acute  Goal: Optimal Pain Control and Function  Outcome: Progressing

## 2024-12-26 NOTE — ED PROVIDER NOTES
Encounter Date: 12/26/2024       History     Chief Complaint   Patient presents with    Cough     C/o cough with blood, unable to sleep, feet swelling x 3 days. Hx CHF     HPI  69-year-old male history of CHF, GERD, hypertension, hyperlipidemia now with complaints of increasing shortness of breath, chest pain with coughing only and blood-tinged sputum x3 days.  Patient denies associated substernal chest pain, fever, chills, abdominal pain, increasing leg edema or calf pain.  Patient spironolactone was recently changed to every other day from daily in addition to his daily Lasix.  Patient denies ill contacts.  Review of patient's allergies indicates:  No Known Allergies  Past Medical History:   Diagnosis Date    CHF (congestive heart failure)     GERD (gastroesophageal reflux disease)     High cholesterol     HTN (hypertension)      Past Surgical History:   Procedure Laterality Date    COLONOSCOPY  12/28/2016    COLONOSCOPY N/A 07/28/2022    Procedure: COLONOSCOPY;  Surgeon: Vern Mitchell III, MD;  Location: [facility];  Service: Endoscopy;  Laterality: N/A;  Suboptimal prep. Diverticulosis without perforation.    FACIAL FRACTURE SURGERY      HERNIA REPAIR      HIP REPLACEMENT ARTHROPLASTY Left     KNEE ARTHROSCOPY Right     WRIST SURGERY Right      Family History   Problem Relation Name Age of Onset    Hypertension Mother      Hyperlipidemia Mother      Diabetes Mother      Breast cancer Mother      Pneumonia Father      Colon cancer Brother       Social History     Tobacco Use    Smoking status: Former    Smokeless tobacco: Never   Substance Use Topics    Alcohol use: Yes     Alcohol/week: 7.0 standard drinks of alcohol     Types: 7 Cans of beer per week    Drug use: Never     Review of Systems   All other systems reviewed and are negative.      Physical Exam     Initial Vitals [12/26/24 1020]   BP Pulse Resp Temp SpO2   131/72 83 (!) 22 98.4 °F (36.9 °C) 98 %      MAP       --         Physical Exam    Nursing  note and vitals reviewed.  Constitutional: He appears well-developed. He is cooperative.   HENT:   Head: Normocephalic and atraumatic.   Eyes: Conjunctivae, EOM and lids are normal. Pupils are equal, round, and reactive to light.   Neck: Trachea normal. Neck supple.   Normal range of motion.  Cardiovascular:  Normal rate, regular rhythm, normal heart sounds and intact distal pulses.           Pulmonary/Chest: No respiratory distress. He has no wheezes. He has rhonchi. He has rales. He exhibits no tenderness.   Abdominal: Abdomen is soft. Bowel sounds are normal. There is no abdominal tenderness.   Musculoskeletal:         General: Edema present. No tenderness. Normal range of motion.      Cervical back: Normal range of motion and neck supple.     Neurological: He is alert and oriented to person, place, and time.   Skin: Skin is warm and dry. No rash noted.   Psychiatric: He has a normal mood and affect. His speech is normal and behavior is normal. Judgment and thought content normal.         ED Course   Procedures  Labs Reviewed   COMPREHENSIVE METABOLIC PANEL - Abnormal       Result Value    Sodium 136      Potassium 4.0      Chloride 105      CO2 22 (*)     Glucose 102      Blood Urea Nitrogen 16.0      Creatinine 0.81      Calcium 9.3      Protein Total 6.9      Albumin 3.6      Globulin 3.3      Albumin/Globulin Ratio 1.1      Bilirubin Total 2.4 (*)           ALT 17      AST 16      eGFR >60      Anion Gap 9.0      BUN/Creatinine Ratio 20     CBC WITH DIFFERENTIAL - Abnormal    WBC 8.96      RBC 3.85 (*)     Hgb 12.0 (*)     Hct 36.6 (*)     MCV 95.1 (*)     MCH 31.2 (*)     MCHC 32.8 (*)     RDW 15.3      Platelet 214      MPV 10.2      Neut % 72.1      Lymph % 17.0      Mono % 10.2      Eos % 0.1      Basophil % 0.4      Lymph # 1.52      Neut # 6.46      Mono # 0.91      Eos # 0.01      Baso # 0.04      IG# 0.02      IG% 0.2     B-TYPE NATRIURETIC PEPTIDE - Abnormal    Natriuretic Peptide 2,140.9 (*)     PROTIME-INR - Abnormal    PT 15.6 (*)     INR 1.2     COVID/RSV/FLU A&B PCR - Normal    Influenza A PCR Not Detected      Influenza B PCR Not Detected      Respiratory Syncytial Virus PCR Not Detected      SARS-CoV-2 PCR Not Detected      Narrative:     The Xpert Xpress SARS-CoV-2/FLU/RSV plus is a rapid, multiplexed real-time PCR test intended for the simultaneous qualitative detection and differentiation of SARS-CoV-2, Influenza A, Influenza B, and respiratory syncytial virus (RSV) viral RNA in either nasopharyngeal swab or nasal swab specimens.         TROPONIN I - Normal    Troponin-I <0.010     LIPASE - Normal    Lipase Level 14     LACTIC ACID, PLASMA - Normal    Lactic Acid Level 1.1     BLOOD CULTURE [facility]   BLOOD CULTURE [facility]   CBC W/ AUTO DIFFERENTIAL    Narrative:     The following orders were created for panel order CBC Auto Differential.  Procedure                               Abnormality         Status                     ---------                               -----------         ------                     CBC with Differential[[id number]]       Abnormal            Final result                 Please view results for these tests on the individual orders.     EKG Readings: (Independently Interpreted)   Normal sinus rhythm with a first-degree AV block rate of 85, LAE D, Q-waves inferiorly, poor R-wave progression anteriorly.     ECG Results              EKG 12-lead (Final result)        Collection Time Result Time QRS Duration OHS QTC Calculation    12/26/24 11:32:11 12/26/24 15:38:36 98 471                     Final result by Interface, Lab In [facility] (12/26/24 15:38:44)                   Narrative:    Test Reason : R53.1,    Vent. Rate :  85 BPM     Atrial Rate :  85 BPM     P-R Int : 244 ms          QRS Dur :  98 ms      QT Int : 396 ms       P-R-T Axes :  69 -46  79 degrees    QTcB Int : 471 ms    Sinus rhythm with 1st degree A-V block  Possible Left atrial enlargement  Left axis deviation  Inferior  infarct ,age undetermined  Possible Anterior infarct (cited on or before 30-Jun-2024)  Abnormal ECG  When compared with ECG of 03-Jul-2024 13:38,  Sinus rhythm has replaced Junctional rhythm  Vent. rate has decreased by  44 bpm  T wave inversion now evident in Lateral leads  Confirmed by Cezar Castro ([id number]) on 12/26/2024 3:38:35 PM    Referred By: [email or web address] SELF           Confirmed By: Cezar Castro                                  Imaging Results              X-Ray Chest AP Portable (Final result)  Result time 12/26/24 12:03:05      Final result by Isidoro Yates MD (12/26/24 12:03:05)                   Impression:      1. Mild improved aeration of the lungs bilaterally with residual infiltrate atelectasis at the left mid and lower lung and right lung base  2. Mild cardiomegaly  3. Atherosclerosis      Electronically signed by: Isidoro Yates  Date:    12/26/2024  Time:    12:03               Narrative:    EXAMINATION:  XR CHEST AP PORTABLE    CLINICAL HISTORY:  , Dyspnea, unspecified.    COMPARISON:  07/01/2024    FINDINGS:  An AP view or more reveals the heart to be mildly enlarged.  The trachea is to the left midline.  Patient rotated on the exam.  There is mild improved aeration at the lungs bilaterally since the prior exam with residual patchy infiltrate or atelectasis at the left mid and lower lung and right lung base degenerative changes and curvature are noted to the thoracolumbar spine.                                       Medications   sodium chloride 0.9% flush 10 mL (has no administration in time range)   melatonin tablet 6 mg (has no administration in time range)   furosemide injection 20 mg (20 mg Intravenous Not Given 12/26/24 2141)   cefTRIAXone injection 1 g (has no administration in time range)   azithromycin (ZITHROMAX) 500 mg in 0.9% NaCl 250 mL IVPB (admixture device) (has no administration in time range)   atorvastatin tablet 10 mg (10 mg Oral Not Given 12/26/24 2115)   metoprolol  tartrate (LOPRESSOR) tablet 25 mg (25 mg Oral Not Given 12/26/24 2115)   lisinopriL tablet 5 mg (5 mg Oral Not Given 12/26/24 2115)   cefTRIAXone injection 1 g (1 g Intravenous Given 12/26/24 1229)   azithromycin (ZITHROMAX) 500 mg in 0.9% NaCl 250 mL IVPB (admixture device) (0 mg Intravenous Stopped 12/26/24 1324)   furosemide injection 20 mg (20 mg Intravenous Given 12/26/24 1210)     Medical Decision Making  Amount and/or Complexity of Data Reviewed  Labs: ordered.  Radiology: ordered.    Risk  OTC drugs.  Prescription drug management.  Decision regarding hospitalization.    69-year-old male complaining of 3 day history of increasing shortness of breath, bloody sputum, chest pain with coughing only.  Vital signs were stable in ED, afebrile, O2 sat 95% on room air.  Lungs with bilateral rhonchi and rales with no accessory muscle use.  Labs remarkable for a hemoglobin 12.0, hematocrit 36.6, BNP 2140.  Chest x-ray bilateral interstitial infiltrates and right pleural effusion.  EKG with no acute ischemic changes.  We will start Lasix 20 mg IV now, start Rocephin 1 g and Zithromax 500 mg IV and admit to primary care physician for further management of CHF exacerbation with possible overlying pneumonia.                                  Clinical Impression:  Final diagnoses:  [R06.00] Dyspnea  [R53.1] Weakness          ED Disposition Condition    Admit                 Rodrigo Moffett MD  12/27/24 [id number]

## 2024-12-27 VITALS
BODY MASS INDEX: 27.63 KG/M2 | WEIGHT: 204 LBS | DIASTOLIC BLOOD PRESSURE: 68 MMHG | OXYGEN SATURATION: 96 % | RESPIRATION RATE: 18 BRPM | SYSTOLIC BLOOD PRESSURE: 105 MMHG | TEMPERATURE: 98 F | HEIGHT: 72 IN | HEART RATE: 72 BPM

## 2024-12-27 PROBLEM — J18.9 COMMUNITY ACQUIRED PNEUMONIA: Status: RESOLVED | Noted: 2024-06-27 | Resolved: 2024-12-27

## 2024-12-27 PROBLEM — R06.02 SHORTNESS OF BREATH: Status: RESOLVED | Noted: 2024-06-27 | Resolved: 2024-12-27

## 2024-12-27 PROCEDURE — 25000003 PHARM REV CODE 250: Performed by: INTERNAL MEDICINE

## 2024-12-27 PROCEDURE — 63600175 PHARM REV CODE 636 W HCPCS: Performed by: INTERNAL MEDICINE

## 2024-12-27 PROCEDURE — 94761 N-INVAS EAR/PLS OXIMETRY MLT: CPT

## 2024-12-27 RX ORDER — POTASSIUM CHLORIDE 20 MEQ/1
20 TABLET, EXTENDED RELEASE ORAL DAILY
Status: DISCONTINUED | OUTPATIENT
Start: 2024-12-27 | End: 2024-12-27 | Stop reason: HOSPADM

## 2024-12-27 RX ORDER — SPIRONOLACTONE 25 MG/1
25 TABLET ORAL DAILY
Status: DISCONTINUED | OUTPATIENT
Start: 2024-12-27 | End: 2024-12-27 | Stop reason: HOSPADM

## 2024-12-27 RX ORDER — METOPROLOL SUCCINATE 50 MG/1
50 TABLET, EXTENDED RELEASE ORAL DAILY
Status: DISCONTINUED | OUTPATIENT
Start: 2024-12-27 | End: 2024-12-27 | Stop reason: HOSPADM

## 2024-12-27 RX ORDER — METOPROLOL SUCCINATE 50 MG/1
25 TABLET, EXTENDED RELEASE ORAL DAILY
Start: 2024-12-27 | End: 2025-01-01

## 2024-12-27 RX ORDER — GABAPENTIN 300 MG/1
300 CAPSULE ORAL 2 TIMES DAILY
Status: DISCONTINUED | OUTPATIENT
Start: 2024-12-27 | End: 2024-12-27 | Stop reason: HOSPADM

## 2024-12-27 RX ORDER — ATORVASTATIN CALCIUM 40 MG/1
40 TABLET, FILM COATED ORAL DAILY
Status: DISCONTINUED | OUTPATIENT
Start: 2024-12-27 | End: 2024-12-27

## 2024-12-27 RX ORDER — ATORVASTATIN CALCIUM 40 MG/1
40 TABLET, FILM COATED ORAL NIGHTLY
Status: DISCONTINUED | OUTPATIENT
Start: 2024-12-27 | End: 2024-12-27 | Stop reason: HOSPADM

## 2024-12-27 RX ORDER — LISINOPRIL 2.5 MG/1
2.5 TABLET ORAL DAILY
Status: DISCONTINUED | OUTPATIENT
Start: 2024-12-27 | End: 2024-12-27 | Stop reason: HOSPADM

## 2024-12-27 RX ADMIN — CEFTRIAXONE SODIUM 1 G: 1 INJECTION, POWDER, FOR SOLUTION INTRAMUSCULAR; INTRAVENOUS at 11:12

## 2024-12-27 RX ADMIN — FUROSEMIDE 20 MG: 10 INJECTION, SOLUTION INTRAMUSCULAR; INTRAVENOUS at 11:12

## 2024-12-27 RX ADMIN — POTASSIUM CHLORIDE 20 MEQ: 1500 TABLET, EXTENDED RELEASE ORAL at 04:12

## 2024-12-27 NOTE — ASSESSMENT & PLAN NOTE
Questionable diagnosis Patient has a diagnosis of pneumonia. The cause of the pneumonia is unknown at this time. The pneumonia is stable. The patient has the following signs/symptoms of pneumonia:  Dot dot. The patient does not have a current oxygen requirement and the patient does not have a home oxygen requirement. I have reviewed the pertinent imaging. The following cultures have been collected:  PCR are pending.  The culture results are listed below.     Current antimicrobial regimen consists of the antibiotics listed below. Will monitor patient closely and continue current treatment plan unchanged.  Will watch her for 24 hours that deescalate.  Patient has a rapid response to diuresis    Antibiotics (From admission, onward)      Start     Stop Route Frequency Ordered    12/27/24 1400  azithromycin (ZITHROMAX) 500 mg in 0.9% NaCl 250 mL IVPB (admixture device)         12/28/24 0159 IV ED 1 Time 12/26/24 1315    12/27/24 1300  cefTRIAXone injection 1 g         -- IV Every 24 hours (non-standard times) 12/26/24 1314            Microbiology Results (last 7 days)       Procedure Component Value Units Date/Time    Blood Culture #1 **CANNOT BE ORDERED STAT** [[id number]] Collected: 12/26/24 1223    Order Status: Resulted Specimen: Blood Updated: 12/26/24 1224    Blood Culture #2 **CANNOT BE ORDERED STAT** [[id number]] Collected: 12/26/24 1223    Order Status: Resulted Specimen: Blood Updated: 12/26/24 1224

## 2024-12-27 NOTE — ASSESSMENT & PLAN NOTE
Patient has Systolic (HFrEF) heart failure that is Acute on chronic. On presentation their CHF was decompensated. Evidence of decompensated CHF on presentation includes: edema, crackles on lung auscultation, weight gain, orthopnea, and shortness of breath. The etiology of their decompensation is likely dietary indiscretion and perhaps decreasing his spironolactone because of the weakness he complained about 2 weeks ago . Most recent BNP and echo results are listed below.  Recent Labs     12/26/24  1109   BNP 2,140.9*     Latest ECHO  Results for orders placed during the hospital encounter of 06/27/24    Echo    Interpretation Summary    Left Ventricle: The left ventricle is moderately dilated. Increased wall thickness. Severe global hypokinesis present. There is severely reduced systolic function with a visually estimated ejection fraction of 10 -15%.    Right Ventricle: Mild right ventricular enlargement.    Left Atrium: Left atrium is moderately dilated.    Right Atrium: Right atrium is mildly dilated.    Aortic Valve: The aortic valve is a trileaflet valve. Mildly calcified cusps.    Mitral Valve: There is mild regurgitation.    Pulmonic Valve: There is moderate regurgitation.    Current Heart Failure Medications  , , Oral  , , Oral  furosemide injection 20 mg, Every 12 hours, Intravenous    Plan  - Monitor strict I&Os and daily weights.    - Place on telemetry  - Low sodium diet  - Place on fluid restriction of 1.5 L.   - Cardiology has not been consulted  - The patient's volume status is improving but not at their baseline as indicated by edema, crackles on lung auscultation, weight gain, orthopnea, dyspnea on exertion (RIDER), and shortness of breath  - we will follow up in the morning.  Patient has improved after Lasix

## 2024-12-27 NOTE — SUBJECTIVE & OBJECTIVE
Past Medical History:   Diagnosis Date    CHF (congestive heart failure)     GERD (gastroesophageal reflux disease)     High cholesterol     HTN (hypertension)        Past Surgical History:   Procedure Laterality Date    COLONOSCOPY  12/28/2016    COLONOSCOPY N/A 07/28/2022    Procedure: COLONOSCOPY;  Surgeon: Vern Mitchell III, MD;  Location: [facility];  Service: Endoscopy;  Laterality: N/A;  Suboptimal prep. Diverticulosis without perforation.    FACIAL FRACTURE SURGERY      HERNIA REPAIR      HIP REPLACEMENT ARTHROPLASTY Left     KNEE ARTHROSCOPY Right     WRIST SURGERY Right        Review of patient's allergies indicates:  No Known Allergies    Current Facility-Administered Medications on File Prior to Encounter   Medication    LIDOcaine (PF) 10 mg/ml (1%) injection 10 mg     Current Outpatient Medications on File Prior to Encounter   Medication Sig    digoxin (LANOXIN) 125 mcg tablet Take 0.125 mg by mouth.    atorvastatin (LIPITOR) 20 MG tablet     clotrimazole (MYCELEX) 10 mg nory     diazePAM (VALIUM) 10 MG Tab     furosemide (LASIX) 40 MG tablet Take 40 mg by mouth.     Family History       Problem Relation (Age of Onset)    Breast cancer Mother    Colon cancer Brother    Diabetes Mother    Hyperlipidemia Mother    Hypertension Mother    Pneumonia Father          Tobacco Use    Smoking status: Former    Smokeless tobacco: Never   Substance and Sexual Activity    Alcohol use: Yes     Alcohol/week: 7.0 standard drinks of alcohol     Types: 7 Cans of beer per week    Drug use: Never    Sexual activity: Not on file     Review of Systems   Constitutional:  Positive for activity change and fatigue.   HENT: Negative.     Eyes: Negative.    Respiratory:  Positive for cough, chest tightness and shortness of breath.    Cardiovascular:  Positive for chest pain.   Gastrointestinal: Negative.    Endocrine: Negative.    Genitourinary: Negative.    Musculoskeletal: Negative.    Allergic/Immunologic: Negative.     Neurological: Negative.    Hematological: Negative.    Psychiatric/Behavioral: Negative.       Objective:     Vital Signs (Most Recent):  Temp: 97.9 °F (36.6 °C) (12/26/24 1640)  Pulse: 75 (12/26/24 1640)  Resp: (!) 22 (12/26/24 1020)  BP: (!) 92/57 (12/26/24 1640)  SpO2: 96 % (12/26/24 1640) Vital Signs (24h Range):  Temp:  [97.8 °F (36.6 °C)-98.4 °F (36.9 °C)] 97.9 °F (36.6 °C)  Pulse:  [72-92] 75  Resp:  [22] 22  SpO2:  [93 %-98 %] 96 %  BP: ()/(55-84) 92/57     Weight: 92.5 kg (204 lb)  Body mass index is 27.67 kg/m².     Physical Exam    On physical exam patient is alert and oriented x3.  He does not appear to be in any physical or respiratory distress.  Cranial nerves 2-12 are intact.  No chest pain.  He is no JVD.  Regular rate and rhythm no rubs gallops or murmurs clear to auscultation bilaterally except at the bases bilaterally that has some decreased breath sounds.  Abdomen is soft nontender nondistended no clubbing cyanosis or edema bilateral lower extremities.           Significant Labs: All pertinent labs within the past 24 hours have been reviewed.  Recent Lab Results  (Last 5 results in the past 24 hours)        12/26/24  1223   12/26/24  1202   12/26/24  1132   12/26/24  1109   12/26/24  1041        RSV Ag by Molecular Method         Not Detected       Influenza A, Molecular         Not Detected       Influenza B, Molecular         Not Detected       Human Rhinovirus/Enterovirus   Not Detected             Parainfluenza Virus 1   Not Detected             Parainfluenza Virus 2   Not Detected             Parainfluenza Virus 3   Not Detected             Parainfluenza Virus 4   Not Detected             Albumin/Globulin Ratio       1.1         ADENOVIRUS   Not Detected             Albumin       3.6         ALP       114         ALT       17         Anion Gap       9.0         AST       16         Baso #       0.04         Basophil %       0.4         BILIRUBIN TOTAL       2.4         BNP        2,140.9         BORDETELLA PARAPERTUSSIS ([id number])   Not Detected             Bordetella pertussis (ptxP)   Not Detected             BUN       16.0         BUN/CREAT RATIO       20         Calcium       9.3         CHLAMYDIA PNEUMONIAE   Not Detected             Chloride       105         CO2       22         Coronavirus 229E   Not Detected             Coronavirus HKU1   Not Detected             Coronavirus NL63   Not Detected             Coronavirus OC43 PCR, Common Cold Virus   Not Detected             Creatinine       0.81         eGFR       >60         Eos #       0.01         Eos %       0.1         Globulin, Total       3.3         Glucose       102         Hematocrit       36.6         Hemoglobin       12.0         Human Metapneumovirus   Not Detected             Immature Grans (Abs)       0.02         Immature Granulocytes       0.2         INR       1.2         Lactic Acid Level 1.1               Lipase       14         Lymph #       1.52         LYMPH %       17.0         MCH       31.2         MCHC       32.8         MCV       95.1         Mono #       0.91         Mono %       10.2         MPV       10.2         MYCOPLASMA PNEUMONIAE   Not Detected             Neut #       6.46         Neut %       72.1         QRS Duration     98           OHS QTC Calculation     471           Platelet Count       214         Potassium       4.0         PROTEIN TOTAL       6.9         PT       15.6         RBC       3.85         RDW       15.3         SARS-CoV2 (COVID-19) Qualitative PCR         Not Detected       Sodium       136         Troponin I       <0.010         WBC       8.96                                Significant Imaging: I have reviewed all pertinent imaging results/findings within the past 24 hours.

## 2024-12-27 NOTE — ASSESSMENT & PLAN NOTE
Patient found to have small pleural effusion on imaging. I have personally reviewed and interpreted the following imaging: Xray. A thoracentesis was deferred. Most likely etiology includes Congestive Heart Failure. Management to include Diuresis

## 2024-12-27 NOTE — ASSESSMENT & PLAN NOTE
Patient's blood pressure range in the last 24 hours was: BP  Min: 92/57  Max: 131/72.The patient's inpatient anti-hypertensive regimen is listed below:  Current Antihypertensives  , , Oral  furosemide injection 20 mg, Every 12 hours, Intravenous    Plan  - BP is controlled, no changes needed to their regimen  - we will follow up on its medications that his cardiologist put him on.  Especially the 1 from [facility] that is not on IMR

## 2024-12-27 NOTE — ASSESSMENT & PLAN NOTE
Presumably due to cardiac etiology.  Respiratory will continue antibiotics and breathing treatments for another 24 hours.  Deescalate antibiotics tomorrow should he continue show clinical stability.  Again no prodromal symptomatology from an infectious standpoint.  We made go back to his original dosing of spironolactone and Lasix daily.  I will go back to my office rest records tomorrow to check and see why we took him off the spironolactone.

## 2024-12-27 NOTE — PLAN OF CARE
Problem: Adult Inpatient Plan of Care  Goal: Plan of Care Review  Outcome: Progressing  Goal: Patient-Specific Goal (Individualized)  Outcome: Progressing  Goal: Absence of Hospital-Acquired Illness or Injury  Outcome: Progressing  Goal: Optimal Comfort and Wellbeing  Outcome: Progressing  Goal: Readiness for Transition of Care  Outcome: Progressing     Problem: Heart Failure  Goal: Optimal Coping  Outcome: Progressing  Goal: Optimal Cardiac Output  Outcome: Progressing  Goal: Stable Heart Rate and Rhythm  Outcome: Progressing  Goal: Optimal Functional Ability  Outcome: Progressing  Goal: Fluid and Electrolyte Balance  Outcome: Progressing  Goal: Improved Oral Intake  Outcome: Progressing  Goal: Effective Oxygenation and Ventilation  Outcome: Progressing  Goal: Effective Breathing Pattern During Sleep  Outcome: Progressing     Problem: Pain Acute  Goal: Optimal Pain Control and Function  Outcome: Progressing     Problem: Pneumonia  Goal: Fluid Balance  Outcome: Progressing  Goal: Resolution of Infection Signs and Symptoms  Outcome: Progressing  Goal: Effective Oxygenation and Ventilation  Outcome: Progressing

## 2024-12-27 NOTE — H&P
[facility]  History & Physical    Patient Name: Slick Kamara  MRN: [id number]  Patient Class: IP- Inpatient  Admission Date: 12/26/2024  Attending Physician: Vern Mitchell III, MD  Primary Care Provider: Vern Mitchell III, MD         Patient information was obtained from patient and ER records.     Subjective:     Principal Problem:Shortness of breath    Chief Complaint:   Chief Complaint   Patient presents with    Cough     C/o cough with blood, unable to sleep, feet swelling x 3 days. Hx CHF        HPI: Patient is a 69-year-old white male with a history of viral myocarditis, longstanding systolic congestive heart failure with most recent history of atrial fibrillation status post cardiac ablation back in October of this year.  Patient is cardiologist was not successful in the ablation.  Please see the record.  Patient is on digoxin in his on a special medication from [facility] to rhythm and rate control him.    Patient presents with shortness of breaths of last couple days.  Incidentally I saw him in my office about a week and a half to 2 weeks ago and at that point I recall him saying he was very weak tired.  Blood pressure is we are perhaps a little low at the time and I told him to go to every other day for his spironolactone and maintain the Lasix daily.  Since then he said his ankle swollen up in last 3 days he just could not take it he was more short of breath than usual.  He admits to a nonproductive cough.  He has no other preceding prodrome anal coryza symptoms.  Patient did admit to some left-sided precordial chest pain that lasted throughout today.  It he is not having anymore.  His shortness a breath is better after IV Lasix.  He did admit to eating little bit more salt during the holidays and he is refrain from alcohol for at least 4 months.    Workup in the ER was unrevealing from an infectious standpoint (PCR for COVID flu RSV and pan  PCR were negative).  Patient's cardiac enzymes and EKGs were normal but his BNP was elevated above 2000.  We are admitting him for possible rule in pneumonia versus underlying treatment of his congestive heart failure which may be exacerbating.      Past Medical History:   Diagnosis Date    CHF (congestive heart failure)     GERD (gastroesophageal reflux disease)     High cholesterol     HTN (hypertension)        Past Surgical History:   Procedure Laterality Date    COLONOSCOPY  12/28/2016    COLONOSCOPY N/A 07/28/2022    Procedure: COLONOSCOPY;  Surgeon: Vern Mitchell III, MD;  Location: [facility];  Service: Endoscopy;  Laterality: N/A;  Suboptimal prep. Diverticulosis without perforation.    FACIAL FRACTURE SURGERY      HERNIA REPAIR      HIP REPLACEMENT ARTHROPLASTY Left     KNEE ARTHROSCOPY Right     WRIST SURGERY Right        Review of patient's allergies indicates:  No Known Allergies    Current Facility-Administered Medications on File Prior to Encounter   Medication    LIDOcaine (PF) 10 mg/ml (1%) injection 10 mg     Current Outpatient Medications on File Prior to Encounter   Medication Sig    digoxin (LANOXIN) 125 mcg tablet Take 0.125 mg by mouth.    atorvastatin (LIPITOR) 20 MG tablet     clotrimazole (MYCELEX) 10 mg nory     diazePAM (VALIUM) 10 MG Tab     furosemide (LASIX) 40 MG tablet Take 40 mg by mouth.     Family History       Problem Relation (Age of Onset)    Breast cancer Mother    Colon cancer Brother    Diabetes Mother    Hyperlipidemia Mother    Hypertension Mother    Pneumonia Father          Tobacco Use    Smoking status: Former    Smokeless tobacco: Never   Substance and Sexual Activity    Alcohol use: Yes     Alcohol/week: 7.0 standard drinks of alcohol     Types: 7 Cans of beer per week    Drug use: Never    Sexual activity: Not on file     Review of Systems   Constitutional:  Positive for activity change and fatigue.   HENT: Negative.     Eyes: Negative.    Respiratory:  Positive  for cough, chest tightness and shortness of breath.    Cardiovascular:  Positive for chest pain.   Gastrointestinal: Negative.    Endocrine: Negative.    Genitourinary: Negative.    Musculoskeletal: Negative.    Allergic/Immunologic: Negative.    Neurological: Negative.    Hematological: Negative.    Psychiatric/Behavioral: Negative.       Objective:     Vital Signs (Most Recent):  Temp: 97.9 °F (36.6 °C) (12/26/24 1640)  Pulse: 75 (12/26/24 1640)  Resp: (!) 22 (12/26/24 1020)  BP: (!) 92/57 (12/26/24 1640)  SpO2: 96 % (12/26/24 1640) Vital Signs (24h Range):  Temp:  [97.8 °F (36.6 °C)-98.4 °F (36.9 °C)] 97.9 °F (36.6 °C)  Pulse:  [72-92] 75  Resp:  [22] 22  SpO2:  [93 %-98 %] 96 %  BP: ()/(55-84) 92/57     Weight: 92.5 kg (204 lb)  Body mass index is 27.67 kg/m².     Physical Exam    On physical exam patient is alert and oriented x3.  He does not appear to be in any physical or respiratory distress.  Cranial nerves 2-12 are intact.  No chest pain.  He is no JVD.  Regular rate and rhythm no rubs gallops or murmurs clear to auscultation bilaterally except at the bases bilaterally that has some decreased breath sounds.  Abdomen is soft nontender nondistended no clubbing cyanosis or edema bilateral lower extremities.           Significant Labs: All pertinent labs within the past 24 hours have been reviewed.  Recent Lab Results  (Last 5 results in the past 24 hours)        12/26/24  1223   12/26/24  1202   12/26/24  1132   12/26/24  1109   12/26/24  1041        RSV Ag by Molecular Method         Not Detected       Influenza A, Molecular         Not Detected       Influenza B, Molecular         Not Detected       Human Rhinovirus/Enterovirus   Not Detected             Parainfluenza Virus 1   Not Detected             Parainfluenza Virus 2   Not Detected             Parainfluenza Virus 3   Not Detected             Parainfluenza Virus 4   Not Detected             Albumin/Globulin Ratio       1.1         ADENOVIRUS    Not Detected             Albumin       3.6         ALP       114         ALT       17         Anion Gap       9.0         AST       16         Baso #       0.04         Basophil %       0.4         BILIRUBIN TOTAL       2.4         BNP       2,140.9         BORDETELLA PARAPERTUSSIS ([id number])   Not Detected             Bordetella pertussis (ptxP)   Not Detected             BUN       16.0         BUN/CREAT RATIO       20         Calcium       9.3         CHLAMYDIA PNEUMONIAE   Not Detected             Chloride       105         CO2       22         Coronavirus 229E   Not Detected             Coronavirus HKU1   Not Detected             Coronavirus NL63   Not Detected             Coronavirus OC43 PCR, Common Cold Virus   Not Detected             Creatinine       0.81         eGFR       >60         Eos #       0.01         Eos %       0.1         Globulin, Total       3.3         Glucose       102         Hematocrit       36.6         Hemoglobin       12.0         Human Metapneumovirus   Not Detected             Immature Grans (Abs)       0.02         Immature Granulocytes       0.2         INR       1.2         Lactic Acid Level 1.1               Lipase       14         Lymph #       1.52         LYMPH %       17.0         MCH       31.2         MCHC       32.8         MCV       95.1         Mono #       0.91         Mono %       10.2         MPV       10.2         MYCOPLASMA PNEUMONIAE   Not Detected             Neut #       6.46         Neut %       72.1         QRS Duration     98           [facility] QTC Calculation     471           Platelet Count       214         Potassium       4.0         PROTEIN TOTAL       6.9         PT       15.6         RBC       3.85         RDW       15.3         SARS-CoV2 (COVID-19) Qualitative PCR         Not Detected       Sodium       136         Troponin I       <0.010         WBC       8.96                                Significant Imaging: I have reviewed all pertinent imaging  results/findings within the past 24 hours.  Assessment/Plan:     * Shortness of breath  Presumably due to cardiac etiology.  Respiratory will continue antibiotics and breathing treatments for another 24 hours.  Deescalate antibiotics tomorrow should he continue show clinical stability.  Again no prodromal symptomatology from an infectious standpoint.  We made go back to his original dosing of spironolactone and Lasix daily.  I will go back to my office rest records tomorrow to check and see why we took him off the spironolactone.      HTN (hypertension)  Patient's blood pressure range in the last 24 hours was: BP  Min: 92/57  Max: 131/72.The patient's inpatient anti-hypertensive regimen is listed below:  Current Antihypertensives  , , Oral  furosemide injection 20 mg, Every 12 hours, Intravenous    Plan  - BP is controlled, no changes needed to their regimen  - we will follow up on its medications that his cardiologist put him on.  Especially the 1 from [facility] that is not on IMR    Pleural effusion  Patient found to have small pleural effusion on imaging. I have personally reviewed and interpreted the following imaging: Xray. A thoracentesis was deferred. Most likely etiology includes Congestive Heart Failure. Management to include Diuresis      Acute on chronic systolic congestive heart failure, NYHA class 3  Patient has Systolic (HFrEF) heart failure that is Acute on chronic. On presentation their CHF was decompensated. Evidence of decompensated CHF on presentation includes: edema, crackles on lung auscultation, weight gain, orthopnea, and shortness of breath. The etiology of their decompensation is likely dietary indiscretion and perhaps decreasing his spironolactone because of the weakness he complained about 2 weeks ago . Most recent BNP and echo results are listed below.  Recent Labs     12/26/24  1109   BNP 2,140.9*     Latest ECHO  Results for orders placed during the hospital encounter of  06/27/24    Echo    Interpretation Summary    Left Ventricle: The left ventricle is moderately dilated. Increased wall thickness. Severe global hypokinesis present. There is severely reduced systolic function with a visually estimated ejection fraction of 10 -15%.    Right Ventricle: Mild right ventricular enlargement.    Left Atrium: Left atrium is moderately dilated.    Right Atrium: Right atrium is mildly dilated.    Aortic Valve: The aortic valve is a trileaflet valve. Mildly calcified cusps.    Mitral Valve: There is mild regurgitation.    Pulmonic Valve: There is moderate regurgitation.    Current Heart Failure Medications  , , Oral  , , Oral  furosemide injection 20 mg, Every 12 hours, Intravenous    Plan  - Monitor strict I&Os and daily weights.    - Place on telemetry  - Low sodium diet  - Place on fluid restriction of 1.5 L.   - Cardiology has not been consulted  - The patient's volume status is improving but not at their baseline as indicated by edema, crackles on lung auscultation, weight gain, orthopnea, dyspnea on exertion (RIDER), and shortness of breath  - we will follow up in the morning.  Patient has improved after Lasix        Community acquired pneumonia  Questionable diagnosis Patient has a diagnosis of pneumonia. The cause of the pneumonia is unknown at this time. The pneumonia is stable. The patient has the following signs/symptoms of pneumonia:  Dot dot. The patient does not have a current oxygen requirement and the patient does not have a home oxygen requirement. I have reviewed the pertinent imaging. The following cultures have been collected:  PCR are pending.  The culture results are listed below.     Current antimicrobial regimen consists of the antibiotics listed below. Will monitor patient closely and continue current treatment plan unchanged.  Will watch her for 24 hours that deescalate.  Patient has a rapid response to diuresis    Antibiotics (From admission, onward)      Start      Stop Route Frequency Ordered    12/27/24 1400  azithromycin (ZITHROMAX) 500 mg in 0.9% NaCl 250 mL IVPB (admixture device)         12/28/24 0159 IV ED 1 Time 12/26/24 1315    12/27/24 1300  cefTRIAXone injection 1 g         -- IV Every 24 hours (non-standard times) 12/26/24 1314            Microbiology Results (last 7 days)       Procedure Component Value Units Date/Time    Blood Culture #1 **CANNOT BE ORDERED STAT** [[id number]] Collected: 12/26/24 1223    Order Status: Resulted Specimen: Blood Updated: 12/26/24 1224    Blood Culture #2 **CANNOT BE ORDERED STAT** [[id number]] Collected: 12/26/24 1223    Order Status: Resulted Specimen: Blood Updated: 12/26/24 1224              VTE Risk Mitigation (From admission, onward)           Ordered     IP VTE HIGH RISK PATIENT  Once         12/26/24 1227     Place sequential compression device  Until discontinued         12/26/24 1227                                    Vern Mitchell III, MD  Department of Hospital Medicine  [facility]

## 2024-12-27 NOTE — HPI
Patient is a 69-year-old white male with a history of viral myocarditis, longstanding systolic congestive heart failure with most recent history of atrial fibrillation status post cardiac ablation back in October of this year.  Patient is cardiologist was not successful in the ablation.  Please see the record.  Patient is on digoxin in his on a special medication from [facility] to rhythm and rate control him.    Patient presents with shortness of breaths of last couple days.  Incidentally I saw him in my office about a week and a half to 2 weeks ago and at that point I recall him saying he was very weak tired.  Blood pressure is we are perhaps a little low at the time and I told him to go to every other day for his spironolactone and maintain the Lasix daily.  Since then he said his ankle swollen up in last 3 days he just could not take it he was more short of breath than usual.  He admits to a nonproductive cough.  He has no other preceding prodrome anal coryza symptoms.  Patient did admit to some left-sided precordial chest pain that lasted throughout today.  It he is not having anymore.  His shortness a breath is better after IV Lasix.  He did admit to eating little bit more salt during the holidays and he is refrain from alcohol for at least 4 months.    Workup in the ER was unrevealing from an infectious standpoint (PCR for COVID flu RSV and pan PCR were negative).  Patient's cardiac enzymes and EKGs were normal but his BNP was elevated above 2000.  We are admitting him for possible rule in pneumonia versus underlying treatment of his congestive heart failure which may be exacerbating.

## 2024-12-27 NOTE — PLAN OF CARE
12/27/24 1318   Discharge Assessment   Assessment Type Discharge Planning Assessment   Source of Information patient;family   People in Home alone   Do you have help at home or someone to help you manage your care at home? Yes   Who are your caregiver(s) and their phone number(s)? fly   Prior to hospitilization cognitive status: Alert/Oriented;No Deficits   Current cognitive status: Alert/Oriented;No Deficits   Walking or Climbing Stairs Difficulty no   Dressing/Bathing Difficulty no   Equipment Currently Used at Home none   Do you currently have service(s) that help you manage your care at home? No   Do you take prescription medications? Yes   Do you have prescription coverage? Yes   Do you have any problems affording any of your prescribed medications? No   Is the patient taking medications as prescribed? yes   Who is going to help you get home at discharge? fly   How do you get to doctors appointments? family or friend will provide   Are you on dialysis? No   Do you take coumadin? No   Discharge Plan A Home;Home with family   Discharge Plan B Home Health   DME Needed Upon Discharge  none   Discharge Plan discussed with: Patient   Transition of Care Barriers None   Physical Activity   On average, how many days per week do you engage in moderate to strenuous exercise (like a brisk walk)? 0 days   On average, how many minutes do you engage in exercise at this level? 0 min   Financial Resource Strain   How hard is it for you to pay for the very basics like food, housing, medical care, and heating? Not very   Housing Stability   In the last 12 months, was there a time when you were not able to pay the mortgage or rent on time? N   At any time in the past 12 months, were you homeless or living in a shelter (including now)? N   Transportation Needs   Has the lack of transportation kept you from medical appointments, meetings, work or from getting things needed for daily living? No   Food Insecurity   Within the past  12 months, you worried that your food would run out before you got the money to buy more. Never true   Within the past 12 months, the food you bought just didn't last and you didn't have money to get more. Never true   Stress   Do you feel stress - tense, restless, nervous, or anxious, or unable to sleep at night because your mind is troubled all the time - these days? Only a littl   Social Isolation   How often do you feel lonely or isolated from those around you?  Rarely   Alcohol Use   Q1: How often do you have a drink containing alcohol? Never   Q2: How many drinks containing alcohol do you have on a typical day when you are drinking? None   Q3: How often do you have six or more drinks on one occasion? Never   Utilities   In the past 12 months has the electric, gas, oil, or water company threatened to shut off services in your home? No   Health Literacy   How often do you need to have someone help you when you read instructions, pamphlets, or other written material from your doctor or pharmacy? Sometimes

## 2024-12-28 NOTE — HOSPITAL COURSE
PATIENT'S HOSPITAL COURSE WAS UNCOMPLICATED.  HE RECEIVED 24 HOURS OF ANTIBIOTICS BECAUSE OF SUSPECTED PNEUMONIA HOWEVER PATIENT'S INFECTIOUS WORKUP (A COVID FLU RSV/RESPIRATORY PCR) WERE NEGATIVE.  PATIENT WAS GIVEN IV LASIX IN THE ER AND THEN ANOTHER DOSE LAST NIGHT HOWEVER HIS BLOOD PRESSURES WERE SLIGHTLY ON THE LOW SIDE PROXIMALLY 100S OVER 60S IN HIS BLOOD PRESSURE MEDICINES.  CARDIAC WORKUP WAS NEGATIVE IN TERMS OF NEGATIVE CARDIAC ENZYMES.  I THINK THIS PATIENT'S ADMISSION IS ESSENTIALLY DUE TO VOLUME OVERLOAD.      TO ADDRESS THIS ABOUT 2 WEEKS AGO I TOOK HIM OFF OF HIS DAILY SPIRONOLACTONE DOSE AND PUT HIM ON EVERY OTHER DAY.  THIS TIME I THINK WILL DEVELOP A NEW STRATEGY SINCE I THINK HE NEEDS MORE VOLUME REDUCTION.  I HAVE TOLD HIM TO CONTINUE HIS LASIX 40 MG DAILY IN ADDITION TO THE 25 OF SPIRONOLACTONE AND HOLD HIS METOPROLOL SHOULD HIS BLOOD PRESSURES GET LOW FOR SYSTOLIC LESS THAN 100 OR DIASTOLIC 60.  HE WILL CONTINUE ALSO METOPROLOL AT HALF THE DOSE FOR THE NEXT 5 DAYS AND THEN GO BACK UP TO 50 MG DAILY THEREAFTER AS I THINK HE CONTINUES TO RECOVER.      HE KNOWS TO CALL MY OFFICE IF HE HAS ANY WORSENING FEVERS CHILLS NAUSEA VOMITING SHORTNESS OF BREATHS OR ANY WEAKNESS AND I WILL FOLLOW HIM BACK UP IN MY OFFICE WITHIN 1-2 WEEKS.

## 2024-12-28 NOTE — DISCHARGE SUMMARY
[facility] - Medical Surgical Unit  Hospital Medicine  Discharge Summary      Patient Name: Slick Kamara  MRN: [id number]  MARCELO: [id number]  Patient Class: IP- Inpatient  Admission Date: 12/26/2024  Hospital Length of Stay: 1 days  Discharge Date and Time: No discharge date for patient encounter.  Attending Physician: Vern Mitchell III, *   Discharging Provider: Vern Mitchell III, MD  Primary Care Provider: Vern Mitchell III, MD    Primary Care Team: Networked reference to record PCT     HPI:   Patient is a 69-year-old white male with a history of viral myocarditis, longstanding systolic congestive heart failure with most recent history of atrial fibrillation status post cardiac ablation back in October of this year.  Patient is cardiologist was not successful in the ablation.  Please see the record.  Patient is on digoxin in his on a special medication from [facility] to rhythm and rate control him.    Patient presents with shortness of breaths of last couple days.  Incidentally I saw him in my office about a week and a half to 2 weeks ago and at that point I recall him saying he was very weak tired.  Blood pressure is we are perhaps a little low at the time and I told him to go to every other day for his spironolactone and maintain the Lasix daily.  Since then he said his ankle swollen up in last 3 days he just could not take it he was more short of breath than usual.  He admits to a nonproductive cough.  He has no other preceding prodrome anal coryza symptoms.  Patient did admit to some left-sided precordial chest pain that lasted throughout today.  It he is not having anymore.  His shortness a breath is better after IV Lasix.  He did admit to eating little bit more salt during the holidays and he is refrain from alcohol for at least 4 months.    Workup in the ER was unrevealing from an infectious standpoint (PCR for COVID flu RSV and pan PCR were negative).  Patient's cardiac enzymes and  EKGs were normal but his BNP was elevated above 2000.  We are admitting him for possible rule in pneumonia versus underlying treatment of his congestive heart failure which may be exacerbating.      * No surgery found *      Hospital Course:   PATIENT'S HOSPITAL COURSE WAS UNCOMPLICATED.  HE RECEIVED 24 HOURS OF ANTIBIOTICS BECAUSE OF SUSPECTED PNEUMONIA HOWEVER PATIENT'S INFECTIOUS WORKUP (A COVID FLU RSV/RESPIRATORY PCR) WERE NEGATIVE.  PATIENT WAS GIVEN IV LASIX IN THE ER AND THEN ANOTHER DOSE LAST NIGHT HOWEVER HIS BLOOD PRESSURES WERE SLIGHTLY ON THE LOW SIDE PROXIMALLY 100S OVER 60S IN HIS BLOOD PRESSURE MEDICINES.  CARDIAC WORKUP WAS NEGATIVE IN TERMS OF NEGATIVE CARDIAC ENZYMES.  I THINK THIS PATIENT'S ADMISSION IS ESSENTIALLY DUE TO VOLUME OVERLOAD.      TO ADDRESS THIS ABOUT 2 WEEKS AGO I TOOK HIM OFF OF HIS DAILY SPIRONOLACTONE DOSE AND PUT HIM ON EVERY OTHER DAY.  THIS TIME I THINK WILL DEVELOP A NEW STRATEGY SINCE I THINK HE NEEDS MORE VOLUME REDUCTION.  I HAVE TOLD HIM TO CONTINUE HIS LASIX 40 MG DAILY IN ADDITION TO THE 25 OF SPIRONOLACTONE AND HOLD HIS METOPROLOL SHOULD HIS BLOOD PRESSURES GET LOW FOR SYSTOLIC LESS THAN 100 OR DIASTOLIC 60.  HE WILL CONTINUE ALSO METOPROLOL AT HALF THE DOSE FOR THE NEXT 5 DAYS AND THEN GO BACK UP TO 50 MG DAILY THEREAFTER AS I THINK HE CONTINUES TO RECOVER.      HE KNOWS TO CALL MY OFFICE IF HE HAS ANY WORSENING FEVERS CHILLS NAUSEA VOMITING SHORTNESS OF BREATHS OR ANY WEAKNESS AND I WILL FOLLOW HIM BACK UP IN MY OFFICE WITHIN 1-2 WEEKS.         Goals of Care Treatment Preferences:  Code Status: Full Code      SDOH Screening:  The patient was screened for utility difficulties, food insecurity, transport difficulties, housing insecurity, and interpersonal safety and there were no concerns identified this admission.     Consults:     HTN (hypertension)  Patient's blood pressure range in the last 24 hours was: BP  Min: 92/57  Max: 131/72.The patient's inpatient  anti-hypertensive regimen is listed below:  Current Antihypertensives  , , Oral  furosemide injection 20 mg, Every 12 hours, Intravenous    Plan  - BP is controlled, no changes needed to their regimen  - we will follow up on its medications that his cardiologist put him on.  Especially the 1 from [facility] that is not on IMR    Pleural effusion  Patient found to have small pleural effusion on imaging. I have personally reviewed and interpreted the following imaging: Xray. A thoracentesis was deferred. Most likely etiology includes Congestive Heart Failure. Management to include Diuresis      Acute on chronic systolic congestive heart failure, NYHA class 3  Patient has Systolic (HFrEF) heart failure that is Acute on chronic. On presentation their CHF was decompensated. Evidence of decompensated CHF on presentation includes: edema, crackles on lung auscultation, weight gain, orthopnea, and shortness of breath. The etiology of their decompensation is likely dietary indiscretion and perhaps decreasing his spironolactone because of the weakness he complained about 2 weeks ago . Most recent BNP and echo results are listed below.  Recent Labs     12/26/24  1109   BNP 2,140.9*     Latest ECHO  Results for orders placed during the hospital encounter of 06/27/24    Echo    Interpretation Summary    Left Ventricle: The left ventricle is moderately dilated. Increased wall thickness. Severe global hypokinesis present. There is severely reduced systolic function with a visually estimated ejection fraction of 10 -15%.    Right Ventricle: Mild right ventricular enlargement.    Left Atrium: Left atrium is moderately dilated.    Right Atrium: Right atrium is mildly dilated.    Aortic Valve: The aortic valve is a trileaflet valve. Mildly calcified cusps.    Mitral Valve: There is mild regurgitation.    Pulmonic Valve: There is moderate regurgitation.    Current Heart Failure Medications  , , Oral  , , Oral  furosemide injection 20 mg,  Every 12 hours, Intravenous    Plan  - Monitor strict I&Os and daily weights.    - Place on telemetry  - Low sodium diet  - Place on fluid restriction of 1.5 L.   - Cardiology has not been consulted  - The patient's volume status is improving but not at their baseline as indicated by edema, crackles on lung auscultation, weight gain, orthopnea, dyspnea on exertion (RIDER), and shortness of breath  - we will follow up in the morning.  Patient has improved after Lasix          Final Active Diagnoses:    Diagnosis Date Noted POA    Pleural effusion [J90] 12/26/2024 Yes    HTN (hypertension) [I10]  Yes    Acute on chronic systolic congestive heart failure, NYHA class 3 [I50.23] 06/27/2024 Yes      Problems Resolved During this Admission:    Diagnosis Date Noted Date Resolved POA    PRINCIPAL PROBLEM:  Shortness of breath [R06.02] 06/27/2024 12/27/2024 Yes    Community acquired pneumonia [J18.9] 06/27/2024 12/27/2024 Yes       Discharged Condition: good    Disposition: Home or Self Care    Follow Up:   Follow-up Information       Vern Mitchell III, MD Follow up in 2 week(s).    Specialty: Internal Medicine  Contact information:  [address]  [address]  [place] [address] [phone]                           Patient Instructions:   No discharge procedures on file.    Significant Diagnostic Studies: Labs: BMP:   Recent Labs   Lab 12/26/24  1109      K 4.0      CO2 22*   BUN 16.0   CREATININE 0.81   CALCIUM 9.3   , CMP   Recent Labs   Lab 12/26/24  1109      K 4.0      CO2 22*   BUN 16.0   CREATININE 0.81   CALCIUM 9.3   ALBUMIN 3.6   BILITOT 2.4*   ALKPHOS 114   AST 16   ALT 17   , CBC   Recent Labs   Lab 12/26/24  1109   WBC 8.96   HGB 12.0*   HCT 36.6*      , and All labs within the past 24 hours have been reviewed  Radiology: X-Ray: CXR: X-Ray Chest 1 View (CXR): No results found for this visit on 12/26/24.    Pending Diagnostic Studies:       None           Medications:  Reconciled  Home Medications:      Medication List        CHANGE how you take these medications      metoprolol succinate 50 MG 24 hr tablet  Commonly known as: TOPROL-XL  Take 0.5 tablets (25 mg total) by mouth once daily. AFTER 5 DAYS AT HALF THE DOSE, RESUME THE FULL 50 MG DAILY DOSING.    HOLD BLOOD PRESSURE MEDICINE IF SYSTOLIC IS LESS THAN 100 OR DIASTOLIC 60. for 5 days  What changed:   how much to take  additional instructions            CONTINUE taking these medications      atorvastatin 40 MG tablet  Commonly known as: LIPITOR  Take 40 mg by mouth once daily.     furosemide 40 MG tablet  Commonly known as: LASIX  Take 40 mg by mouth.     lisinopriL 2.5 MG tablet  Commonly known as: PRINIVIL,ZESTRIL  Take 2.5 mg by mouth once daily.     potassium chloride 10 MEQ Cpsr  Commonly known as: MICRO-K  Take 10 mEq by mouth once.     spironolactone 25 MG tablet  Commonly known as: ALDACTONE  Take 25 mg by mouth once daily.              Indwelling Lines/Drains at time of discharge:   Lines/Drains/Airways       None                   Time spent on the discharge of patient: 45 minutes         Vern Mitchell III, MD  Department of Hospital Medicine  [facility] - [facility] Surgical Unit

## 2024-12-31 ENCOUNTER — PATIENT OUTREACH (OUTPATIENT)
Dept: ADMINISTRATIVE | Facility: CLINIC | Age: 69
End: 2024-12-31
Payer: MEDICARE

## 2024-12-31 LAB
BACTERIA BLD CULT: NORMAL
BACTERIA BLD CULT: NORMAL

## 2024-12-31 NOTE — PROGRESS NOTES
C3 nurse spoke with Slick Kamara  for a TCC post hospital discharge follow up call. The patient has a scheduled HOSFU appointment with Vern Mitchell III, MD  on 1/6/25 @ 7955. Patient stated he takes Gabapentin 300mg BID and Ivabradine 5mg BID. Medications not on medication list and could not be reconciled.

## (undated) DEVICE — KIT SURGICAL COLON .25 1.1OZ